# Patient Record
Sex: FEMALE | Race: WHITE | NOT HISPANIC OR LATINO
[De-identification: names, ages, dates, MRNs, and addresses within clinical notes are randomized per-mention and may not be internally consistent; named-entity substitution may affect disease eponyms.]

---

## 2017-01-06 ENCOUNTER — RESULT REVIEW (OUTPATIENT)
Age: 52
End: 2017-01-06

## 2017-01-06 ENCOUNTER — APPOINTMENT (OUTPATIENT)
Dept: INTERNAL MEDICINE | Facility: CLINIC | Age: 52
End: 2017-01-06

## 2017-01-06 VITALS
DIASTOLIC BLOOD PRESSURE: 80 MMHG | HEART RATE: 86 BPM | HEIGHT: 63 IN | WEIGHT: 143 LBS | BODY MASS INDEX: 25.34 KG/M2 | SYSTOLIC BLOOD PRESSURE: 120 MMHG

## 2017-01-18 LAB
T4 FREE SERPL-MCNC: 0.9 NG/DL
TSH SERPL DL<=0.005 MIU/L-ACNC: 1.81 UIU/ML

## 2017-01-24 ENCOUNTER — APPOINTMENT (OUTPATIENT)
Dept: INTERNAL MEDICINE | Facility: CLINIC | Age: 52
End: 2017-01-24

## 2017-02-07 ENCOUNTER — APPOINTMENT (OUTPATIENT)
Dept: NEUROLOGY | Facility: CLINIC | Age: 52
End: 2017-02-07

## 2017-02-07 ENCOUNTER — OUTPATIENT (OUTPATIENT)
Dept: OUTPATIENT SERVICES | Facility: HOSPITAL | Age: 52
LOS: 1 days | Discharge: HOME | End: 2017-02-07

## 2017-02-07 VITALS — SYSTOLIC BLOOD PRESSURE: 103 MMHG | DIASTOLIC BLOOD PRESSURE: 74 MMHG | HEART RATE: 88 BPM

## 2017-02-17 ENCOUNTER — RX RENEWAL (OUTPATIENT)
Age: 52
End: 2017-02-17

## 2017-03-15 ENCOUNTER — APPOINTMENT (OUTPATIENT)
Dept: HEMATOLOGY ONCOLOGY | Facility: CLINIC | Age: 52
End: 2017-03-15

## 2017-03-23 ENCOUNTER — APPOINTMENT (OUTPATIENT)
Dept: HEMATOLOGY ONCOLOGY | Facility: CLINIC | Age: 52
End: 2017-03-23

## 2017-03-24 LAB
BASOPHILS # BLD: 0.01 TH/MM3
BASOPHILS NFR BLD: 0.2 %
EOSINOPHIL # BLD: 0.05 TH/MM3
EOSINOPHIL NFR BLD: 1 %
ERYTHROCYTE [DISTWIDTH] IN BLOOD BY AUTOMATED COUNT: 16 %
FERRITIN SERPL-MCNC: 114 NG/ML
GRANULOCYTES # BLD: 1.63 TH/MM3
GRANULOCYTES NFR BLD: 31.8 %
HCT VFR BLD AUTO: 36.6 %
HGB BLD-MCNC: 12.1 G/DL
IMM GRANULOCYTES # BLD: 0.01 TH/MM3
IMM GRANULOCYTES NFR BLD: 0.2 %
IRON SERPL-MCNC: 119 UG/DL
LYMPHOCYTES # BLD: 2.77 TH/MM3
LYMPHOCYTES NFR BLD: 54.1 %
MCH RBC QN AUTO: 31.2 PG
MCHC RBC AUTO-ENTMCNC: 33.1 G/DL
MCV RBC AUTO: 94.3 FL
MONOCYTES # BLD: 0.65 TH/MM3
MONOCYTES NFR BLD: 12.7 %
PLATELET # BLD: 268 TH/MM3
PMV BLD AUTO: 9.5 FL
RBC # BLD AUTO: 3.88 MIL/MM3
WBC # BLD: 5.12 TH/MM3

## 2017-05-09 ENCOUNTER — APPOINTMENT (OUTPATIENT)
Dept: NEUROLOGY | Facility: CLINIC | Age: 52
End: 2017-05-09

## 2017-05-09 VITALS — SYSTOLIC BLOOD PRESSURE: 112 MMHG | HEART RATE: 88 BPM | DIASTOLIC BLOOD PRESSURE: 75 MMHG

## 2017-05-19 ENCOUNTER — OTHER (OUTPATIENT)
Age: 52
End: 2017-05-19

## 2017-05-19 ENCOUNTER — RX RENEWAL (OUTPATIENT)
Age: 52
End: 2017-05-19

## 2017-06-15 ENCOUNTER — APPOINTMENT (OUTPATIENT)
Dept: OBGYN | Facility: CLINIC | Age: 52
End: 2017-06-15

## 2017-06-15 ENCOUNTER — OUTPATIENT (OUTPATIENT)
Dept: OUTPATIENT SERVICES | Facility: HOSPITAL | Age: 52
LOS: 1 days | Discharge: HOME | End: 2017-06-15

## 2017-06-15 VITALS
WEIGHT: 143 LBS | BODY MASS INDEX: 25.34 KG/M2 | SYSTOLIC BLOOD PRESSURE: 110 MMHG | DIASTOLIC BLOOD PRESSURE: 70 MMHG | HEIGHT: 63 IN

## 2017-06-15 DIAGNOSIS — G40.409 OTHER GENERALIZED EPILEPSY AND EPILEPTIC SYNDROMES, NOT INTRACTABLE, WITHOUT STATUS EPILEPTICUS: ICD-10-CM

## 2017-06-15 DIAGNOSIS — G40.909 EPILEPSY, UNSPECIFIED, NOT INTRACTABLE, WITHOUT STATUS EPILEPTICUS: ICD-10-CM

## 2017-06-23 ENCOUNTER — APPOINTMENT (OUTPATIENT)
Dept: HEMATOLOGY ONCOLOGY | Facility: CLINIC | Age: 52
End: 2017-06-23

## 2017-06-23 ENCOUNTER — OUTPATIENT (OUTPATIENT)
Dept: OUTPATIENT SERVICES | Facility: HOSPITAL | Age: 52
LOS: 1 days | Discharge: HOME | End: 2017-06-23

## 2017-06-23 VITALS
HEART RATE: 89 BPM | WEIGHT: 143 LBS | DIASTOLIC BLOOD PRESSURE: 55 MMHG | TEMPERATURE: 96.9 F | HEIGHT: 63 IN | RESPIRATION RATE: 14 BRPM | BODY MASS INDEX: 25.34 KG/M2 | SYSTOLIC BLOOD PRESSURE: 102 MMHG

## 2017-06-23 DIAGNOSIS — G40.909 EPILEPSY, UNSPECIFIED, NOT INTRACTABLE, WITHOUT STATUS EPILEPTICUS: ICD-10-CM

## 2017-06-23 DIAGNOSIS — N93.9 ABNORMAL UTERINE AND VAGINAL BLEEDING, UNSPECIFIED: ICD-10-CM

## 2017-06-23 DIAGNOSIS — G40.409 OTHER GENERALIZED EPILEPSY AND EPILEPTIC SYNDROMES, NOT INTRACTABLE, WITHOUT STATUS EPILEPTICUS: ICD-10-CM

## 2017-06-28 ENCOUNTER — RESULT REVIEW (OUTPATIENT)
Age: 52
End: 2017-06-28

## 2017-06-28 DIAGNOSIS — D50.9 IRON DEFICIENCY ANEMIA, UNSPECIFIED: ICD-10-CM

## 2017-06-28 LAB
BASOPHILS # BLD: 0.02 TH/MM3
BASOPHILS NFR BLD: 0.3 %
EOSINOPHIL # BLD: 0.05 TH/MM3
EOSINOPHIL NFR BLD: 0.8 %
ERYTHROCYTE [DISTWIDTH] IN BLOOD BY AUTOMATED COUNT: 13.6 %
GRANULOCYTES # BLD: 2 TH/MM3
GRANULOCYTES NFR BLD: 33.8 %
HCT VFR BLD AUTO: 36.3 %
HGB BLD-MCNC: 12.1 G/DL
IMM GRANULOCYTES # BLD: 0.03 TH/MM3
IMM GRANULOCYTES NFR BLD: 0.5 %
LYMPHOCYTES # BLD: 3.03 TH/MM3
LYMPHOCYTES NFR BLD: 51.1 %
MCH RBC QN AUTO: 30.9 PG
MCHC RBC AUTO-ENTMCNC: 33.3 G/DL
MCV RBC AUTO: 92.6 FL
MONOCYTES # BLD: 0.8 TH/MM3
MONOCYTES NFR BLD: 13.5 %
PLATELET # BLD: 203 TH/MM3
PMV BLD AUTO: 10 FL
RBC # BLD AUTO: 3.92 MIL/MM3
WBC # BLD: 5.93 TH/MM3

## 2017-07-12 ENCOUNTER — OUTPATIENT (OUTPATIENT)
Dept: OUTPATIENT SERVICES | Facility: HOSPITAL | Age: 52
LOS: 1 days | Discharge: HOME | End: 2017-07-12

## 2017-07-12 ENCOUNTER — APPOINTMENT (OUTPATIENT)
Dept: INTERNAL MEDICINE | Facility: CLINIC | Age: 52
End: 2017-07-12

## 2017-07-12 VITALS
HEART RATE: 93 BPM | HEIGHT: 63 IN | BODY MASS INDEX: 24.98 KG/M2 | DIASTOLIC BLOOD PRESSURE: 71 MMHG | WEIGHT: 141 LBS | SYSTOLIC BLOOD PRESSURE: 110 MMHG

## 2017-07-12 DIAGNOSIS — G40.409 OTHER GENERALIZED EPILEPSY AND EPILEPTIC SYNDROMES, NOT INTRACTABLE, WITHOUT STATUS EPILEPTICUS: ICD-10-CM

## 2017-07-12 DIAGNOSIS — N93.9 ABNORMAL UTERINE AND VAGINAL BLEEDING, UNSPECIFIED: ICD-10-CM

## 2017-07-12 DIAGNOSIS — G40.909 EPILEPSY, UNSPECIFIED, NOT INTRACTABLE, WITHOUT STATUS EPILEPTICUS: ICD-10-CM

## 2017-07-13 DIAGNOSIS — R56.9 UNSPECIFIED CONVULSIONS: ICD-10-CM

## 2017-07-13 DIAGNOSIS — J45.998 OTHER ASTHMA: ICD-10-CM

## 2017-07-13 DIAGNOSIS — F33.9 MAJOR DEPRESSIVE DISORDER, RECURRENT, UNSPECIFIED: ICD-10-CM

## 2017-07-20 LAB
ESTIMATED AVERGAGE GLUCOSE (NORTH): 120 MG/DL
HBA1C MFR BLD: 5.8 %

## 2017-08-02 ENCOUNTER — OUTPATIENT (OUTPATIENT)
Dept: OUTPATIENT SERVICES | Facility: HOSPITAL | Age: 52
LOS: 1 days | Discharge: HOME | End: 2017-08-02

## 2017-08-02 DIAGNOSIS — N93.9 ABNORMAL UTERINE AND VAGINAL BLEEDING, UNSPECIFIED: ICD-10-CM

## 2017-08-02 DIAGNOSIS — Z12.31 ENCOUNTER FOR SCREENING MAMMOGRAM FOR MALIGNANT NEOPLASM OF BREAST: ICD-10-CM

## 2017-08-02 DIAGNOSIS — G40.909 EPILEPSY, UNSPECIFIED, NOT INTRACTABLE, WITHOUT STATUS EPILEPTICUS: ICD-10-CM

## 2017-08-02 DIAGNOSIS — G40.409 OTHER GENERALIZED EPILEPSY AND EPILEPTIC SYNDROMES, NOT INTRACTABLE, WITHOUT STATUS EPILEPTICUS: ICD-10-CM

## 2017-08-15 ENCOUNTER — RX RENEWAL (OUTPATIENT)
Age: 52
End: 2017-08-15

## 2017-08-15 ENCOUNTER — APPOINTMENT (OUTPATIENT)
Dept: NEUROLOGY | Facility: CLINIC | Age: 52
End: 2017-08-15

## 2017-08-15 ENCOUNTER — OUTPATIENT (OUTPATIENT)
Dept: OUTPATIENT SERVICES | Facility: HOSPITAL | Age: 52
LOS: 1 days | Discharge: HOME | End: 2017-08-15

## 2017-08-15 VITALS
SYSTOLIC BLOOD PRESSURE: 105 MMHG | BODY MASS INDEX: 24.8 KG/M2 | WEIGHT: 140 LBS | HEART RATE: 99 BPM | DIASTOLIC BLOOD PRESSURE: 72 MMHG | HEIGHT: 63 IN

## 2017-08-15 DIAGNOSIS — N93.9 ABNORMAL UTERINE AND VAGINAL BLEEDING, UNSPECIFIED: ICD-10-CM

## 2017-08-15 DIAGNOSIS — G40.409 OTHER GENERALIZED EPILEPSY AND EPILEPTIC SYNDROMES, NOT INTRACTABLE, WITHOUT STATUS EPILEPTICUS: ICD-10-CM

## 2017-08-15 DIAGNOSIS — G40.909 EPILEPSY, UNSPECIFIED, NOT INTRACTABLE, WITHOUT STATUS EPILEPTICUS: ICD-10-CM

## 2017-08-22 ENCOUNTER — INPATIENT (INPATIENT)
Facility: HOSPITAL | Age: 52
LOS: 2 days | Discharge: HOME | End: 2017-08-25
Attending: INTERNAL MEDICINE | Admitting: INTERNAL MEDICINE

## 2017-08-22 DIAGNOSIS — N93.9 ABNORMAL UTERINE AND VAGINAL BLEEDING, UNSPECIFIED: ICD-10-CM

## 2017-08-22 DIAGNOSIS — G40.909 EPILEPSY, UNSPECIFIED, NOT INTRACTABLE, WITHOUT STATUS EPILEPTICUS: ICD-10-CM

## 2017-08-22 DIAGNOSIS — G40.409 OTHER GENERALIZED EPILEPSY AND EPILEPTIC SYNDROMES, NOT INTRACTABLE, WITHOUT STATUS EPILEPTICUS: ICD-10-CM

## 2017-08-28 DIAGNOSIS — G40.919 EPILEPSY, UNSPECIFIED, INTRACTABLE, WITHOUT STATUS EPILEPTICUS: ICD-10-CM

## 2017-08-29 DIAGNOSIS — J44.9 CHRONIC OBSTRUCTIVE PULMONARY DISEASE, UNSPECIFIED: ICD-10-CM

## 2017-08-29 DIAGNOSIS — G40.909 EPILEPSY, UNSPECIFIED, NOT INTRACTABLE, WITHOUT STATUS EPILEPTICUS: ICD-10-CM

## 2017-08-29 DIAGNOSIS — K59.00 CONSTIPATION, UNSPECIFIED: ICD-10-CM

## 2017-08-29 DIAGNOSIS — G40.804 OTHER EPILEPSY, INTRACTABLE, WITHOUT STATUS EPILEPTICUS: ICD-10-CM

## 2017-08-29 DIAGNOSIS — Z86.011 PERSONAL HISTORY OF BENIGN NEOPLASM OF THE BRAIN: ICD-10-CM

## 2017-09-06 ENCOUNTER — OUTPATIENT (OUTPATIENT)
Dept: OUTPATIENT SERVICES | Facility: HOSPITAL | Age: 52
LOS: 1 days | Discharge: HOME | End: 2017-09-06

## 2017-09-06 ENCOUNTER — RESULT REVIEW (OUTPATIENT)
Age: 52
End: 2017-09-06

## 2017-09-06 DIAGNOSIS — R56.9 UNSPECIFIED CONVULSIONS: ICD-10-CM

## 2017-09-06 DIAGNOSIS — N93.9 ABNORMAL UTERINE AND VAGINAL BLEEDING, UNSPECIFIED: ICD-10-CM

## 2017-09-06 DIAGNOSIS — Z00.00 ENCOUNTER FOR GENERAL ADULT MEDICAL EXAMINATION WITHOUT ABNORMAL FINDINGS: ICD-10-CM

## 2017-09-06 DIAGNOSIS — G40.409 OTHER GENERALIZED EPILEPSY AND EPILEPTIC SYNDROMES, NOT INTRACTABLE, WITHOUT STATUS EPILEPTICUS: ICD-10-CM

## 2017-09-06 DIAGNOSIS — G40.909 EPILEPSY, UNSPECIFIED, NOT INTRACTABLE, WITHOUT STATUS EPILEPTICUS: ICD-10-CM

## 2017-09-06 LAB
10OH-CARBAZEPINE SERPL-MCNC: 16.9 MCG/ML
10OH-CARBAZEPINE SERPL-MCNC: 20.2 MCG/ML
LEVETIRACETAM SERPL-MCNC: 55.6 MCG/ML
LEVETIRACETAM SERPL-MCNC: 76.1 MCG/ML

## 2017-09-12 ENCOUNTER — OUTPATIENT (OUTPATIENT)
Dept: OUTPATIENT SERVICES | Facility: HOSPITAL | Age: 52
LOS: 1 days | Discharge: HOME | End: 2017-09-12

## 2017-09-12 ENCOUNTER — APPOINTMENT (OUTPATIENT)
Dept: NEUROLOGY | Facility: CLINIC | Age: 52
End: 2017-09-12

## 2017-09-12 VITALS
DIASTOLIC BLOOD PRESSURE: 69 MMHG | BODY MASS INDEX: 24.8 KG/M2 | SYSTOLIC BLOOD PRESSURE: 110 MMHG | HEART RATE: 95 BPM | WEIGHT: 140 LBS | HEIGHT: 63 IN

## 2017-09-12 DIAGNOSIS — G40.909 EPILEPSY, UNSPECIFIED, NOT INTRACTABLE, WITHOUT STATUS EPILEPTICUS: ICD-10-CM

## 2017-09-12 DIAGNOSIS — N93.9 ABNORMAL UTERINE AND VAGINAL BLEEDING, UNSPECIFIED: ICD-10-CM

## 2017-09-12 DIAGNOSIS — G40.409 OTHER GENERALIZED EPILEPSY AND EPILEPTIC SYNDROMES, NOT INTRACTABLE, WITHOUT STATUS EPILEPTICUS: ICD-10-CM

## 2017-11-09 ENCOUNTER — APPOINTMENT (OUTPATIENT)
Dept: OBGYN | Facility: CLINIC | Age: 52
End: 2017-11-09

## 2017-11-09 ENCOUNTER — OUTPATIENT (OUTPATIENT)
Dept: OUTPATIENT SERVICES | Facility: HOSPITAL | Age: 52
LOS: 1 days | Discharge: HOME | End: 2017-11-09

## 2017-11-09 VITALS — DIASTOLIC BLOOD PRESSURE: 64 MMHG | WEIGHT: 141 LBS | SYSTOLIC BLOOD PRESSURE: 100 MMHG | BODY MASS INDEX: 24.98 KG/M2

## 2017-11-09 DIAGNOSIS — G40.909 EPILEPSY, UNSPECIFIED, NOT INTRACTABLE, WITHOUT STATUS EPILEPTICUS: ICD-10-CM

## 2017-11-09 DIAGNOSIS — G40.409 OTHER GENERALIZED EPILEPSY AND EPILEPTIC SYNDROMES, NOT INTRACTABLE, WITHOUT STATUS EPILEPTICUS: ICD-10-CM

## 2017-11-09 DIAGNOSIS — N93.9 ABNORMAL UTERINE AND VAGINAL BLEEDING, UNSPECIFIED: ICD-10-CM

## 2017-11-14 ENCOUNTER — OUTPATIENT (OUTPATIENT)
Dept: OUTPATIENT SERVICES | Facility: HOSPITAL | Age: 52
LOS: 1 days | Discharge: HOME | End: 2017-11-14

## 2017-11-14 ENCOUNTER — APPOINTMENT (OUTPATIENT)
Dept: NEUROLOGY | Facility: CLINIC | Age: 52
End: 2017-11-14

## 2017-11-14 VITALS
HEIGHT: 63 IN | SYSTOLIC BLOOD PRESSURE: 110 MMHG | WEIGHT: 132 LBS | DIASTOLIC BLOOD PRESSURE: 72 MMHG | HEART RATE: 87 BPM | BODY MASS INDEX: 23.39 KG/M2

## 2017-11-14 DIAGNOSIS — G40.909 EPILEPSY, UNSPECIFIED, NOT INTRACTABLE, WITHOUT STATUS EPILEPTICUS: ICD-10-CM

## 2017-11-14 DIAGNOSIS — N93.9 ABNORMAL UTERINE AND VAGINAL BLEEDING, UNSPECIFIED: ICD-10-CM

## 2017-11-14 DIAGNOSIS — G40.409 OTHER GENERALIZED EPILEPSY AND EPILEPTIC SYNDROMES, NOT INTRACTABLE, WITHOUT STATUS EPILEPTICUS: ICD-10-CM

## 2017-12-15 ENCOUNTER — OUTPATIENT (OUTPATIENT)
Dept: OUTPATIENT SERVICES | Facility: HOSPITAL | Age: 52
LOS: 1 days | Discharge: HOME | End: 2017-12-15

## 2017-12-15 ENCOUNTER — APPOINTMENT (OUTPATIENT)
Dept: HEMATOLOGY ONCOLOGY | Facility: CLINIC | Age: 52
End: 2017-12-15

## 2017-12-15 VITALS
BODY MASS INDEX: 25.69 KG/M2 | SYSTOLIC BLOOD PRESSURE: 117 MMHG | HEIGHT: 63 IN | DIASTOLIC BLOOD PRESSURE: 73 MMHG | TEMPERATURE: 97.7 F | HEART RATE: 80 BPM | WEIGHT: 145 LBS

## 2017-12-31 LAB
BASOPHILS # BLD: 0.01 TH/MM3
BASOPHILS NFR BLD: 0.2 %
EOSINOPHIL # BLD: 0.08 TH/MM3
EOSINOPHIL NFR BLD: 1.4 %
ERYTHROCYTE [DISTWIDTH] IN BLOOD BY AUTOMATED COUNT: 14.4 %
FERRITIN SERPL-MCNC: 102 NG/ML
GRANULOCYTES # BLD: 1.73 TH/MM3
GRANULOCYTES NFR BLD: 31.1 %
HCT VFR BLD AUTO: 35.9 %
HGB BLD-MCNC: 11.6 G/DL
IMM GRANULOCYTES # BLD: 0.03 TH/MM3
IMM GRANULOCYTES NFR BLD: 0.5 %
IRON SERPL-MCNC: 96 UG/DL
LYMPHOCYTES # BLD: 2.83 TH/MM3
LYMPHOCYTES NFR BLD: 50.8 %
MCH RBC QN AUTO: 30.9 PG
MCHC RBC AUTO-ENTMCNC: 32.3 G/DL
MCV RBC AUTO: 95.7 FL
MONOCYTES # BLD: 0.89 TH/MM3
MONOCYTES NFR BLD: 16 %
PLATELET # BLD: 208 TH/MM3
PMV BLD AUTO: 9.9 FL
RBC # BLD AUTO: 3.75 MIL/MM3
TIBC SERPL-MCNC: 349 UG/DL
WBC # BLD: 5.57 TH/MM3

## 2018-01-08 DIAGNOSIS — D50.9 IRON DEFICIENCY ANEMIA, UNSPECIFIED: ICD-10-CM

## 2018-01-10 ENCOUNTER — OUTPATIENT (OUTPATIENT)
Dept: OUTPATIENT SERVICES | Facility: HOSPITAL | Age: 53
LOS: 1 days | Discharge: HOME | End: 2018-01-10

## 2018-01-10 ENCOUNTER — APPOINTMENT (OUTPATIENT)
Dept: INTERNAL MEDICINE | Facility: CLINIC | Age: 53
End: 2018-01-10

## 2018-01-10 VITALS
BODY MASS INDEX: 24.63 KG/M2 | WEIGHT: 139 LBS | HEART RATE: 90 BPM | DIASTOLIC BLOOD PRESSURE: 67 MMHG | HEIGHT: 63 IN | SYSTOLIC BLOOD PRESSURE: 102 MMHG

## 2018-01-10 DIAGNOSIS — D64.9 ANEMIA, UNSPECIFIED: ICD-10-CM

## 2018-01-10 DIAGNOSIS — G40.909 EPILEPSY, UNSPECIFIED, NOT INTRACTABLE, WITHOUT STATUS EPILEPTICUS: ICD-10-CM

## 2018-01-10 DIAGNOSIS — N93.9 ABNORMAL UTERINE AND VAGINAL BLEEDING, UNSPECIFIED: ICD-10-CM

## 2018-01-10 DIAGNOSIS — Z82.49 FAMILY HISTORY OF ISCHEMIC HEART DISEASE AND OTHER DISEASES OF THE CIRCULATORY SYSTEM: ICD-10-CM

## 2018-01-10 DIAGNOSIS — G40.409 OTHER GENERALIZED EPILEPSY AND EPILEPTIC SYNDROMES, NOT INTRACTABLE, WITHOUT STATUS EPILEPTICUS: ICD-10-CM

## 2018-01-10 DIAGNOSIS — R56.9 UNSPECIFIED CONVULSIONS: ICD-10-CM

## 2018-01-10 DIAGNOSIS — J45.998 OTHER ASTHMA: ICD-10-CM

## 2018-01-10 RX ORDER — B-COMPLEX WITH VITAMIN C
500-200 TABLET ORAL
Qty: 60 | Refills: 0 | Status: COMPLETED | COMMUNITY
Start: 2017-07-06

## 2018-02-19 DIAGNOSIS — G40.919 EPILEPSY, UNSPECIFIED, INTRACTABLE, WITHOUT STATUS EPILEPTICUS: ICD-10-CM

## 2018-03-16 ENCOUNTER — OUTPATIENT (OUTPATIENT)
Dept: OUTPATIENT SERVICES | Facility: HOSPITAL | Age: 53
LOS: 1 days | Discharge: HOME | End: 2018-03-16

## 2018-03-16 ENCOUNTER — LABORATORY RESULT (OUTPATIENT)
Age: 53
End: 2018-03-16

## 2018-03-16 ENCOUNTER — APPOINTMENT (OUTPATIENT)
Dept: HEMATOLOGY ONCOLOGY | Facility: CLINIC | Age: 53
End: 2018-03-16

## 2018-03-16 VITALS
HEIGHT: 63 IN | HEART RATE: 85 BPM | SYSTOLIC BLOOD PRESSURE: 102 MMHG | RESPIRATION RATE: 14 BRPM | DIASTOLIC BLOOD PRESSURE: 67 MMHG | TEMPERATURE: 96.9 F | WEIGHT: 139 LBS | BODY MASS INDEX: 24.63 KG/M2

## 2018-03-20 DIAGNOSIS — D50.9 IRON DEFICIENCY ANEMIA, UNSPECIFIED: ICD-10-CM

## 2018-04-17 ENCOUNTER — APPOINTMENT (OUTPATIENT)
Dept: NEUROLOGY | Facility: CLINIC | Age: 53
End: 2018-04-17

## 2018-04-17 ENCOUNTER — OUTPATIENT (OUTPATIENT)
Dept: OUTPATIENT SERVICES | Facility: HOSPITAL | Age: 53
LOS: 1 days | Discharge: HOME | End: 2018-04-17

## 2018-04-17 VITALS
HEIGHT: 63 IN | DIASTOLIC BLOOD PRESSURE: 72 MMHG | HEART RATE: 96 BPM | BODY MASS INDEX: 25.16 KG/M2 | WEIGHT: 142 LBS | SYSTOLIC BLOOD PRESSURE: 107 MMHG

## 2018-05-18 LAB
ALBUMIN SERPL ELPH-MCNC: 4.5 G/DL
ALP BLD-CCNC: 73 U/L
ALT SERPL-CCNC: 26 U/L
ANION GAP SERPL CALC-SCNC: 20 MMOL/L
AST SERPL-CCNC: 16 U/L
BILIRUB SERPL-MCNC: 0.2 MG/DL
BUN SERPL-MCNC: 24 MG/DL
CALCIUM SERPL-MCNC: 10.4 MG/DL
CHLORIDE SERPL-SCNC: 100 MMOL/L
CO2 SERPL-SCNC: 24 MMOL/L
CREAT SERPL-MCNC: 0.6 MG/DL
FERRITIN SERPL-MCNC: 154 NG/ML
GLUCOSE SERPL-MCNC: 147 MG/DL
HCT VFR BLD CALC: 38.3 %
HGB BLD-MCNC: 12.5 G/DL
IRON SATN MFR SERPL: 33 %
IRON SERPL-MCNC: 121 UG/DL
MCHC RBC-ENTMCNC: 31.3 PG
MCHC RBC-ENTMCNC: 32.6 G/DL
MCV RBC AUTO: 95.8 FL
PLATELET # BLD AUTO: 173 K/UL
PMV BLD: 9.9 FL
POTASSIUM SERPL-SCNC: 4.4 MMOL/L
PROT SERPL-MCNC: 7.7 G/DL
RBC # BLD: 4 M/UL
RBC # FLD: 14.8 %
SODIUM SERPL-SCNC: 144 MMOL/L
TIBC SERPL-MCNC: 364 UG/DL
UIBC SERPL-MCNC: 243 UG/DL
WBC # FLD AUTO: 5.52 K/UL

## 2018-06-15 ENCOUNTER — APPOINTMENT (OUTPATIENT)
Dept: HEMATOLOGY ONCOLOGY | Facility: CLINIC | Age: 53
End: 2018-06-15

## 2018-07-03 ENCOUNTER — APPOINTMENT (OUTPATIENT)
Dept: INTERNAL MEDICINE | Facility: CLINIC | Age: 53
End: 2018-07-03

## 2018-07-03 ENCOUNTER — OUTPATIENT (OUTPATIENT)
Dept: OUTPATIENT SERVICES | Facility: HOSPITAL | Age: 53
LOS: 1 days | Discharge: HOME | End: 2018-07-03

## 2018-07-03 VITALS
DIASTOLIC BLOOD PRESSURE: 70 MMHG | BODY MASS INDEX: 25.34 KG/M2 | HEART RATE: 93 BPM | SYSTOLIC BLOOD PRESSURE: 110 MMHG | WEIGHT: 143 LBS | HEIGHT: 63 IN

## 2018-07-03 NOTE — PLAN
[FreeTextEntry1] : 52 F w/ EVERETTE, seizures, asthma, and depression here for f/u and refills\par \par # EVERETTE\par - Refill B6 & Folic acid\par - Has appt with heme/onc this week\par \par # Seizures\par - F/u w/ Neuro\par \par # Asthma\par - c/w Ventolin inhaler as needed\par \par # HCM\par - Obtain blood work\par - Mammogram negative\par - F/u w/ gynecology

## 2018-07-03 NOTE — HISTORY OF PRESENT ILLNESS
[FreeTextEntry1] : 51 yo F w/ iron deficiency anemia, seizures, asthma, and depression presents for f/u and medication refills\par \par  [de-identified] : 53 yo F here for follow up. Pt states that she has been feeling more tired recently, but has been noncompliant with her iron ills. Pt is requesting refills on her medications.\par \par Pt has Iron deficiency anemia, currently followed heme/onc (Dr. Cruz).\par Pt has a hx of seizures, followed by neuro, last seizure was Nov 15th, 2017.\par Pt has asthma, no recent attacks

## 2018-07-03 NOTE — PHYSICAL EXAM

## 2018-07-05 ENCOUNTER — OUTPATIENT (OUTPATIENT)
Dept: OUTPATIENT SERVICES | Facility: HOSPITAL | Age: 53
LOS: 1 days | Discharge: HOME | End: 2018-07-05

## 2018-07-05 ENCOUNTER — APPOINTMENT (OUTPATIENT)
Dept: HEMATOLOGY ONCOLOGY | Facility: CLINIC | Age: 53
End: 2018-07-05

## 2018-07-05 ENCOUNTER — LABORATORY RESULT (OUTPATIENT)
Age: 53
End: 2018-07-05

## 2018-07-05 VITALS
SYSTOLIC BLOOD PRESSURE: 109 MMHG | WEIGHT: 139 LBS | RESPIRATION RATE: 16 BRPM | DIASTOLIC BLOOD PRESSURE: 61 MMHG | HEART RATE: 83 BPM | BODY MASS INDEX: 24.63 KG/M2 | HEIGHT: 63 IN

## 2018-07-05 DIAGNOSIS — D50.9 IRON DEFICIENCY ANEMIA, UNSPECIFIED: ICD-10-CM

## 2018-07-05 DIAGNOSIS — R56.9 UNSPECIFIED CONVULSIONS: ICD-10-CM

## 2018-07-05 DIAGNOSIS — J45.998 OTHER ASTHMA: ICD-10-CM

## 2018-07-06 DIAGNOSIS — D50.9 IRON DEFICIENCY ANEMIA, UNSPECIFIED: ICD-10-CM

## 2018-08-11 LAB
FERRITIN SERPL-MCNC: 169 NG/ML
HCT VFR BLD CALC: 35.9 %
HGB BLD-MCNC: 11.9 G/DL
IRON SATN MFR SERPL: 30 %
IRON SERPL-MCNC: 94 UG/DL
MCHC RBC-ENTMCNC: 31.1 PG
MCHC RBC-ENTMCNC: 33.1 G/DL
MCV RBC AUTO: 93.7 FL
PLATELET # BLD AUTO: 198 K/UL
PMV BLD: 9.8 FL
RBC # BLD: 3.83 M/UL
RBC # FLD: 13.3 %
TIBC SERPL-MCNC: 309 UG/DL
UIBC SERPL-MCNC: 215 UG/DL
WBC # FLD AUTO: 6.11 K/UL

## 2018-09-06 ENCOUNTER — RX RENEWAL (OUTPATIENT)
Age: 53
End: 2018-09-06

## 2018-09-18 LAB — VALPROATE SERPL-MCNC: 103 UG/ML

## 2018-10-16 ENCOUNTER — OUTPATIENT (OUTPATIENT)
Dept: OUTPATIENT SERVICES | Facility: HOSPITAL | Age: 53
LOS: 1 days | Discharge: HOME | End: 2018-10-16

## 2018-10-16 ENCOUNTER — APPOINTMENT (OUTPATIENT)
Dept: NEUROLOGY | Facility: CLINIC | Age: 53
End: 2018-10-16

## 2018-10-16 VITALS — WEIGHT: 140 LBS | HEIGHT: 63 IN | BODY MASS INDEX: 24.8 KG/M2

## 2018-10-16 VITALS — HEART RATE: 96 BPM | DIASTOLIC BLOOD PRESSURE: 84 MMHG | SYSTOLIC BLOOD PRESSURE: 126 MMHG

## 2018-10-24 ENCOUNTER — APPOINTMENT (OUTPATIENT)
Dept: INTERNAL MEDICINE | Facility: CLINIC | Age: 53
End: 2018-10-24

## 2018-10-24 ENCOUNTER — LABORATORY RESULT (OUTPATIENT)
Age: 53
End: 2018-10-24

## 2018-10-24 ENCOUNTER — OUTPATIENT (OUTPATIENT)
Dept: OUTPATIENT SERVICES | Facility: HOSPITAL | Age: 53
LOS: 1 days | Discharge: HOME | End: 2018-10-24

## 2018-10-24 VITALS
TEMPERATURE: 97.1 F | HEIGHT: 63 IN | DIASTOLIC BLOOD PRESSURE: 72 MMHG | BODY MASS INDEX: 25.34 KG/M2 | HEART RATE: 93 BPM | WEIGHT: 143 LBS | SYSTOLIC BLOOD PRESSURE: 105 MMHG

## 2018-10-24 DIAGNOSIS — G40.909 EPILEPSY, UNSPECIFIED, NOT INTRACTABLE, WITHOUT STATUS EPILEPTICUS: ICD-10-CM

## 2018-10-24 NOTE — PLAN
[FreeTextEntry1] : 53 year old F with EVERETTE, seizures, asthma, and depression here for physical therapy forms and a flu shot\par \par # EVERETTE\par - improving, follows with Dr. Cruz\par \par # Seizures\par - Contine Depakote at night, had another episode of Seizure yesterday. Please follow up with Neurologist \par - Repeat Depakote and Oxycarbazapine levels within a week \par \par \par #Loss of Balance\par - Will need physical therapy,  is going to bring forms\par \par # Asthma\par - c/w Ventolin inhaler as needed\par \par # HCM\par - Will repeat blood work before next visit\par - last Mammogram negative, Mammogram to be repeated before next visit\par - F/u with gynecology \par - She is refusing colonoscopy, last colonoscopy 3 years ago as per patient

## 2018-10-24 NOTE — HISTORY OF PRESENT ILLNESS
[FreeTextEntry1] : follow up for physical therapy and flu shot [de-identified] : 53 year old F with EVERETTE, seizures, asthma, and depression here for physical therapy forms and a flu shot. She has been following with Neurology for seizures and loss of balance. She needs physical therapy at home

## 2018-10-25 LAB
ALBUMIN SERPL ELPH-MCNC: 4.4 G/DL
ALP BLD-CCNC: 94 U/L
ALT SERPL-CCNC: 19 U/L
ANION GAP SERPL CALC-SCNC: 14 MMOL/L
AST SERPL-CCNC: 15 U/L
BASOPHILS # BLD AUTO: 0.03 K/UL
BASOPHILS NFR BLD AUTO: 0.4 %
BILIRUB SERPL-MCNC: <0.2 MG/DL
BUN SERPL-MCNC: 17 MG/DL
CALCIUM SERPL-MCNC: 10 MG/DL
CARBAMAZEPINE SERPL-MCNC: <2 UG/ML
CHLORIDE SERPL-SCNC: 100 MMOL/L
CHOLEST SERPL-MCNC: 218 MG/DL
CHOLEST/HDLC SERPL: 2.9 RATIO
CO2 SERPL-SCNC: 27 MMOL/L
CREAT SERPL-MCNC: 0.5 MG/DL
EOSINOPHIL # BLD AUTO: 0.13 K/UL
EOSINOPHIL NFR BLD AUTO: 1.9 %
ESTIMATED AVERAGE GLUCOSE: 123 MG/DL
GLUCOSE SERPL-MCNC: 108 MG/DL
HBA1C MFR BLD HPLC: 5.9 %
HCT VFR BLD CALC: 36.6 %
HDLC SERPL-MCNC: 74 MG/DL
HGB BLD-MCNC: 11.8 G/DL
IMM GRANULOCYTES NFR BLD AUTO: 0.4 %
LDLC SERPL CALC-MCNC: 127 MG/DL
LYMPHOCYTES # BLD AUTO: 3.23 K/UL
LYMPHOCYTES NFR BLD AUTO: 46.9 %
MAGNESIUM SERPL-MCNC: 1.9 MG/DL
MAN DIFF?: NORMAL
MCHC RBC-ENTMCNC: 30.9 PG
MCHC RBC-ENTMCNC: 32.2 G/DL
MCV RBC AUTO: 95.8 FL
MONOCYTES # BLD AUTO: 0.95 K/UL
MONOCYTES NFR BLD AUTO: 13.8 %
NEUTROPHILS # BLD AUTO: 2.51 K/UL
NEUTROPHILS NFR BLD AUTO: 36.6 %
PLATELET # BLD AUTO: 231 K/UL
POTASSIUM SERPL-SCNC: 4.7 MMOL/L
PROT SERPL-MCNC: 7.4 G/DL
RBC # BLD: 3.82 M/UL
RBC # FLD: 14.1 %
SODIUM SERPL-SCNC: 141 MMOL/L
TRIGL SERPL-MCNC: 106 MG/DL
VALPROATE SERPL-MCNC: 80 UG/ML
WBC # FLD AUTO: 6.88 K/UL

## 2018-10-29 DIAGNOSIS — R26.89 OTHER ABNORMALITIES OF GAIT AND MOBILITY: ICD-10-CM

## 2018-10-29 DIAGNOSIS — Q27.30 ARTERIOVENOUS MALFORMATION, SITE UNSPECIFIED: ICD-10-CM

## 2018-10-29 DIAGNOSIS — R56.9 UNSPECIFIED CONVULSIONS: ICD-10-CM

## 2018-11-15 ENCOUNTER — APPOINTMENT (OUTPATIENT)
Dept: OBGYN | Facility: CLINIC | Age: 53
End: 2018-11-15

## 2018-11-15 ENCOUNTER — OUTPATIENT (OUTPATIENT)
Dept: OUTPATIENT SERVICES | Facility: HOSPITAL | Age: 53
LOS: 1 days | Discharge: HOME | End: 2018-11-15

## 2018-11-15 VITALS
HEIGHT: 63 IN | DIASTOLIC BLOOD PRESSURE: 62 MMHG | BODY MASS INDEX: 25.69 KG/M2 | WEIGHT: 145 LBS | SYSTOLIC BLOOD PRESSURE: 108 MMHG

## 2018-11-16 DIAGNOSIS — Z01.419 ENCOUNTER FOR GYNECOLOGICAL EXAMINATION (GENERAL) (ROUTINE) WITHOUT ABNORMAL FINDINGS: ICD-10-CM

## 2018-11-28 ENCOUNTER — OUTPATIENT (OUTPATIENT)
Dept: OUTPATIENT SERVICES | Facility: HOSPITAL | Age: 53
LOS: 1 days | Discharge: HOME | End: 2018-11-28

## 2018-11-28 DIAGNOSIS — Z12.31 ENCOUNTER FOR SCREENING MAMMOGRAM FOR MALIGNANT NEOPLASM OF BREAST: ICD-10-CM

## 2018-12-19 ENCOUNTER — APPOINTMENT (OUTPATIENT)
Dept: INTERNAL MEDICINE | Facility: CLINIC | Age: 53
End: 2018-12-19

## 2019-01-10 ENCOUNTER — APPOINTMENT (OUTPATIENT)
Dept: HEMATOLOGY ONCOLOGY | Facility: CLINIC | Age: 54
End: 2019-01-10

## 2019-01-10 ENCOUNTER — LABORATORY RESULT (OUTPATIENT)
Age: 54
End: 2019-01-10

## 2019-01-10 ENCOUNTER — OUTPATIENT (OUTPATIENT)
Dept: OUTPATIENT SERVICES | Facility: HOSPITAL | Age: 54
LOS: 1 days | Discharge: HOME | End: 2019-01-10

## 2019-01-10 VITALS
DIASTOLIC BLOOD PRESSURE: 70 MMHG | HEART RATE: 80 BPM | SYSTOLIC BLOOD PRESSURE: 102 MMHG | WEIGHT: 146 LBS | HEIGHT: 63 IN | BODY MASS INDEX: 25.87 KG/M2 | TEMPERATURE: 97.5 F | RESPIRATION RATE: 14 BRPM

## 2019-01-11 DIAGNOSIS — D50.9 IRON DEFICIENCY ANEMIA, UNSPECIFIED: ICD-10-CM

## 2019-01-12 LAB
FERRITIN SERPL-MCNC: 119 NG/ML
FOLATE SERPL-MCNC: >20 NG/ML
HCT VFR BLD CALC: 35.8 %
HGB BLD-MCNC: 11.8 G/DL
IRON SATN MFR SERPL: 31 %
IRON SERPL-MCNC: 108 UG/DL
MCHC RBC-ENTMCNC: 31.6 PG
MCHC RBC-ENTMCNC: 33 G/DL
MCV RBC AUTO: 95.7 FL
PLATELET # BLD AUTO: 216 K/UL
PMV BLD: 10 FL
RBC # BLD: 3.74 M/UL
RBC # FLD: 14.1 %
TIBC SERPL-MCNC: 351 UG/DL
UIBC SERPL-MCNC: 243 UG/DL
VIT B12 SERPL-MCNC: 877 PG/ML
WBC # FLD AUTO: 5.33 K/UL

## 2019-01-12 NOTE — HISTORY OF PRESENT ILLNESS
[de-identified] : This is a 53-year-old  female is here today for followup visit with her boyfriend.  The patient has chronic iron-deficiency anemia due to menorrhagia.  She has been given parenteral iron treatments with Venofer intermittently a few years ago.   She had a hysterectomy on 11/8/16 and stopped taking her oral iron since then. She had a colonoscopy in 2015 which was normal as per patient.   Today, she states she had a seizure recently and her medication, Depakote, was increased by her neurologist. [de-identified] : 3/16/18:\par Doing well. Denies any blood in the stool or any other complaints. C/o occasional SOB which she attributes to asthma. She is on PO iron pills and folic acid currently. Seizures controlled, follows up with neuro. Uptodate with mammogram. Last ferritin was 107.\par \par 7/5/18:\par The patient is here for f/u. She feels well. Her anemia has improved since hysterectomy. She has seizure disorder and has seen by neurologist.\par \par 1/10/19:\par The patient is here for followup visit. She feels well. She has a history of iron deficiency anemia due to menorrhagia which has resolved since hysterectomy. She has seizure disorder and has been followed by neurologist.

## 2019-01-12 NOTE — ASSESSMENT
[FreeTextEntry1] : History of  Iron-deficiency anemia due to menorrhagia. S/P hysterectomy.\par \par PLAN:  \par -- We will repeat a CBC and iron studies today. Her Hb is stable. Will f/u repeat iron study results. We will call her with her lab results next week. She will continue to be monitored.\par -- Recommended to take iron pills 3 times a week.\par -- F/u in 6 months. \par -- Continue age-appropriate screening.\par -- Followup with PCP and neurologist.

## 2019-01-22 LAB
LEVETIRACETAM SERPL-MCNC: 23.1 MCG/ML
OXCARBAZEPINE SERPL-MCNC: 18 UG/ML

## 2019-02-05 ENCOUNTER — APPOINTMENT (OUTPATIENT)
Dept: INTERNAL MEDICINE | Facility: CLINIC | Age: 54
End: 2019-02-05

## 2019-02-05 ENCOUNTER — OUTPATIENT (OUTPATIENT)
Dept: OUTPATIENT SERVICES | Facility: HOSPITAL | Age: 54
LOS: 1 days | Discharge: HOME | End: 2019-02-05

## 2019-02-05 VITALS
SYSTOLIC BLOOD PRESSURE: 104 MMHG | BODY MASS INDEX: 25.16 KG/M2 | DIASTOLIC BLOOD PRESSURE: 69 MMHG | WEIGHT: 142 LBS | HEART RATE: 87 BPM | HEIGHT: 63 IN

## 2019-02-05 RX ORDER — FOLIC ACID 1 MG/1
1 TABLET ORAL DAILY
Qty: 30 | Refills: 5 | Status: COMPLETED | COMMUNITY
Start: 2017-01-17 | End: 2019-02-05

## 2019-02-05 NOTE — PLAN
[FreeTextEntry1] : 53 year old F with EVERETTE 2/2 menorrhagia on PO iron , epilepsy , asthma,DLD ,pre diabetes  and depression here for follow up after 10/18 .\par \par # EVERETTE on PO Iron ;\par s/p hysterectomy last yr \par - improving, follows with Dr. Cruz\par -hb ;11.8 , MCV 95.7\par -TIBC 357 ,iron 108 ,ferritin 119\par -cw PO iron and follow up with hematology \par \par # Epilepsy ;\par - cw treatment as per Neurology \par -need to re check drugs levels \par \par \par #Loss of Balance\par - cw PT,feeing better\par \par # Asthma\par - c/w Ventolin inhaler as needed\par \par #pre diabetes;\par last hgA1c ; 5.9\par diet modification \par \par #DLD; \par dietary modification \par \par # HCM\par - last Mammogram negative 11/28/18 ,up to dated\par - no need to get pap smear .s/p hysterectomy last yr \par -  hx of 11/14 colonoscopy ,negative for any pathology \par -got flu shot in 10/18\par -follow up in 6 mnths or PRN.

## 2019-02-05 NOTE — HISTORY OF PRESENT ILLNESS
[FreeTextEntry1] : follow up after 10/18  [de-identified] : 53 year old F with EVERETTE 2/2 menorrhagia on PO iron , epilepsy , asthma,DLD ,pre diabetes ,hx of craniotomy in 2005  and depression here for follow up after 10/18 .\par She is following Dr Cruz for EVERETTE ,has seen Hematology on 1/10/19. her Iron studies were repeated by them,which showed TIBC of 357 ,iron 108 and Hg 11.8.Also she is not taking iron any more.She had her hysterectomy done last yr due to menorrhagia .\par She also following Dr Olmedo for her epilepsy .Her last seizure is 3 week ago .\par Today she has no complaints ,need medications renewal and wants to know about her lab work.\par also going 2 times to get PT  and feeling better .

## 2019-02-05 NOTE — PHYSICAL EXAM
[No Acute Distress] : no acute distress [Normal Sclera/Conjunctiva] : normal sclera/conjunctiva [No JVD] : no jugular venous distention [No Respiratory Distress] : no respiratory distress  [Clear to Auscultation] : lungs were clear to auscultation bilaterally [Normal Rate] : normal rate  [Normal S1, S2] : normal S1 and S2 [No Edema] : there was no peripheral edema [Soft] : abdomen soft [Non Tender] : non-tender [Non-distended] : non-distended [No HSM] : no HSM [Normal Bowel Sounds] : normal bowel sounds [Declined Rectal Exam] : declined rectal exam [No CVA Tenderness] : no CVA  tenderness [No Focal Deficits] : no focal deficits [Alert and Oriented x3] : oriented to person, place, and time

## 2019-02-08 DIAGNOSIS — D50.8 OTHER IRON DEFICIENCY ANEMIAS: ICD-10-CM

## 2019-02-08 DIAGNOSIS — G40.909 EPILEPSY, UNSPECIFIED, NOT INTRACTABLE, WITHOUT STATUS EPILEPTICUS: ICD-10-CM

## 2019-04-01 ENCOUNTER — OUTPATIENT (OUTPATIENT)
Dept: OUTPATIENT SERVICES | Facility: HOSPITAL | Age: 54
LOS: 1 days | End: 2019-04-01
Payer: MEDICAID

## 2019-04-01 PROCEDURE — G9001: CPT

## 2019-04-06 ENCOUNTER — TRANSCRIPTION ENCOUNTER (OUTPATIENT)
Age: 54
End: 2019-04-06

## 2019-05-09 ENCOUNTER — OUTPATIENT (OUTPATIENT)
Dept: OUTPATIENT SERVICES | Facility: HOSPITAL | Age: 54
LOS: 1 days | Discharge: HOME | End: 2019-05-09

## 2019-05-09 ENCOUNTER — APPOINTMENT (OUTPATIENT)
Dept: INTERNAL MEDICINE | Facility: CLINIC | Age: 54
End: 2019-05-09

## 2019-05-09 VITALS
SYSTOLIC BLOOD PRESSURE: 124 MMHG | BODY MASS INDEX: 26.22 KG/M2 | TEMPERATURE: 97.8 F | WEIGHT: 148 LBS | HEIGHT: 63 IN | DIASTOLIC BLOOD PRESSURE: 74 MMHG | HEART RATE: 88 BPM

## 2019-05-09 NOTE — PHYSICAL EXAM
[No Acute Distress] : no acute distress [No Respiratory Distress] : no respiratory distress  [Well Nourished] : well nourished [Clear to Auscultation] : lungs were clear to auscultation bilaterally [No Accessory Muscle Use] : no accessory muscle use [Normal Rate] : normal rate  [Regular Rhythm] : with a regular rhythm [Normal S1, S2] : normal S1 and S2 [Soft] : abdomen soft [Non Tender] : non-tender [Non-distended] : non-distended

## 2019-05-09 NOTE — ASSESSMENT
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 with breakthrough seizures, and depression here for follow up\par \par #Epilepsy 2/2 brain mass/AVM s/p resection craniotomy with breakthrough seizures\par - she is still having breakthrough seizures\par - c/w treatment as per Neurology \par - C/w keppra , valporic acid, and oxcarbazepine  \par \par #Incontinence\par - on exertion \par - likely stress incontinence\par - will refer to urology, check u/a and renal bladder us\par \par #PT \par - patient requests increased PT hours \par - will refer to social work \par \par # EVERETTE \par - s/p hysterectomy last yr \par -TIBC 357 ,iron 108 ,ferritin 119\par - follow up with hematology \par -cw PO iron and follow up with hematology \par \par #Left sided weakness/ loss of Balance\par - cw PT,feeing better and strength is improving \par \par # Asthma\par - c/w Ventolin inhaler as needed\par \par #pre diabetes;\par last hgA1c; 5.9\par diet modification \par \par #DLD\par - \par - dietary modification \par \par # HCM\par - last Mammogram negative 11/28/18 ,up to dated\par - no need to get pap smear.s/p hysterectomy last yr \par - hx of 11/14 colonoscopy ,negative for any pathology \par - Follow up in 6 mnths or PRN. \par - Will check routine labs. cbc, cmp, lipid, a1c\par

## 2019-05-13 DIAGNOSIS — E78.5 HYPERLIPIDEMIA, UNSPECIFIED: ICD-10-CM

## 2019-05-13 DIAGNOSIS — R73.03 PREDIABETES: ICD-10-CM

## 2019-05-13 DIAGNOSIS — R32 UNSPECIFIED URINARY INCONTINENCE: ICD-10-CM

## 2019-05-13 DIAGNOSIS — F32.9 MAJOR DEPRESSIVE DISORDER, SINGLE EPISODE, UNSPECIFIED: ICD-10-CM

## 2019-05-13 DIAGNOSIS — D64.9 ANEMIA, UNSPECIFIED: ICD-10-CM

## 2019-05-14 ENCOUNTER — OUTPATIENT (OUTPATIENT)
Dept: OUTPATIENT SERVICES | Facility: HOSPITAL | Age: 54
LOS: 1 days | Discharge: HOME | End: 2019-05-14
Payer: MEDICAID

## 2019-05-14 ENCOUNTER — APPOINTMENT (OUTPATIENT)
Dept: NEUROLOGY | Facility: CLINIC | Age: 54
End: 2019-05-14

## 2019-05-14 VITALS
WEIGHT: 150 LBS | BODY MASS INDEX: 26.58 KG/M2 | HEART RATE: 98 BPM | SYSTOLIC BLOOD PRESSURE: 104 MMHG | DIASTOLIC BLOOD PRESSURE: 70 MMHG | HEIGHT: 63 IN

## 2019-05-14 PROCEDURE — 99213 OFFICE O/P EST LOW 20 MIN: CPT

## 2019-05-14 NOTE — REVIEW OF SYSTEMS
[As Noted in HPI] : as noted in HPI [Memory Lapses or Loss] : memory loss [Arm Weakness] : arm weakness [Leg Weakness] : leg weakness [Decr. Concentrating Ability] : decreased concentrating ability [Numbness] : numbness [Seizures] : convulsions [Difficulty Walking] : difficulty walking [Anxiety] : anxiety [Negative] : Heme/Lymph [Confused or Disoriented] : no confusion [Difficulty with Language] : no ~M difficulty with language [Facial Weakness] : no facial weakness [Dizziness] : no dizziness [Abnormal Sensation] : no abnormal sensation [Tingling] : no tingling [Fainting] : no fainting [Lightheadedness] : no lightheadedness [Ataxia] : no ataxia [Inability to Walk] : able to walk [Change In Personality] : no personality change [Suicidal] : not suicidal [de-identified] : C/o small mass on midline in lumbar region

## 2019-05-14 NOTE — END OF VISIT
[] : Resident [FreeTextEntry3] : Pt examined.  She walks with limp.  She has hx of AVM resection and has seizure d/o related to this.  Had EMU monitoring a couple of years ago after experiencing 4 seizures in one day  and adjustment of anticonvulsants.  She Reports some worsening of seizures in past month.  Had 4 over the course of a month.  Most are focal, though last one involved bilateral twiching w/o LOC.  Will increase depakote a little and get trough levels.  Option for another EMU monitoring if seizures do not improve.  MRI brain w/ and w/o contrast will be ordered to asses for any radiographic changes to explain seizure increase.  ALso spoke at length re: proper sleep ( states she states up late at night watching TV.  She should return in 1 month.

## 2019-05-14 NOTE — ASSESSMENT
[FreeTextEntry1] : Ms. Segal is a 54 yo F h/o anemia, anxiety, brain mass/AVM resection s/p craniotomy with LRE with breakthrough seizures s/p VEEG presenting for 4 recent episodes of seizure within 1 month. Pt compliant with seizure medications. \par \par # Epilepsy- last episode 2 days ago, recently had 4 episodes in the last month\par -Obtain Depakote level,  trileptal and keppra levels\par -Continue with Depakote 500 BID and will increased extra dose to 250mg qhs\par -Continue trileptal and keppra\par -MRI brain w/w/o contrast\par -BNP to r/o hyponatremia \par -F/u outpatient neuro in 4 weeks\par \par

## 2019-05-14 NOTE — HISTORY OF PRESENT ILLNESS
[FreeTextEntry1] : 52 yo F h/o brain mass/AVM resection craniotomy with LRE with breakthrough seizures s/p VEEG here for follow visit, and anemia who presents for visit as pt has had recent increase in frequency of seizures.  Pt reports that she had four episodes since her last visit in October 2018 where she has twitching and jerking of the left upper and lower extremities, lasting a few seconds, and is not associated with dizziness, LOC, tongue biting. Her last episode, however, which occurred two days ago included both right and left sides which is atypical as her seizures typically only involve the left side. She reports that she is compliant with her seizure medications, and has had no recent medication changes. She states she knows when she is going to have a seizure because she develops palpitations and difficulty breathing prior to seizure onset. Pt also complains of headaches, both temples, not associated with nausea/vomiting. Pt also reports that she has had difficulty with maintaining balance as she has left sided deficits after a stroke she had; pt is not good historian and is unable to provide further details on her stroke. Pt denies fever, chills, lightheadeness, dizziness, N/V/D, SOB, no recent travel or sick contacts.

## 2019-05-14 NOTE — PHYSICAL EXAM
[General Appearance - Alert] : alert [General Appearance - Well Nourished] : well nourished [General Appearance - In No Acute Distress] : in no acute distress [Affect] : the affect was normal [Mood] : the mood was normal [Person] : oriented to person [Place] : oriented to place [I: Normal Smell] : smell intact bilaterally [Cranial Nerves Trigeminal (V)] : facial sensation intact symmetrically [Cranial Nerves Oculomotor (III)] : extraocular motion intact [Cranial Nerves Optic (II)] : visual acuity intact bilaterally,  visual fields full to confrontation, pupils equal round and reactive to light [Cranial Nerves Facial (VII)] : face symmetrical [Cranial Nerves Accessory (XI - Cranial And Spinal)] : head turning and shoulder shrug symmetric [Cranial Nerves Vestibulocochlear (VIII)] : hearing was intact bilaterally [Cranial Nerves Glossopharyngeal (IX)] : tongue and palate midline [Cranial Nerves Hypoglossal (XII)] : there was no tongue deviation with protrusion [3+] : Patella right 3+ [2+] : Ankle jerk right 2+ [PERRL With Normal Accommodation] : pupils were equal in size, round, reactive to light, with normal accommodation [Sclera] : the sclera and conjunctiva were normal [Outer Ear] : the ears and nose were normal in appearance [Hearing Threshold Finger Rub Not Keith] : hearing was normal [Neck Appearance] : the appearance of the neck was normal [Respiration, Rhythm And Depth] : normal respiratory rhythm and effort [Heart Rate And Rhythm] : heart rate was normal and rhythm regular [Heart Sounds] : normal S1 and S2 [No CVA Tenderness] : no ~M costovertebral angle tenderness [Abdomen Tenderness] : non-tender [Abdomen Soft] : soft [Nail Clubbing] : no clubbing  or cyanosis of the fingernails [No Spinal Tenderness] : no spinal tenderness [Motor Tone] : muscle strength and tone were normal [Skin Color & Pigmentation] : normal skin color and pigmentation [] : no rash [FreeTextEntry1] : Pt oriented to person and place, not date [FreeTextEntry8] : Pt has limp appreciated on gait [FreeTextEntry7] : Sensation to light touch and pin prick decreased in left UE [Time] : disoriented to time

## 2019-05-16 ENCOUNTER — LABORATORY RESULT (OUTPATIENT)
Age: 54
End: 2019-05-16

## 2019-05-16 DIAGNOSIS — Z71.89 OTHER SPECIFIED COUNSELING: ICD-10-CM

## 2019-05-20 LAB
25(OH)D3 SERPL-MCNC: 21 NG/ML
ALBUMIN SERPL ELPH-MCNC: 4.5 G/DL
ALP BLD-CCNC: 92 U/L
ALT SERPL-CCNC: 22 U/L
ANION GAP SERPL CALC-SCNC: 14 MMOL/L
APPEARANCE: ABNORMAL
AST SERPL-CCNC: 15 U/L
BASOPHILS # BLD AUTO: 0.02 K/UL
BASOPHILS NFR BLD AUTO: 0.3 %
BILIRUB SERPL-MCNC: <0.2 MG/DL
BILIRUBIN URINE: NEGATIVE
BLOOD URINE: NEGATIVE
BUN SERPL-MCNC: 17 MG/DL
CALCIUM SERPL-MCNC: 9.3 MG/DL
CHLORIDE SERPL-SCNC: 102 MMOL/L
CHOLEST SERPL-MCNC: 211 MG/DL
CHOLEST/HDLC SERPL: 3.1 RATIO
CO2 SERPL-SCNC: 26 MMOL/L
COLOR: YELLOW
CREAT SERPL-MCNC: 0.5 MG/DL
EOSINOPHIL # BLD AUTO: 0.19 K/UL
EOSINOPHIL NFR BLD AUTO: 2.9 %
ESTIMATED AVERAGE GLUCOSE: 123 MG/DL
GLUCOSE QUALITATIVE U: NEGATIVE
GLUCOSE SERPL-MCNC: 102 MG/DL
HBA1C MFR BLD HPLC: 5.9 %
HCT VFR BLD CALC: 36.9 %
HDLC SERPL-MCNC: 67 MG/DL
HGB BLD-MCNC: 12 G/DL
IMM GRANULOCYTES NFR BLD AUTO: 0.3 %
KETONES URINE: ABNORMAL
LDLC SERPL CALC-MCNC: 139 MG/DL
LEUKOCYTE ESTERASE URINE: NEGATIVE
LYMPHOCYTES # BLD AUTO: 3.32 K/UL
LYMPHOCYTES NFR BLD AUTO: 49.9 %
MAN DIFF?: NORMAL
MCHC RBC-ENTMCNC: 31.1 PG
MCHC RBC-ENTMCNC: 32.5 G/DL
MCV RBC AUTO: 95.6 FL
MONOCYTES # BLD AUTO: 0.89 K/UL
MONOCYTES NFR BLD AUTO: 13.4 %
NEUTROPHILS # BLD AUTO: 2.21 K/UL
NEUTROPHILS NFR BLD AUTO: 33.2 %
NITRITE URINE: NEGATIVE
PH URINE: 6
PLATELET # BLD AUTO: 239 K/UL
POTASSIUM SERPL-SCNC: 4.3 MMOL/L
PROT SERPL-MCNC: 7.3 G/DL
PROTEIN URINE: ABNORMAL
RBC # BLD: 3.86 M/UL
RBC # FLD: 14.6 %
SODIUM SERPL-SCNC: 142 MMOL/L
SPECIFIC GRAVITY URINE: >=1.03
TRIGL SERPL-MCNC: 124 MG/DL
TSH SERPL-ACNC: 2.04 UIU/ML
UROBILINOGEN URINE: 0.2 (ref 0.2–?)
WBC # FLD AUTO: 6.65 K/UL

## 2019-05-29 ENCOUNTER — FORM ENCOUNTER (OUTPATIENT)
Age: 54
End: 2019-05-29

## 2019-05-30 ENCOUNTER — OUTPATIENT (OUTPATIENT)
Dept: OUTPATIENT SERVICES | Facility: HOSPITAL | Age: 54
LOS: 1 days | Discharge: HOME | End: 2019-05-30
Payer: MEDICAID

## 2019-05-30 DIAGNOSIS — R51 HEADACHE: ICD-10-CM

## 2019-05-30 DIAGNOSIS — R42 DIZZINESS AND GIDDINESS: ICD-10-CM

## 2019-05-30 PROCEDURE — 70553 MRI BRAIN STEM W/O & W/DYE: CPT | Mod: 26

## 2019-06-12 ENCOUNTER — RX RENEWAL (OUTPATIENT)
Age: 54
End: 2019-06-12

## 2019-06-14 ENCOUNTER — OUTPATIENT (OUTPATIENT)
Dept: OUTPATIENT SERVICES | Facility: HOSPITAL | Age: 54
LOS: 1 days | Discharge: HOME | End: 2019-06-14

## 2019-06-14 DIAGNOSIS — R56.9 UNSPECIFIED CONVULSIONS: ICD-10-CM

## 2019-06-18 ENCOUNTER — APPOINTMENT (OUTPATIENT)
Dept: NEUROLOGY | Facility: CLINIC | Age: 54
End: 2019-06-18
Payer: MEDICAID

## 2019-06-18 ENCOUNTER — OUTPATIENT (OUTPATIENT)
Dept: OUTPATIENT SERVICES | Facility: HOSPITAL | Age: 54
LOS: 1 days | Discharge: HOME | End: 2019-06-18

## 2019-06-18 VITALS — HEART RATE: 88 BPM | SYSTOLIC BLOOD PRESSURE: 123 MMHG | DIASTOLIC BLOOD PRESSURE: 80 MMHG

## 2019-06-18 DIAGNOSIS — R56.9 UNSPECIFIED CONVULSIONS: ICD-10-CM

## 2019-06-18 DIAGNOSIS — R26.89 OTHER ABNORMALITIES OF GAIT AND MOBILITY: ICD-10-CM

## 2019-06-18 LAB
ANION GAP SERPL CALC-SCNC: 13 MMOL/L
BUN SERPL-MCNC: 18 MG/DL
CALCIUM SERPL-MCNC: 9.6 MG/DL
CHLORIDE SERPL-SCNC: 102 MMOL/L
CO2 SERPL-SCNC: 26 MMOL/L
CREAT SERPL-MCNC: 0.5 MG/DL
GLUCOSE SERPL-MCNC: 101 MG/DL
LEVETIRACETAM SERPL-MCNC: 44.4 MCG/ML
POTASSIUM SERPL-SCNC: 4.5 MMOL/L
SODIUM SERPL-SCNC: 141 MMOL/L
VALPROATE SERPL-MCNC: 103 UG/ML

## 2019-06-18 PROCEDURE — ZZZZZ: CPT

## 2019-06-18 RX ORDER — DIVALPROEX SODIUM 125 MG/1
125 TABLET, DELAYED RELEASE ORAL
Qty: 30 | Refills: 5 | Status: DISCONTINUED | COMMUNITY
Start: 2017-11-14 | End: 2019-06-18

## 2019-06-18 NOTE — END OF VISIT
[] : Resident [FreeTextEntry3] : Pt examined.  I spoke with pt and her .  Seizures seem under better control now.  It is not clear what triggered the 4 occuring a few months ago.She has poor sleep hygiene and we discussed improving sleep at length.  She seems to compulsively watch movies staying up late.  I told her and  doctors orders are to go to bed before midnight (usually up till 01:30).  I said her  may have to unplug the TV.  They seemed agreeable to this plan.  Continue current anticonvulsants and return in 3 months.

## 2019-06-18 NOTE — REVIEW OF SYSTEMS
[Arm Weakness] : arm weakness [Hand Weakness] :  hand weakness [Leg Weakness] : leg weakness [Poor Coordination] : poor coordination [Numbness] : numbness [Difficulty Walking] : difficulty walking [Ataxia] : ataxia [Limping] : limping [Negative] : Gastrointestinal [Seizures] : no convulsions [Dizziness] : no dizziness [Lightheadedness] : no lightheadedness [Fainting] : no fainting [Cluster Headache] : no cluster headache [Tension Headache] : no tension-type headache [de-identified] : L arm/leg weakness and spasticity, L leg numbness

## 2019-06-18 NOTE — HISTORY OF PRESENT ILLNESS
[FreeTextEntry1] : 54F with epilepsy secondary brain mass/AVM (1986) s/p resection/craniotomy (2005) with breakthrough seizures, EVERETTE due to menorrhagia s/p hysterectomy, Asthma, DLD, pre diabetes and depression here for follow up after 5/14/19. \par \par At prior visit noted to still be having breakthru seizures despite reported compliance and worsening as pt previously only had seizures that involved L side now involving both sides and LOC. Patient was ordered for BMP, MRI, and depakote/keppra levels. Increase in depakote at that time. \par \par Since then no seizures in last month. Only thing on further history - pt has poor sleep ~4hr/day, but chronic not acute change. Mild headaches resolved with advil. No falls or change in weakness. Complaint that she needs PT and insurance told her she is 'out of sessions' and to call [phone] and fax  [phone]. Advised no driving/tub baths given risk.

## 2019-06-18 NOTE — DISCUSSION/SUMMARY
[FreeTextEntry1] : Ms. Segal is 55yo F hx of seizures due to prior resection of brain mass/AVM with breakthrough, anemia, anxiety/depression. \par \par #Epilepsy

## 2019-06-18 NOTE — ASSESSMENT
[FreeTextEntry1] : 55 yo F PMH of seizures with cranial mass & AVM s/p resection presenting for follow up after breakthrough seizures after having depakote increased. \par \par #Breakthrough seizures on meds with continued left sided deficits \par - high risk of falls\par - c/w current meds with increased depakote 250 (refill sent)\par - depakote/keppra level noted\par - MRI results reviewed\par - No tub baths, no driving advised \par - new PT referral sent \par - f/u in 2-3mo\par \par #Insomnia\par - sleep hygiene advised \par \par #Loss of balance\par - counseled for fall risks\par - patient should continue physical therapy as she is high risk for falls, still actively seizing and having difficulties with leg. Patient's functional status improving with physical therapy

## 2019-06-18 NOTE — PHYSICAL EXAM
[General Appearance - Alert] : alert [General Appearance - In No Acute Distress] : in no acute distress [Oriented To Time, Place, And Person] : oriented to person, place, and time [Impaired Insight] : insight and judgment were intact [Affect] : the affect was normal [Cranial Nerves Optic (II)] : visual acuity intact bilaterally,  visual fields full to confrontation, pupils equal round and reactive to light [Cranial Nerves Oculomotor (III)] : extraocular motion intact [Cranial Nerves Trigeminal (V)] : facial sensation intact symmetrically [Cranial Nerves Facial (VII)] : face symmetrical [Cranial Nerves Vestibulocochlear (VIII)] : hearing was intact bilaterally [Cranial Nerves Glossopharyngeal (IX)] : tongue and palate midline [Cranial Nerves Accessory (XI - Cranial And Spinal)] : head turning and shoulder shrug symmetric [Cranial Nerves Hypoglossal (XII)] : there was no tongue deviation with protrusion [Motor Strength Upper Extremities Left] : there was weakness of the left upper extremity [Sensation Tactile Decrease] : light touch was intact [Motor Strength Lower Extremities Left] : there was weakness of the left lower extremity [Limited Balance] : the patient's balance was impaired [2+] : Ankle jerk right 2+ [3+] : Ankle jerk left 3+ [Sclera] : the sclera and conjunctiva were normal [PERRL With Normal Accommodation] : pupils were equal in size, round, reactive to light, with normal accommodation [Extraocular Movements] : extraocular movements were intact [Outer Ear] : the ears and nose were normal in appearance [Oropharynx] : the oropharynx was normal [Neck Appearance] : the appearance of the neck was normal [Neck Cervical Mass (___cm)] : no neck mass was observed [Jugular Venous Distention Increased] : there was no jugular-venous distention [Thyroid Diffuse Enlargement] : the thyroid was not enlarged [Thyroid Nodule] : there were no palpable thyroid nodules [Auscultation Breath Sounds / Voice Sounds] : lungs were clear to auscultation bilaterally [Heart Rate And Rhythm] : heart rate was normal and rhythm regular [Heart Sounds] : normal S1 and S2 [Heart Sounds Gallop] : no gallops [Murmurs] : no murmurs [Heart Sounds Pericardial Friction Rub] : no pericardial rub [Full Pulse] : the pedal pulses are present [Edema] : there was no peripheral edema [Bowel Sounds] : normal bowel sounds [Abdomen Soft] : soft [Abdomen Tenderness] : non-tender [Abdomen Mass (___ Cm)] : no abdominal mass palpated [] : no rash [FreeTextEntry6] : increased tone of L leg, inc spasticity of L leg [FreeTextEntry8] : ataxic gait, L [place] [FreeTextEntry7] : decreased touch sensation of left leg

## 2019-07-07 ENCOUNTER — RX RENEWAL (OUTPATIENT)
Age: 54
End: 2019-07-07

## 2019-07-10 ENCOUNTER — OUTPATIENT (OUTPATIENT)
Dept: OUTPATIENT SERVICES | Facility: HOSPITAL | Age: 54
LOS: 1 days | Discharge: HOME | End: 2019-07-10

## 2019-07-10 DIAGNOSIS — G40.909 EPILEPSY, UNSPECIFIED, NOT INTRACTABLE, WITHOUT STATUS EPILEPTICUS: ICD-10-CM

## 2019-07-11 ENCOUNTER — LABORATORY RESULT (OUTPATIENT)
Age: 54
End: 2019-07-11

## 2019-07-11 ENCOUNTER — APPOINTMENT (OUTPATIENT)
Dept: HEMATOLOGY ONCOLOGY | Facility: CLINIC | Age: 54
End: 2019-07-11
Payer: MEDICAID

## 2019-07-11 ENCOUNTER — OUTPATIENT (OUTPATIENT)
Dept: OUTPATIENT SERVICES | Facility: HOSPITAL | Age: 54
LOS: 1 days | Discharge: HOME | End: 2019-07-11

## 2019-07-11 VITALS
TEMPERATURE: 98.4 F | HEART RATE: 81 BPM | BODY MASS INDEX: 26.22 KG/M2 | DIASTOLIC BLOOD PRESSURE: 70 MMHG | SYSTOLIC BLOOD PRESSURE: 105 MMHG | WEIGHT: 148 LBS

## 2019-07-11 PROCEDURE — 99213 OFFICE O/P EST LOW 20 MIN: CPT

## 2019-07-21 NOTE — ASSESSMENT
[FreeTextEntry1] : History of  Iron-deficiency anemia due to menorrhagia. S/P hysterectomy.\par \par PLAN:  \par -- CBC reviewed. Her hb remains normal.\par -- Additional Blood work ordered: BMP, Iron studies, Ferritin.\par -- Continue age-appropriate screening.\par -- Followup with PCP and neurologist.\par -- Follow up with Dr. Cruz as needed.\par \par Case was seen and discussed with Dr. Cruz who agreed with the assessment and plan.\par

## 2019-07-21 NOTE — HISTORY OF PRESENT ILLNESS
[de-identified] : This is a 53-year-old  female is here today for followup visit with her boyfriend.  The patient has chronic iron-deficiency anemia due to menorrhagia.  She has been given parenteral iron treatments with Venofer intermittently a few years ago.   She had a hysterectomy on 11/8/16 and stopped taking her oral iron since then. She had a colonoscopy in 2015 which was normal as per patient.   Today, she states she had a seizure recently and her medication, Depakote, was increased by her neurologist. [de-identified] : 3/16/18:\par Doing well. Denies any blood in the stool or any other complaints. C/o occasional SOB which she attributes to asthma. She is on PO iron pills and folic acid currently. Seizures controlled, follows up with neuro. Uptodate with mammogram. Last ferritin was 107.\par \par 7/5/18:\par The patient is here for f/u. She feels well. Her anemia has improved since hysterectomy. She has seizure disorder and has seen by neurologist.\par \par 1/10/19:\par The patient is here for followup visit. She feels well. She has a history of iron deficiency anemia due to menorrhagia which has resolved since hysterectomy. She has seizure disorder and has been followed by neurologist.\par \par 7/11/19\par Patient is here today for follow up visit.  She offers no new complaints. She has a history of iron deficiency anemia due to menorrhagia which has resolved since hysterectomy. She is no longer taking iron pills. Latest CBC on 5/16/19 shows WBC 6.65, Hgb 12.0, Plts 239. Latest Ferritin = 119 on 1/10/19. \par She has seizure disorder and has been followed by neurologist, Dr. Manjinder Kohli.

## 2019-07-29 DIAGNOSIS — D50.9 IRON DEFICIENCY ANEMIA, UNSPECIFIED: ICD-10-CM

## 2019-08-05 ENCOUNTER — APPOINTMENT (OUTPATIENT)
Dept: INTERNAL MEDICINE | Facility: CLINIC | Age: 54
End: 2019-08-05

## 2019-08-05 ENCOUNTER — OUTPATIENT (OUTPATIENT)
Dept: OUTPATIENT SERVICES | Facility: HOSPITAL | Age: 54
LOS: 1 days | Discharge: HOME | End: 2019-08-05

## 2019-08-05 VITALS
HEIGHT: 63 IN | SYSTOLIC BLOOD PRESSURE: 136 MMHG | HEART RATE: 96 BPM | DIASTOLIC BLOOD PRESSURE: 88 MMHG | BODY MASS INDEX: 25.87 KG/M2 | WEIGHT: 146 LBS

## 2019-08-05 NOTE — REVIEW OF SYSTEMS
[Incontinence] : incontinence [Headache] : headache [Negative] : ENT [Fever] : no fever [Chills] : no chills [Night Sweats] : no night sweats [Fatigue] : no fatigue [Hot Flashes] : no hot flashes [Discharge] : no discharge [Pain] : no pain [Redness] : no redness [Dryness] : no dryness  [Vision Problems] : no vision problems [Itching] : no itching [Chest Pain] : no chest pain [Palpitations] : no palpitations [Lower Ext Edema] : no lower extremity edema [Leg Claudication] : no leg claudication [Orthopnea] : no orthopnea [Paroysmal Nocturnal Dyspnea] : no paroysmal nocturnal dyspnea [Shortness Of Breath] : no shortness of breath [Wheezing] : no wheezing [Cough] : no cough [Dyspnea on Exertion] : no dyspnea on exertion [Abdominal Pain] : no abdominal pain [Constipation] : no constipation [Nausea] : no nausea [Diarrhea] : diarrhea [Heartburn] : no heartburn [Vomiting] : no vomiting [Melena] : no melena [Hematuria] : no hematuria [Dysuria] : no dysuria [Joint Pain] : no joint pain [Muscle Pain] : no muscle pain [Skin Rash] : no skin rash [Dizziness] : no dizziness [Fainting] : no fainting [Unsteady Walking] : no ataxia [Memory Loss] : no memory loss [Insomnia] : no insomnia [Suicidal] : not suicidal [Anxiety] : no anxiety [Depression] : no depression [Easy Bleeding] : no easy bleeding [Easy Bruising] : no easy bruising [Swollen Glands] : no swollen glands

## 2019-08-05 NOTE — HISTORY OF PRESENT ILLNESS
[FreeTextEntry1] : 54F complains of urine spilling when she laughs [de-identified] : 54 year old F with EVERETTE 2/2 menorrhagia on PO iron , epilepsy , asthma,DLD ,pre diabetes ,hx of craniotomy in 2005 and depression here for follow up after 5/2019\par She is following Dr Cruz for EVERETTE ,has seen Hematology on 1/10/19. her Iron studies were repeated by them,which showed TIBC of 357 ,iron 108 and Hg 11.8.Also she is not taking iron any more.She had her hysterectomy done last yr due to menorrhagia.\par She also following Dr dangelo for her epilepsy.\par Today she complains about urinary continence in the setting of laughing and sudden movements. she also needs medications renewal and wants to know about her lab work.\par also going 2 times to get PT and feeling better.

## 2019-08-05 NOTE — PHYSICAL EXAM
[No Acute Distress] : no acute distress [Well-Appearing] : well-appearing [Normal Sclera/Conjunctiva] : normal sclera/conjunctiva [EOMI] : extraocular movements intact [Normal Outer Ear/Nose] : the outer ears and nose were normal in appearance [No JVD] : no jugular venous distention [No Accessory Muscle Use] : no accessory muscle use [No Respiratory Distress] : no respiratory distress  [Clear to Auscultation] : lungs were clear to auscultation bilaterally [Normal Rate] : normal rate  [Regular Rhythm] : with a regular rhythm [Normal S1, S2] : normal S1 and S2 [No Murmur] : no murmur heard [No Edema] : there was no peripheral edema [Soft] : abdomen soft [Non Tender] : non-tender [Non-distended] : non-distended [No Masses] : no abdominal mass palpated [Normal Bowel Sounds] : normal bowel sounds [No Focal Deficits] : no focal deficits [Speech Grossly Normal] : speech grossly normal

## 2019-08-05 NOTE — HISTORY OF PRESENT ILLNESS
[FreeTextEntry1] : 54F complains of urine spilling when she laughs [de-identified] : 54 year old F with EVERETTE 2/2 menorrhagia on PO iron , epilepsy , asthma,DLD ,pre diabetes ,hx of craniotomy in 2005 and depression here for follow up after 5/2019\par She is following Dr Cruz for EVERETTE ,has seen Hematology on 1/10/19. her Iron studies were repeated by them,which showed TIBC of 357 ,iron 108 and Hg 11.8.Also she is not taking iron any more.She had her hysterectomy done last yr due to menorrhagia.\par She also following Dr dangelo for her epilepsy.\par Today she complains about urinary continence in the setting of laughing and sudden movements. she also needs medications renewal and wants to know about her lab work.\par also going 2 times to get PT and feeling better.

## 2019-08-05 NOTE — REVIEW OF SYSTEMS
[Incontinence] : incontinence [Headache] : headache [Negative] : ENT [Fever] : no fever [Chills] : no chills [Hot Flashes] : no hot flashes [Night Sweats] : no night sweats [Fatigue] : no fatigue [Discharge] : no discharge [Pain] : no pain [Dryness] : no dryness  [Redness] : no redness [Vision Problems] : no vision problems [Itching] : no itching [Chest Pain] : no chest pain [Palpitations] : no palpitations [Lower Ext Edema] : no lower extremity edema [Leg Claudication] : no leg claudication [Orthopnea] : no orthopnea [Paroysmal Nocturnal Dyspnea] : no paroysmal nocturnal dyspnea [Shortness Of Breath] : no shortness of breath [Wheezing] : no wheezing [Cough] : no cough [Dyspnea on Exertion] : no dyspnea on exertion [Abdominal Pain] : no abdominal pain [Constipation] : no constipation [Nausea] : no nausea [Diarrhea] : diarrhea [Heartburn] : no heartburn [Vomiting] : no vomiting [Melena] : no melena [Dysuria] : no dysuria [Hematuria] : no hematuria [Joint Pain] : no joint pain [Muscle Pain] : no muscle pain [Skin Rash] : no skin rash [Dizziness] : no dizziness [Fainting] : no fainting [Memory Loss] : no memory loss [Unsteady Walking] : no ataxia [Suicidal] : not suicidal [Insomnia] : no insomnia [Anxiety] : no anxiety [Depression] : no depression [Easy Bleeding] : no easy bleeding [Easy Bruising] : no easy bruising [Swollen Glands] : no swollen glands

## 2019-08-05 NOTE — ASSESSMENT
[FreeTextEntry1] : # Iron deficiency anemia on PO Iron ;\par -s/p hysterectomy \par - improving, follows with Dr. Cruz\par -hb ;12.3 , \par -cw PO iron and follow up with hematology \par \par # Epilepsy ;\par - cw treatment as per Neurology \par -need to re check drugs levels \par \par #Loss of Balance\par - cw PT,feeing better\par \par # Asthma\par - c/w Ventolin inhaler as needed\par \par #pre diabetes;\par last hgA1c ; 5.9\par diet modification \par \par #DLD; \par dietary modification \par \par # HCM\par - last Mammogram negative 11/28/18 ,up to dated\par - no need to get pap smear.s/p hysterectomy last yr \par - hx of 11/14 colonoscopy ,negative for any pathology \par -got flu shot in 10/18 f/u next appointment for Flu shot\par -follow up in 3 mnths or PRN.

## 2019-08-09 DIAGNOSIS — R32 UNSPECIFIED URINARY INCONTINENCE: ICD-10-CM

## 2019-08-09 DIAGNOSIS — E78.5 HYPERLIPIDEMIA, UNSPECIFIED: ICD-10-CM

## 2019-08-09 DIAGNOSIS — J45.909 UNSPECIFIED ASTHMA, UNCOMPLICATED: ICD-10-CM

## 2019-08-09 DIAGNOSIS — R56.9 UNSPECIFIED CONVULSIONS: ICD-10-CM

## 2019-08-09 DIAGNOSIS — G40.909 EPILEPSY, UNSPECIFIED, NOT INTRACTABLE, WITHOUT STATUS EPILEPTICUS: ICD-10-CM

## 2019-08-09 DIAGNOSIS — D64.9 ANEMIA, UNSPECIFIED: ICD-10-CM

## 2019-08-09 DIAGNOSIS — F32.9 MAJOR DEPRESSIVE DISORDER, SINGLE EPISODE, UNSPECIFIED: ICD-10-CM

## 2019-08-09 DIAGNOSIS — R73.03 PREDIABETES: ICD-10-CM

## 2019-08-22 LAB
OXCARBAZEPINE SERPL-MCNC: 17 UG/ML
OXCARBAZEPINE SERPL-MCNC: 20 UG/ML

## 2019-09-10 ENCOUNTER — APPOINTMENT (OUTPATIENT)
Dept: NEUROLOGY | Facility: CLINIC | Age: 54
End: 2019-09-10
Payer: MEDICAID

## 2019-09-10 ENCOUNTER — OUTPATIENT (OUTPATIENT)
Dept: OUTPATIENT SERVICES | Facility: HOSPITAL | Age: 54
LOS: 1 days | Discharge: HOME | End: 2019-09-10

## 2019-09-10 PROCEDURE — 99214 OFFICE O/P EST MOD 30 MIN: CPT | Mod: NC

## 2019-09-10 NOTE — HISTORY OF PRESENT ILLNESS
[FreeTextEntry1] : 54F with epilepsy secondary brain mass/AVM (1986) s/p resection/craniotomy (2005) with breakthrough seizures, EVERETTE due to menorrhagia s/p hysterectomy, Asthma, DLD, pre diabetes and depression here for follow up after 6/18/2019.  She has been doing well since then and the only seizures she had were because she missed her seizure medications or took the wrong doses.  She has no PT available for the rest of the year.  No new medical issues\par

## 2019-09-10 NOTE — PHYSICAL EXAM
[FreeTextEntry1] : Alert oriented to person place and date and knows the president\par Slight left facial\par LUE 5-/5 and LLE 4+/5\par increased tone on the left\par DTR 3++ L>R\par FTN NL\par Gait spastic\par

## 2019-09-10 NOTE — DISCUSSION/SUMMARY
[FreeTextEntry1] : Ms. Segal is 53yo F hx of seizures due to prior resection of brain mass/AVM with breakthrough, anemia, anxiety/depression. Currently stable\par 1. Continue current AEDs\par 2. Should do exercise and stretching regularly\par 3. f/u in 6 months

## 2019-09-26 ENCOUNTER — APPOINTMENT (OUTPATIENT)
Dept: UROLOGY | Facility: CLINIC | Age: 54
End: 2019-09-26
Payer: MEDICAID

## 2019-09-26 ENCOUNTER — OUTPATIENT (OUTPATIENT)
Dept: OUTPATIENT SERVICES | Facility: HOSPITAL | Age: 54
LOS: 1 days | Discharge: HOME | End: 2019-09-26

## 2019-09-26 VITALS
HEIGHT: 63 IN | DIASTOLIC BLOOD PRESSURE: 71 MMHG | HEART RATE: 66 BPM | SYSTOLIC BLOOD PRESSURE: 101 MMHG | BODY MASS INDEX: 26.22 KG/M2 | WEIGHT: 148 LBS

## 2019-09-26 PROCEDURE — 99203 OFFICE O/P NEW LOW 30 MIN: CPT

## 2019-09-26 NOTE — ASSESSMENT
[FreeTextEntry1] : 54F with EVERETTE 2/2 menorrhagia s/p hysterectomy, Asthma, DLD, pre diabetes , epilepsy 2/2 brain mass/AVM s/p resection craniotomy with breakthrough seizures, and depression\par \par Today she complains of incontinence in the setting of laughing\par \par May 2019\par HgbA1c: 5.9%\par UA: neg nit and LE, no rbc

## 2019-09-26 NOTE — PHYSICAL EXAM
[General Appearance - Well Nourished] : well nourished [General Appearance - Well Developed] : well developed [Well Groomed] : well groomed [Normal Appearance] : normal appearance [Edema] : no peripheral edema [General Appearance - In No Acute Distress] : no acute distress [Respiration, Rhythm And Depth] : normal respiratory rhythm and effort [Abdomen Soft] : soft [Exaggerated Use Of Accessory Muscles For Inspiration] : no accessory muscle use [Abdomen Tenderness] : non-tender [Costovertebral Angle Tenderness] : no ~M costovertebral angle tenderness [Normal Station and Gait] : the gait and station were normal for the patient's age [Skin Color & Pigmentation] : normal skin color and pigmentation [] : no rash [No Focal Deficits] : no focal deficits [Affect] : the affect was normal [Oriented To Time, Place, And Person] : oriented to person, place, and time [Not Anxious] : not anxious [Mood] : the mood was normal

## 2019-10-01 ENCOUNTER — FORM ENCOUNTER (OUTPATIENT)
Age: 54
End: 2019-10-01

## 2019-10-02 ENCOUNTER — OUTPATIENT (OUTPATIENT)
Dept: OUTPATIENT SERVICES | Facility: HOSPITAL | Age: 54
LOS: 1 days | Discharge: HOME | End: 2019-10-02
Payer: MEDICAID

## 2019-10-02 DIAGNOSIS — R32 UNSPECIFIED URINARY INCONTINENCE: ICD-10-CM

## 2019-10-02 PROCEDURE — 76857 US EXAM PELVIC LIMITED: CPT | Mod: 26

## 2019-10-20 LAB
ANION GAP SERPL CALC-SCNC: 13 MMOL/L
BUN SERPL-MCNC: 25 MG/DL
CALCIUM SERPL-MCNC: 10 MG/DL
CHLORIDE SERPL-SCNC: 100 MMOL/L
CO2 SERPL-SCNC: 27 MMOL/L
CREAT SERPL-MCNC: 0.6 MG/DL
FERRITIN SERPL-MCNC: 154 NG/ML
GLUCOSE SERPL-MCNC: 110 MG/DL
HCT VFR BLD CALC: 37.6 %
HGB BLD-MCNC: 12.3 G/DL
IRON SATN MFR SERPL: 29 %
IRON SERPL-MCNC: 93 UG/DL
MCHC RBC-ENTMCNC: 31.8 PG
MCHC RBC-ENTMCNC: 32.7 G/DL
MCV RBC AUTO: 97.2 FL
PLATELET # BLD AUTO: 210 K/UL
PMV BLD: 9.8 FL
POTASSIUM SERPL-SCNC: 4.5 MMOL/L
RBC # BLD: 3.87 M/UL
RBC # FLD: 13.7 %
SODIUM SERPL-SCNC: 140 MMOL/L
TIBC SERPL-MCNC: 319 UG/DL
UIBC SERPL-MCNC: 226 UG/DL
WBC # FLD AUTO: 7.61 K/UL

## 2019-11-20 ENCOUNTER — OUTPATIENT (OUTPATIENT)
Dept: OUTPATIENT SERVICES | Facility: HOSPITAL | Age: 54
LOS: 1 days | Discharge: HOME | End: 2019-11-20

## 2019-11-20 ENCOUNTER — APPOINTMENT (OUTPATIENT)
Dept: INTERNAL MEDICINE | Facility: CLINIC | Age: 54
End: 2019-11-20
Payer: MEDICAID

## 2019-11-20 ENCOUNTER — LABORATORY RESULT (OUTPATIENT)
Age: 54
End: 2019-11-20

## 2019-11-20 VITALS
WEIGHT: 151 LBS | BODY MASS INDEX: 26.75 KG/M2 | HEART RATE: 88 BPM | RESPIRATION RATE: 16 BRPM | DIASTOLIC BLOOD PRESSURE: 72 MMHG | HEIGHT: 62.99 IN | SYSTOLIC BLOOD PRESSURE: 113 MMHG

## 2019-11-20 PROCEDURE — 99214 OFFICE O/P EST MOD 30 MIN: CPT | Mod: GC

## 2019-11-20 NOTE — ASSESSMENT
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 with breakthrough seizures, and depression here for follow up\par \par #Epilepsy 2/2 brain mass/AVM s/p resection craniotomy with breakthrough seizures (last seizures 3 weeks ago)\par - compliant with her meds\par - c/w treatment as per Neurology \par - C/w keppra , valporic acid, and oxcarbazepine \par - psychiatry referral for bipolar??\par \par # stress urinary incontinence\par - f/u with Dr taylor on dec 6\par - ORDER UA\par - consider urodynamic study\par \par # Iron def. anemia\par - s/p hysterectomy \par - STOP taking iron supplements\par \par #Left sided weakness/ loss of Balance\par - cw PT,feeing better and strength is improving \par \par # Asthma\par - c/w Ventolin inhaler as needed\par - start inhaled steroids + LABA\par \par #pre diabetes;\par last hgA1c; 5.9\par diet modification \par \par #DLD\par - \par - dietary modification .\par \par # HCM\par - last Mammogram negative 11/28/18 ,up to dated\par - no need to get pap smear.s/p hysterectomy last yr \par - hx of 11/14 colonoscopy ,negative for any pathology \par - Follow up in 6 months or PRN. \par

## 2019-11-20 NOTE — HISTORY OF PRESENT ILLNESS
[FreeTextEntry1] : follow up [de-identified] : 54F with EVERETTE 2/2 menorrhagia s/p hysterectomy, Asthma, DLD, pre diabetes , epilepsy 2/2 brain mass/AVM s/p resection craniotomy with breakthrough seizures, and depression here for follow up\par Today she is still complaining of stress urinary incontinence. she denies any dysuria, no frequency, no intermittency\par

## 2019-11-21 ENCOUNTER — APPOINTMENT (OUTPATIENT)
Dept: OBGYN | Facility: CLINIC | Age: 54
End: 2019-11-21
Payer: MEDICAID

## 2019-11-21 ENCOUNTER — OUTPATIENT (OUTPATIENT)
Dept: OUTPATIENT SERVICES | Facility: HOSPITAL | Age: 54
LOS: 1 days | Discharge: HOME | End: 2019-11-21

## 2019-11-21 VITALS
DIASTOLIC BLOOD PRESSURE: 70 MMHG | HEIGHT: 62 IN | BODY MASS INDEX: 27.42 KG/M2 | WEIGHT: 149 LBS | SYSTOLIC BLOOD PRESSURE: 98 MMHG

## 2019-11-21 PROCEDURE — 99396 PREV VISIT EST AGE 40-64: CPT

## 2019-11-21 NOTE — PHYSICAL EXAM
[Awake] : awake [Alert] : alert [Acute Distress] : no acute distress [Mass] : no breast mass [Nipple Discharge] : no nipple discharge [Axillary LAD] : no axillary lymphadenopathy [Soft] : soft [Tender] : non tender [Oriented x3] : oriented to person, place, and time [Normal] : vagina [Labia Majora] : labia major [Labia Minora] : labia minora [No Bleeding] : there was no active vaginal bleeding [Absent] : absent [Adnexa Absent] : absent bilaterally

## 2019-11-21 NOTE — HISTORY OF PRESENT ILLNESS
[Fair] : being in fair health [Last Mammogram ___] : Last Mammogram was [unfilled] [Postmenopausal] : is postmenopausal [de-identified] : up to date [Currently In Menopause] : currently in menopause [Experiencing Menopausal Sxs] : not experiencing menopausal symptoms [Sexually Active] : is sexually active [Monogamous] : is monogamous [Male ___] : [unfilled] male

## 2019-11-22 DIAGNOSIS — Z01.419 ENCOUNTER FOR GYNECOLOGICAL EXAMINATION (GENERAL) (ROUTINE) WITHOUT ABNORMAL FINDINGS: ICD-10-CM

## 2019-11-25 DIAGNOSIS — E78.5 HYPERLIPIDEMIA, UNSPECIFIED: ICD-10-CM

## 2019-11-25 DIAGNOSIS — G40.909 EPILEPSY, UNSPECIFIED, NOT INTRACTABLE, WITHOUT STATUS EPILEPTICUS: ICD-10-CM

## 2019-11-25 DIAGNOSIS — R32 UNSPECIFIED URINARY INCONTINENCE: ICD-10-CM

## 2019-11-25 DIAGNOSIS — J45.909 UNSPECIFIED ASTHMA, UNCOMPLICATED: ICD-10-CM

## 2019-12-06 ENCOUNTER — OUTPATIENT (OUTPATIENT)
Dept: OUTPATIENT SERVICES | Facility: HOSPITAL | Age: 54
LOS: 1 days | Discharge: HOME | End: 2019-12-06

## 2019-12-06 ENCOUNTER — APPOINTMENT (OUTPATIENT)
Dept: UROGYNECOLOGY | Facility: CLINIC | Age: 54
End: 2019-12-06
Payer: MEDICAID

## 2019-12-06 ENCOUNTER — RESULT CHARGE (OUTPATIENT)
Age: 54
End: 2019-12-06

## 2019-12-06 VITALS — BODY MASS INDEX: 27.62 KG/M2 | SYSTOLIC BLOOD PRESSURE: 98 MMHG | DIASTOLIC BLOOD PRESSURE: 60 MMHG | WEIGHT: 151 LBS

## 2019-12-06 DIAGNOSIS — Z12.4 ENCOUNTER FOR SCREENING FOR MALIGNANT NEOPLASM OF CERVIX: ICD-10-CM

## 2019-12-06 DIAGNOSIS — N92.4 EXCESSIVE BLEEDING IN THE PREMENOPAUSAL PERIOD: ICD-10-CM

## 2019-12-06 DIAGNOSIS — Z01.419 ENCOUNTER FOR GYNECOLOGICAL EXAMINATION (GENERAL) (ROUTINE) W/OUT ABNORMAL FINDINGS: ICD-10-CM

## 2019-12-06 DIAGNOSIS — N39.3 STRESS INCONTINENCE (FEMALE) (MALE): ICD-10-CM

## 2019-12-06 DIAGNOSIS — N39.46 MIXED INCONTINENCE: ICD-10-CM

## 2019-12-06 DIAGNOSIS — Z87.19 PERSONAL HISTORY OF OTHER DISEASES OF THE DIGESTIVE SYSTEM: ICD-10-CM

## 2019-12-06 DIAGNOSIS — Z78.9 OTHER SPECIFIED HEALTH STATUS: ICD-10-CM

## 2019-12-06 DIAGNOSIS — Z90.710 ACQUIRED ABSENCE OF BOTH CERVIX AND UTERUS: ICD-10-CM

## 2019-12-06 DIAGNOSIS — Z09 ENCOUNTER FOR FOLLOW-UP EXAMINATION AFTER COMPLETED TREATMENT FOR CONDITIONS OTHER THAN MALIGNANT NEOPLASM: ICD-10-CM

## 2019-12-06 DIAGNOSIS — R56.9 UNSPECIFIED CONVULSIONS: ICD-10-CM

## 2019-12-06 DIAGNOSIS — R32 UNSPECIFIED URINARY INCONTINENCE: ICD-10-CM

## 2019-12-06 DIAGNOSIS — Z86.59 PERSONAL HISTORY OF OTHER MENTAL AND BEHAVIORAL DISORDERS: ICD-10-CM

## 2019-12-06 DIAGNOSIS — Z87.42 PERSONAL HISTORY OF OTHER DISEASES OF THE FEMALE GENITAL TRACT: ICD-10-CM

## 2019-12-06 DIAGNOSIS — Z86.2 PERSONAL HISTORY OF DISEASES OF THE BLOOD AND BLOOD-FORMING ORGANS AND CERTAIN DISORDERS INVOLVING THE IMMUNE MECHANISM: ICD-10-CM

## 2019-12-06 LAB
BILIRUB UR QL STRIP: NORMAL
CLARITY UR: CLEAR
COLLECTION METHOD: NORMAL
GLUCOSE UR-MCNC: NORMAL
HCG UR QL: 0.2 EU/DL
HGB UR QL STRIP.AUTO: NORMAL
KETONES UR-MCNC: NORMAL
LEUKOCYTE ESTERASE UR QL STRIP: NORMAL
NITRITE UR QL STRIP: NORMAL
PH UR STRIP: 6
PROT UR STRIP-MCNC: NORMAL
SP GR UR STRIP: 1.01

## 2019-12-06 PROCEDURE — 51701 INSERT BLADDER CATHETER: CPT

## 2019-12-06 PROCEDURE — 99204 OFFICE O/P NEW MOD 45 MIN: CPT | Mod: 25

## 2019-12-06 NOTE — DISCUSSION/SUMMARY
[FreeTextEntry1] : \par Stress incontinence-\par The pathophysiology of the above condition was discussed with the patient. The management options for stress incontinence were discussed including observation, pessary placement, pelvic floor physical therapy or surgery (retropubic sling). We will follow up on her urine tests. The patient voiced understanding and agrees with pessary management. She will be scheduled for a pessary fitting.\par \par Atrophic vaginitis-\par We reviewed the risks, benefits, alternatives and indications of local estrogen therapy and I gave her a handout that covers this information. She does opt to begin this therapy. I have given her a prescription and specific instructions on how to use the estrogen cream, applied with a finger at a low dose for urogenital atrophy.\par

## 2019-12-06 NOTE — HISTORY OF PRESENT ILLNESS
[FreeTextEntry1] : \par Pt with pelvic floor dysfunction here for urogynecologic evaluation. She describes: \par Referring provider: Dr Cervantes\par PCP: Dr Salazar\par Gyn: Dr TANVI Mckinnon\par \par Chief PFD: stress incontinence\par \par 10/2/19 bladder ultrasound: PVR 0cc, thickened bladder wall\par \par Pelvic organ prolapse: s/p brain surgery for benign mass, s/p LAVH/BS0 (menorrhagia), reports intermittent slight bulge, denies splinting\par Stress urinary incontinence: with mostly laughing\par Overactive bladder syndrome: hx of seizures, voids q2 hours secondary to urgency, no eneuresis, no urge incontinence, for years, no prior treatment, no glaucoma\par Voiding dysfunction: no Incomplete bladder emptying, no hesitancy \par Lower urinary tract/vaginal symptoms: no recurrent UTIs per year, no hematuria, no dysuria, no bladder pain \par Fecal incontinence: denies\par Defecatory dysfunction: sausage\par Sexual dysfunction: not active\par Pelvic pain: denies\par Vaginal dryness : denies\par \par Her pelvic floor symptoms are significantly bothersome and negatively impacting her quality of life. \par \par

## 2019-12-06 NOTE — COUNSELING
[FreeTextEntry1] : \par We will notify you of the urine results if they are abnormal\par \par Apply a pea size amount of the cream to the opening of the vagina every night for two weeks followed by three nights per week\par \par Please call my office if you have any issues with the cost or side effects of the medication.\par \par Schedule pessary fitting with my PAJada

## 2019-12-07 DIAGNOSIS — N39.3 STRESS INCONTINENCE (FEMALE) (MALE): ICD-10-CM

## 2019-12-07 DIAGNOSIS — N95.2 POSTMENOPAUSAL ATROPHIC VAGINITIS: ICD-10-CM

## 2019-12-08 LAB
APPEARANCE: CLEAR
BACTERIA UR CULT: NORMAL
BILIRUBIN URINE: NEGATIVE
BLOOD URINE: NEGATIVE
COLOR: NORMAL
GLUCOSE QUALITATIVE U: NEGATIVE
KETONES URINE: NEGATIVE
LEUKOCYTE ESTERASE URINE: NEGATIVE
NITRITE URINE: NEGATIVE
PH URINE: 6.5
PROTEIN URINE: NEGATIVE
SPECIFIC GRAVITY URINE: 1.01
UROBILINOGEN URINE: NORMAL

## 2020-01-03 ENCOUNTER — OUTPATIENT (OUTPATIENT)
Dept: OUTPATIENT SERVICES | Facility: HOSPITAL | Age: 55
LOS: 1 days | Discharge: HOME | End: 2020-01-03

## 2020-01-03 ENCOUNTER — APPOINTMENT (OUTPATIENT)
Dept: UROGYNECOLOGY | Facility: CLINIC | Age: 55
End: 2020-01-03
Payer: MEDICAID

## 2020-01-03 VITALS
DIASTOLIC BLOOD PRESSURE: 66 MMHG | HEIGHT: 62 IN | SYSTOLIC BLOOD PRESSURE: 98 MMHG | WEIGHT: 151 LBS | BODY MASS INDEX: 27.79 KG/M2

## 2020-01-03 PROCEDURE — ZZZZZ: CPT

## 2020-01-03 NOTE — COUNSELING
[FreeTextEntry1] : \par Please call the office if you have any issues with vaginal pain, vaginal bleeding, difficulty urinating or having a bowel movement or if the pessary falls out so that we can arrange another size pessary.\par \par Please continue to apply a pea size amount of the cream to the opening of the vagina three nights per week. \par \par Please follow up for pessary maintenance in 2-3 weeks with CHIDI Elias

## 2020-01-03 NOTE — DISCUSSION/SUMMARY
[FreeTextEntry1] : \par Stress Incontinence:\par Ring and Support with knob #3 placed without difficulty. Remained in place with Valsalva and coughing. \par The patient tolerated all fittings well.\par Precautions given.\par The patient will follow up in 2 weeks for routine pessary management and self teaching.\par

## 2020-01-03 NOTE — HISTORY OF PRESENT ILLNESS
[FreeTextEntry1] : \par Patient is here for pessary fitting for stress incontinence. \par Last seen 12/6/19 as NP for stress incontinence and atrophic vaginitis\par \par H/o seizures\par \par s/p brain surgery for benign mass\par s/p LAVH/BSO\par \par Estrace cream for atrophy.\par \par No complaints today.

## 2020-01-03 NOTE — PHYSICAL EXAM
[Well developed] : well developed [No Acute Distress] : in no acute distress [Well Nourished] : ~L well nourished

## 2020-01-15 ENCOUNTER — FORM ENCOUNTER (OUTPATIENT)
Age: 55
End: 2020-01-15

## 2020-01-16 ENCOUNTER — OUTPATIENT (OUTPATIENT)
Dept: OUTPATIENT SERVICES | Facility: HOSPITAL | Age: 55
LOS: 1 days | Discharge: HOME | End: 2020-01-16
Payer: MEDICAID

## 2020-01-16 DIAGNOSIS — Z12.31 ENCOUNTER FOR SCREENING MAMMOGRAM FOR MALIGNANT NEOPLASM OF BREAST: ICD-10-CM

## 2020-01-16 PROCEDURE — 77063 BREAST TOMOSYNTHESIS BI: CPT | Mod: 26

## 2020-01-16 PROCEDURE — 77067 SCR MAMMO BI INCL CAD: CPT | Mod: 26

## 2020-01-22 ENCOUNTER — APPOINTMENT (OUTPATIENT)
Dept: UROGYNECOLOGY | Facility: CLINIC | Age: 55
End: 2020-01-22
Payer: MEDICAID

## 2020-01-22 ENCOUNTER — OUTPATIENT (OUTPATIENT)
Dept: OUTPATIENT SERVICES | Facility: HOSPITAL | Age: 55
LOS: 1 days | Discharge: HOME | End: 2020-01-22

## 2020-01-22 VITALS
SYSTOLIC BLOOD PRESSURE: 98 MMHG | BODY MASS INDEX: 27.79 KG/M2 | DIASTOLIC BLOOD PRESSURE: 66 MMHG | WEIGHT: 151 LBS | HEIGHT: 62 IN

## 2020-01-22 PROCEDURE — ZZZZZ: CPT

## 2020-01-22 NOTE — COUNSELING
[FreeTextEntry1] : \par Please call the office if you have any issues with vaginal pain, vaginal bleeding, difficulty urinating or having a bowel movement or if the pessary falls out so that we can arrange another size pessary.\par \par Please continue to apply a pea size amount of the cream to the opening of the vagina three nights per week.\par \par Please follow up for pessary maintenance in 3 months with CHIDI Elias

## 2020-01-22 NOTE — HISTORY OF PRESENT ILLNESS
[FreeTextEntry1] : \par Patient is here for routine pessary cleaning for stress incontinence.\par Last seen 1/3/19 for pessary fitting. Ring and Support with knob #3\par \par H/o seizures\par \par s/p brain surgery for benign mass\par s/p LAVH/BSO\par \par Estrace cream for atrophy.\par \par Today, patient is doing well and has no complaints. Very happy with pessary.

## 2020-01-22 NOTE — DISCUSSION/SUMMARY
[FreeTextEntry1] : \par Stress Incontinence:\par Ring and Support with knob #3 removed, cleaned, inspected and reinserted. The patient tolerated this well. \par No bleeding, lesions, abnormal discharge were noted. \par The patient will follow up in 3 months for routine pessary management.\par \par Atrophic Vaginitis:\par Advised to continue to apply a pea size amount of the cream to the opening of the vagina three nights per week.

## 2020-01-23 DIAGNOSIS — N95.2 POSTMENOPAUSAL ATROPHIC VAGINITIS: ICD-10-CM

## 2020-01-23 DIAGNOSIS — N39.3 STRESS INCONTINENCE (FEMALE) (MALE): ICD-10-CM

## 2020-01-30 ENCOUNTER — APPOINTMENT (OUTPATIENT)
Dept: INTERNAL MEDICINE | Facility: CLINIC | Age: 55
End: 2020-01-30
Payer: MEDICAID

## 2020-01-30 ENCOUNTER — OUTPATIENT (OUTPATIENT)
Dept: OUTPATIENT SERVICES | Facility: HOSPITAL | Age: 55
LOS: 1 days | Discharge: HOME | End: 2020-01-30

## 2020-01-30 VITALS
HEIGHT: 62 IN | HEART RATE: 102 BPM | SYSTOLIC BLOOD PRESSURE: 146 MMHG | WEIGHT: 150 LBS | DIASTOLIC BLOOD PRESSURE: 82 MMHG | BODY MASS INDEX: 27.6 KG/M2

## 2020-01-30 PROCEDURE — 99213 OFFICE O/P EST LOW 20 MIN: CPT | Mod: GC

## 2020-01-30 NOTE — PHYSICAL EXAM
[Well Nourished] : well nourished [Normal Outer Ear/Nose] : the outer ears and nose were normal in appearance [Clear to Auscultation] : lungs were clear to auscultation bilaterally [Normal Rate] : normal rate  [Soft] : abdomen soft [Normal Bowel Sounds] : normal bowel sounds [Normal] : no rash

## 2020-01-30 NOTE — HISTORY OF PRESENT ILLNESS
[FreeTextEntry1] : came for follow up visit [de-identified] : 54F with epilepsy secondary brain mass/AVM (1986) s/p resection/craniotomy (2005) with breakthrough seizures, EVERETTE due to menorrhagia s/p hysterectomy, Asthma, DLD, pre diabetes and depression , urinary incontinence stress s/p pessary came for follow up.\par she denies any complaints, has left sided weakness, has on/off headaches but mild and chronic.\par she denies any chest pain, cough , no GI complaints.\par she underwent pessary , currently denies any urinary complaints.

## 2020-02-05 DIAGNOSIS — J45.909 UNSPECIFIED ASTHMA, UNCOMPLICATED: ICD-10-CM

## 2020-02-05 DIAGNOSIS — E78.5 HYPERLIPIDEMIA, UNSPECIFIED: ICD-10-CM

## 2020-02-05 DIAGNOSIS — N39.3 STRESS INCONTINENCE (FEMALE) (MALE): ICD-10-CM

## 2020-03-04 ENCOUNTER — APPOINTMENT (OUTPATIENT)
Dept: INTERNAL MEDICINE | Facility: CLINIC | Age: 55
End: 2020-03-04

## 2020-03-10 ENCOUNTER — APPOINTMENT (OUTPATIENT)
Dept: NEUROLOGY | Facility: CLINIC | Age: 55
End: 2020-03-10
Payer: MEDICAID

## 2020-03-10 ENCOUNTER — OUTPATIENT (OUTPATIENT)
Dept: OUTPATIENT SERVICES | Facility: HOSPITAL | Age: 55
LOS: 1 days | Discharge: HOME | End: 2020-03-10

## 2020-03-10 VITALS
DIASTOLIC BLOOD PRESSURE: 73 MMHG | BODY MASS INDEX: 27.42 KG/M2 | WEIGHT: 149 LBS | SYSTOLIC BLOOD PRESSURE: 105 MMHG | HEIGHT: 62 IN | HEART RATE: 89 BPM

## 2020-03-10 PROCEDURE — 99213 OFFICE O/P EST LOW 20 MIN: CPT | Mod: GC

## 2020-03-10 NOTE — HISTORY OF PRESENT ILLNESS
[FreeTextEntry1] : 54F with epilepsy secondary brain mass/AVM (1986) s/p resection/craniotomy (2005) with breakthrough seizures, EVERETTE due to menorrhagia s/p hysterectomy, Asthma, DLD, pre diabetes and depression here for follow up after 9/10/2019.  She has been doing well no new issues.  She does PT on tuesday and thursday.\par NO seizures

## 2020-03-10 NOTE — DISCUSSION/SUMMARY
[FreeTextEntry1] : Ms. Segal is 53yo F hx of seizures due to prior resection of brain mass/AVM with breakthrough, anemia, anxiety/depression. Currently stable\par 1. Continue current AEDs\par 2. Should do exercise and stretching regularly\par 3. f/u in 6 months. \par \par

## 2020-03-10 NOTE — PHYSICAL EXAM
[FreeTextEntry1] : Alert oriented to person place and date and knows the president\par Slight left facial\par LUE 5-/5 and LLE 4+/5\par increased tone on the left\par DTR 3++ L>R\par FTN NL\par Gait spastic

## 2020-05-13 ENCOUNTER — OUTPATIENT (OUTPATIENT)
Dept: OUTPATIENT SERVICES | Facility: HOSPITAL | Age: 55
LOS: 1 days | Discharge: HOME | End: 2020-05-13

## 2020-05-13 ENCOUNTER — APPOINTMENT (OUTPATIENT)
Dept: UROGYNECOLOGY | Facility: CLINIC | Age: 55
End: 2020-05-13
Payer: MEDICAID

## 2020-05-13 ENCOUNTER — APPOINTMENT (OUTPATIENT)
Dept: INTERNAL MEDICINE | Facility: CLINIC | Age: 55
End: 2020-05-13
Payer: MEDICAID

## 2020-05-13 VITALS — SYSTOLIC BLOOD PRESSURE: 100 MMHG | DIASTOLIC BLOOD PRESSURE: 60 MMHG | WEIGHT: 146 LBS | BODY MASS INDEX: 26.7 KG/M2

## 2020-05-13 VITALS
HEART RATE: 92 BPM | SYSTOLIC BLOOD PRESSURE: 108 MMHG | TEMPERATURE: 98.9 F | DIASTOLIC BLOOD PRESSURE: 55 MMHG | WEIGHT: 148 LBS | BODY MASS INDEX: 27.23 KG/M2 | HEIGHT: 62 IN

## 2020-05-13 DIAGNOSIS — Z86.79 PERSONAL HISTORY OF OTHER DISEASES OF THE CIRCULATORY SYSTEM: ICD-10-CM

## 2020-05-13 PROCEDURE — ZZZZZ: CPT

## 2020-05-13 PROCEDURE — 99214 OFFICE O/P EST MOD 30 MIN: CPT | Mod: GC

## 2020-05-13 NOTE — ASSESSMENT
[FreeTextEntry1] : 55yr old female with \par 1.seizure stable on current rx keep f/u with neuru\par 2.asthma stable \par 3. dyslipidemia last ldl 139\par 4.urinary incontinence s/p pessary f/u with urogyn\par rto in 6 months and prn\par  referral

## 2020-05-13 NOTE — HISTORY OF PRESENT ILLNESS
[de-identified] : 55yrold female withpmh/oseizure secondary to avm since 1986,asthma,prediabetes EVERETTE off iron,urinary incontinence s/p pessary [FreeTextEntry1] : no new complaints here to have [id number] form completed

## 2020-05-13 NOTE — DISCUSSION/SUMMARY
[FreeTextEntry1] : \par Stress Incontinence:\par Ring and Support with knob #3 removed, cleaned, inspected and reinserted. The patient tolerated this well. \par No bleeding, lesions, abnormal discharge were noted. \par The patient will follow up in 3 months for routine pessary management.\par \par Atrophic Vaginitis:\par Advised to continue to apply a pea size amount of the cream to the opening of the vagina three nights per week. 
For information on Fall & Injury Prevention, visit [email or web address]

## 2020-05-13 NOTE — HISTORY OF PRESENT ILLNESS
[FreeTextEntry1] : \par Patient is here for routine pessary cleaning for incomplete uterovaginal prolapse.\par Last seen 1/22/2020 for pessary cleaning. Ring and Support with knob #3\par \par H/o seizures\par \par s/p brain surgery for benign mass\par s/p LAVH/BSO\par \par Estrace cream for atrophy.\par \par Today, patient is doing well and has no complaints.

## 2020-05-19 LAB
25(OH)D3 SERPL-MCNC: 15 NG/ML
ALBUMIN SERPL ELPH-MCNC: 4.4 G/DL
ALP BLD-CCNC: 88 U/L
ALT SERPL-CCNC: 15 U/L
ANION GAP SERPL CALC-SCNC: 16 MMOL/L
AST SERPL-CCNC: 13 U/L
BASOPHILS # BLD AUTO: 0.03 K/UL
BASOPHILS NFR BLD AUTO: 0.4 %
BILIRUB SERPL-MCNC: <0.2 MG/DL
BUN SERPL-MCNC: 20 MG/DL
CALCIUM SERPL-MCNC: 9.8 MG/DL
CHLORIDE SERPL-SCNC: 101 MMOL/L
CHOLEST SERPL-MCNC: 219 MG/DL
CHOLEST/HDLC SERPL: 4.1 RATIO
CO2 SERPL-SCNC: 24 MMOL/L
CREAT SERPL-MCNC: 0.6 MG/DL
CREAT SPEC-SCNC: 206 MG/DL
EOSINOPHIL # BLD AUTO: 0.09 K/UL
EOSINOPHIL NFR BLD AUTO: 1.3 %
ESTIMATED AVERAGE GLUCOSE: 126 MG/DL
GLUCOSE SERPL-MCNC: 112 MG/DL
HBA1C MFR BLD HPLC: 6 %
HCT VFR BLD CALC: 36.9 %
HDLC SERPL-MCNC: 54 MG/DL
HGB BLD-MCNC: 12.2 G/DL
IMM GRANULOCYTES NFR BLD AUTO: 0.1 %
LDLC SERPL CALC-MCNC: 126 MG/DL
LYMPHOCYTES # BLD AUTO: 4.07 K/UL
LYMPHOCYTES NFR BLD AUTO: 60.7 %
MAN DIFF?: NORMAL
MCHC RBC-ENTMCNC: 32.4 PG
MCHC RBC-ENTMCNC: 33.1 G/DL
MCV RBC AUTO: 97.9 FL
MICROALBUMIN 24H UR DL<=1MG/L-MCNC: 1.3 MG/DL
MICROALBUMIN/CREAT 24H UR-RTO: 6 MG/G
MONOCYTES # BLD AUTO: 0.81 K/UL
MONOCYTES NFR BLD AUTO: 12.1 %
NEUTROPHILS # BLD AUTO: 1.7 K/UL
NEUTROPHILS NFR BLD AUTO: 25.4 %
PLATELET # BLD AUTO: 198 K/UL
POTASSIUM SERPL-SCNC: 4.4 MMOL/L
PROT SERPL-MCNC: 7.1 G/DL
RBC # BLD: 3.77 M/UL
RBC # FLD: 13.7 %
SODIUM SERPL-SCNC: 141 MMOL/L
TRIGL SERPL-MCNC: 238 MG/DL
TSH SERPL-ACNC: 3.67 UIU/ML
WBC # FLD AUTO: 6.71 K/UL

## 2020-05-27 DIAGNOSIS — R73.03 PREDIABETES: ICD-10-CM

## 2020-05-27 DIAGNOSIS — J45.909 UNSPECIFIED ASTHMA, UNCOMPLICATED: ICD-10-CM

## 2020-05-27 DIAGNOSIS — E78.5 HYPERLIPIDEMIA, UNSPECIFIED: ICD-10-CM

## 2020-05-27 DIAGNOSIS — Z86.011 PERSONAL HISTORY OF BENIGN NEOPLASM OF THE BRAIN: ICD-10-CM

## 2020-05-27 DIAGNOSIS — N39.3 STRESS INCONTINENCE (FEMALE) (MALE): ICD-10-CM

## 2020-05-27 DIAGNOSIS — G40.909 EPILEPSY, UNSPECIFIED, NOT INTRACTABLE, WITHOUT STATUS EPILEPTICUS: ICD-10-CM

## 2020-05-27 DIAGNOSIS — Z86.79 PERSONAL HISTORY OF OTHER DISEASES OF THE CIRCULATORY SYSTEM: ICD-10-CM

## 2020-06-11 NOTE — HISTORY OF PRESENT ILLNESS
[de-identified] : 54F with EVERETTE 2/2 menorrhagia s/p hysterectomy, Asthma, DLD, pre diabetes , epilepsy 2/2 brain mass/AVM s/p resection craniotomy with breakthrough seizures, and depression here for follow up after 2/19. Pt states she had 3 seizures this month. Seizures present as weakness on the left side with jerking motion. They last for around 10 minutes then calms down and after this has dizziness for another 4 minutes. Oxcarbazepine 300mg TID, Keppra 1500mg BID, and Depakote 500mg BID and evening 125mg. Patient has appointment to see neurology tomorrow. Today she has no complaints, need medications renewal and wants to know about her lab work. She is going 2 times a week to get PT for weakness on the left side and feeling better. \par  
no

## 2020-07-01 ENCOUNTER — TRANSCRIPTION ENCOUNTER (OUTPATIENT)
Age: 55
End: 2020-07-01

## 2020-08-12 ENCOUNTER — APPOINTMENT (OUTPATIENT)
Dept: UROGYNECOLOGY | Facility: CLINIC | Age: 55
End: 2020-08-12
Payer: MEDICAID

## 2020-08-12 ENCOUNTER — OUTPATIENT (OUTPATIENT)
Dept: OUTPATIENT SERVICES | Facility: HOSPITAL | Age: 55
LOS: 1 days | Discharge: HOME | End: 2020-08-12

## 2020-08-12 VITALS — SYSTOLIC BLOOD PRESSURE: 112 MMHG | DIASTOLIC BLOOD PRESSURE: 78 MMHG | HEIGHT: 62 IN

## 2020-08-12 PROCEDURE — ZZZZZ: CPT

## 2020-08-12 NOTE — COUNSELING
[FreeTextEntry1] : Please call the office if you have any issues with vaginal pain, vaginal bleeding, difficulty urinating or having a bowel movement or if the pessary falls out so that we can arrange another size pessary.\par \par Please continue to apply a pea size amount of the cream to the opening of the vagina three nights per week.\par \par Schedule bladder function testing (UDS without reduction) with CHIDI Art. \par \par Please call the office if you feel like you have an infection because we can not do the bladder function testing in the setting of an infection.\par \par Please come with a full, not painful bladder.\par \par Please follow up for pessary maintenance in 3 months with CHIDI Art\par

## 2020-08-12 NOTE — HISTORY OF PRESENT ILLNESS
[FreeTextEntry1] : Patient is here for routine pessary cleaning for GERALDO.\par Last seen 5/13/2020 for pessary cleaning. ring and support with knob #3\par \par H/o seizures\par \par s/p brain surgery for benign mass\par s/p LAVH/BSO\par \par Estrace cream for atrophy.\par Today, patient is doing well.  Pt states that over the last month she has experienced leaking with laughing and is now interested in proceeding with the surgery. \par

## 2020-08-12 NOTE — DISCUSSION/SUMMARY
[FreeTextEntry1] : Stress urinary incontinence\par Ring and support with knob #4 removed, cleaned, inspected and reinserted. The patient tolerated this well. \par Bleeding noted. Hemostasis obtained with silver nitrate.\par \par Pt will schedule UDS without reduction to prepare for the surgery.\par \par The patient will follow up in 3 months for routine pessary management.\par \par Atrophic Vaginitis:\par Advised to continue to apply a pea size amount of the cream to the opening of the vagina three nights per week.\par

## 2020-08-24 ENCOUNTER — OUTPATIENT (OUTPATIENT)
Dept: OUTPATIENT SERVICES | Facility: HOSPITAL | Age: 55
LOS: 1 days | Discharge: HOME | End: 2020-08-24

## 2020-08-24 ENCOUNTER — APPOINTMENT (OUTPATIENT)
Dept: INTERNAL MEDICINE | Facility: CLINIC | Age: 55
End: 2020-08-24
Payer: MEDICAID

## 2020-08-24 VITALS
SYSTOLIC BLOOD PRESSURE: 102 MMHG | WEIGHT: 148 LBS | HEIGHT: 62 IN | DIASTOLIC BLOOD PRESSURE: 72 MMHG | BODY MASS INDEX: 27.23 KG/M2 | HEART RATE: 96 BPM

## 2020-08-24 PROCEDURE — 99214 OFFICE O/P EST MOD 30 MIN: CPT | Mod: GC

## 2020-08-24 NOTE — ASSESSMENT
[FreeTextEntry1] : 55 y.o lady with pmh with epilepsy secondary to AVM s/p resection, maintained on 3 AED, EVERETTE, stress urinary incontinence, DLD, asthma came in to fill a home care renewal form\par she denies any asthma symptoms, \par she mentions that last seizure episode was 3 days ago. 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) s/p resection/craniotomy (2005) with breakthrough seizures, EVERETTE due to menorrhagia s/p hysterectomy, Asthma, DLD, pre diabetes and depression here for follow up \par \par # asthma\par  well controlled\par  no wheezing\par  c/w same bronchodilators\par \par # prediabetes last hba1c 6\par  on dietary control\par \par # DLD\par  last ldl 126\par  \par  low cardiovascular risk\par  educated on diet control\par \par # brain mass and AVM s/p resection\par  h/o of seizure\par  on anti seizure medication\par  follows with neurologist dr steven montgomery\par \par # h/o of stress incontinence\par  follow with gynecologist\par  s/p pessary\par \par # screening\par  mammogram last 2020 negative\par  pap smear uptodate / f/u with GYN \par  colonoscopy: patient claims that she did a colonoscopy in 2018 and it was normal, but i was unable to find the official report\par \par # vaccination\par  flu shot next visit \par \par # HCM:\par f/u with GI\par follow up in 6 months and PRN.

## 2020-08-24 NOTE — HISTORY OF PRESENT ILLNESS
[FreeTextEntry1] : follow up  [de-identified] : 55yrold female withpmh/oseizure secondary to avm since 1986,asthma,prediabetes EVERETTE off iron,urinary incontinence s/p pessary by Uro gyn last visit came in to fill a request for home care renewal \par she denies any symptoms and she mentions that last seizures episode was 3 days ago\par has an appointment with neurology in 2 weeks\par

## 2020-08-24 NOTE — PHYSICAL EXAM
[Normal] : soft, non-tender, non-distended, no masses palpated, no HSM and normal bowel sounds [Coordination Grossly Intact] : coordination grossly intact [No Focal Deficits] : no focal deficits [Alert and Oriented x3] : oriented to person, place, and time [Speech Grossly Normal] : speech grossly normal

## 2020-08-28 DIAGNOSIS — F32.9 MAJOR DEPRESSIVE DISORDER, SINGLE EPISODE, UNSPECIFIED: ICD-10-CM

## 2020-08-28 DIAGNOSIS — G40.909 EPILEPSY, UNSPECIFIED, NOT INTRACTABLE, WITHOUT STATUS EPILEPTICUS: ICD-10-CM

## 2020-08-28 DIAGNOSIS — R73.03 PREDIABETES: ICD-10-CM

## 2020-08-28 DIAGNOSIS — E78.5 HYPERLIPIDEMIA, UNSPECIFIED: ICD-10-CM

## 2020-09-08 ENCOUNTER — APPOINTMENT (OUTPATIENT)
Dept: NEUROLOGY | Facility: CLINIC | Age: 55
End: 2020-09-08
Payer: MEDICAID

## 2020-09-08 ENCOUNTER — OUTPATIENT (OUTPATIENT)
Dept: OUTPATIENT SERVICES | Facility: HOSPITAL | Age: 55
LOS: 1 days | Discharge: HOME | End: 2020-09-08

## 2020-09-08 VITALS
WEIGHT: 148 LBS | DIASTOLIC BLOOD PRESSURE: 81 MMHG | BODY MASS INDEX: 27.23 KG/M2 | HEIGHT: 62 IN | HEART RATE: 93 BPM | SYSTOLIC BLOOD PRESSURE: 124 MMHG

## 2020-09-08 DIAGNOSIS — G40.909 EPILEPSY, UNSPECIFIED, NOT INTRACTABLE, WITHOUT STATUS EPILEPTICUS: ICD-10-CM

## 2020-09-08 PROCEDURE — 99213 OFFICE O/P EST LOW 20 MIN: CPT | Mod: GC

## 2020-09-08 NOTE — HISTORY OF PRESENT ILLNESS
[FreeTextEntry1] : PATIENT IS A 54 YO FEMALE WHO PRESENTS FOR FOLLOW UP\par Patient with epilepsy 2.2 AVM W/ resection in 2005\par also with hx of dld and asthma\par she has had 2 sz in the last 3 weeks. as per patient and family there was likely missed dosages. both with L sided convulsions.\par she continues to have balance issues due to LLE weakness\par she feels that her short term memory is poor \par she takes vpa 500 - 500 - 250 mg\par keppra 1500 mg q 12 hrs\par trileptal 300 q 8 hrs

## 2020-09-08 NOTE — PHYSICAL EXAM
[FreeTextEntry1] : Neurological Exam: \par Mental status: Awake, alert and oriented x3.  Recent and remote memory intact.  Naming, repetition and comprehension intact.  Attention/concentration intact.  No dysarthria, no aphasia.  Fund of knowledge appropriate.  \par Cranial nerves: Pupils equally round and reactive to light, visual fields full, no nystagmus, extraocular muscles intact, V1 through V3 intact bilaterally and symmetric, face symmetric, hearing intact to finger rub, palate elevation symmetric, tongue was midline.\par Motor:  residual mild LHP (baseline)\par Sensation: Intact to light touch, proprioception, and pinprick. \par Coordination: No dysmetria on finger-to-nose and heel-to-shin.  No dysdiadokinesia.\par Reflexes: 2+ in bilateral UE/LE, downgoing toes bilaterally. (-) Russ.\par Gait: hemiparetic\par \par

## 2020-09-08 NOTE — ASSESSMENT
[FreeTextEntry1] : Ms. Segal is 53yo F hx of seizures due to prior resection of brain mass/AVM with breakthrough, anemia, anxiety/depression. Currently stable\par \par  Continue current AED regimen as per hpi\par check cmp, cbc and all drug levels\par  f/u in 6 months. \par

## 2020-09-22 LAB
ALBUMIN SERPL ELPH-MCNC: 4.4 G/DL
ALP BLD-CCNC: 86 U/L
ALT SERPL-CCNC: 20 U/L
ANION GAP SERPL CALC-SCNC: 11 MMOL/L
AST SERPL-CCNC: 16 U/L
BASOPHILS # BLD AUTO: 0.02 K/UL
BASOPHILS NFR BLD AUTO: 0.3 %
BILIRUB SERPL-MCNC: 0.2 MG/DL
BUN SERPL-MCNC: 20 MG/DL
CALCIUM SERPL-MCNC: 9.8 MG/DL
CHLORIDE SERPL-SCNC: 102 MMOL/L
CO2 SERPL-SCNC: 27 MMOL/L
CREAT SERPL-MCNC: 0.6 MG/DL
EOSINOPHIL # BLD AUTO: 0.04 K/UL
EOSINOPHIL NFR BLD AUTO: 0.7 %
GLUCOSE SERPL-MCNC: 113 MG/DL
HCT VFR BLD CALC: 38.2 %
HGB BLD-MCNC: 12.2 G/DL
IMM GRANULOCYTES NFR BLD AUTO: 0.5 %
LYMPHOCYTES # BLD AUTO: 3.26 K/UL
LYMPHOCYTES NFR BLD AUTO: 53.4 %
MAN DIFF?: NORMAL
MCHC RBC-ENTMCNC: 31 PG
MCHC RBC-ENTMCNC: 31.9 G/DL
MCV RBC AUTO: 97.2 FL
MONOCYTES # BLD AUTO: 0.77 K/UL
MONOCYTES NFR BLD AUTO: 12.6 %
NEUTROPHILS # BLD AUTO: 1.99 K/UL
NEUTROPHILS NFR BLD AUTO: 32.5 %
PLATELET # BLD AUTO: 201 K/UL
POTASSIUM SERPL-SCNC: 4.4 MMOL/L
PROT SERPL-MCNC: 7.4 G/DL
RBC # BLD: 3.93 M/UL
RBC # FLD: 13.7 %
SODIUM SERPL-SCNC: 140 MMOL/L
WBC # FLD AUTO: 6.11 K/UL

## 2020-09-23 LAB — VALPROATE SERPL-MCNC: 88 UG/ML

## 2020-09-24 LAB — LEVETIRACETAM SERPL-MCNC: 66.3 MCG/ML

## 2020-09-25 LAB — OXCARBAZEPINE SERPL-MCNC: 19 UG/ML

## 2020-10-02 ENCOUNTER — OUTPATIENT (OUTPATIENT)
Dept: OUTPATIENT SERVICES | Facility: HOSPITAL | Age: 55
LOS: 1 days | Discharge: HOME | End: 2020-10-02

## 2020-10-02 ENCOUNTER — APPOINTMENT (OUTPATIENT)
Dept: GASTROENTEROLOGY | Facility: CLINIC | Age: 55
End: 2020-10-02
Payer: MEDICAID

## 2020-10-02 VITALS
HEIGHT: 62 IN | HEART RATE: 91 BPM | SYSTOLIC BLOOD PRESSURE: 104 MMHG | DIASTOLIC BLOOD PRESSURE: 71 MMHG | BODY MASS INDEX: 27.23 KG/M2 | TEMPERATURE: 97.2 F | WEIGHT: 148 LBS

## 2020-10-02 PROCEDURE — 99201 OFFICE OUTPATIENT NEW 10 MINUTES: CPT

## 2020-10-02 NOTE — PHYSICAL EXAM
[General Appearance - Alert] : alert [General Appearance - In No Acute Distress] : in no acute distress [Sclera] : the sclera and conjunctiva were normal [Outer Ear] : the ears and nose were normal in appearance [] : no respiratory distress [Heart Sounds] : normal S1 and S2 [Arterial Pulses Carotid] : carotid pulses were normal with no bruits [Bowel Sounds] : normal bowel sounds [Abdomen Soft] : soft [No CVA Tenderness] : no ~M costovertebral angle tenderness [Abnormal Walk] : normal gait

## 2020-10-02 NOTE — HISTORY OF PRESENT ILLNESS
[Heartburn] : denies heartburn [Nausea] : denies nausea [Vomiting] : denies vomiting [Diarrhea] : denies diarrhea [Constipation] : denies constipation [Yellow Skin Or Eyes (Jaundice)] : denies jaundice [Abdominal Pain] : denies abdominal pain [Abdominal Swelling] : denies abdominal swelling [de-identified] : 54 Y/O F with PMH of Asthma and Seizure was sent by the PMD as the reports for the Colonoscopy done in 2014 was not found. According to the patient, Colonoscopy was done in 2014.\par \par Reports were found on [facility] [employer] she had Colonoscopy in 2014, which was good prep and \par it was normal.\par \par The patient denies rectal bleeding, melena, diarrhea, constipation, change in bowel habits, change in stool caliber, weight loss,change in appetite, nausea, vomiting, difficulty swallowing, early satiety, abdominal pain, fever or chills.\par

## 2020-10-02 NOTE — ASSESSMENT
[FreeTextEntry1] : 56 Y/O F with PMH of Asthma and Seizure was sent by the PMD as the reports for the Colonoscopy done in 2014 was not found. According to the patient, Colonoscopy was done in 2014.\par \par Reports were found on [employer] she had Colonoscopy in 2014, which was good prep and \par it was normal.\par \par She does not have abdominal pain, nausea, vomiting, diarrhea or any family history of any cancer.\par \par \par PLAN:\par -Follow up in 2024 for screening Colonoscopy.

## 2020-10-21 ENCOUNTER — RESULT CHARGE (OUTPATIENT)
Age: 55
End: 2020-10-21

## 2020-10-21 ENCOUNTER — OUTPATIENT (OUTPATIENT)
Dept: OUTPATIENT SERVICES | Facility: HOSPITAL | Age: 55
LOS: 1 days | Discharge: HOME | End: 2020-10-21

## 2020-10-21 ENCOUNTER — APPOINTMENT (OUTPATIENT)
Dept: UROGYNECOLOGY | Facility: CLINIC | Age: 55
End: 2020-10-21
Payer: MEDICAID

## 2020-10-21 VITALS — WEIGHT: 148 LBS | BODY MASS INDEX: 27.07 KG/M2 | DIASTOLIC BLOOD PRESSURE: 70 MMHG | SYSTOLIC BLOOD PRESSURE: 120 MMHG

## 2020-10-21 LAB
BILIRUB UR QL STRIP: NORMAL
CLARITY UR: CLEAR
COLLECTION METHOD: NORMAL
GLUCOSE UR-MCNC: NORMAL
HCG UR QL: 0.2 EU/DL
HGB UR QL STRIP.AUTO: NORMAL
KETONES UR-MCNC: NORMAL
LEUKOCYTE ESTERASE UR QL STRIP: NORMAL
NITRITE UR QL STRIP: NORMAL
PH UR STRIP: 7.5
PROT UR STRIP-MCNC: NORMAL
SP GR UR STRIP: 1.01

## 2020-10-21 PROCEDURE — 51797 INTRAABDOMINAL PRESSURE TEST: CPT | Mod: 26

## 2020-10-21 PROCEDURE — 51741 ELECTRO-UROFLOWMETRY FIRST: CPT | Mod: 26

## 2020-10-21 PROCEDURE — 51728 CYSTOMETROGRAM W/VP: CPT | Mod: 26

## 2020-10-21 PROCEDURE — 51784 ANAL/URINARY MUSCLE STUDY: CPT | Mod: 26

## 2020-11-04 ENCOUNTER — APPOINTMENT (OUTPATIENT)
Dept: UROGYNECOLOGY | Facility: CLINIC | Age: 55
End: 2020-11-04

## 2020-11-11 ENCOUNTER — OUTPATIENT (OUTPATIENT)
Dept: OUTPATIENT SERVICES | Facility: HOSPITAL | Age: 55
LOS: 1 days | Discharge: HOME | End: 2020-11-11

## 2020-11-11 ENCOUNTER — APPOINTMENT (OUTPATIENT)
Dept: UROGYNECOLOGY | Facility: CLINIC | Age: 55
End: 2020-11-11
Payer: MEDICAID

## 2020-11-11 VITALS — DIASTOLIC BLOOD PRESSURE: 70 MMHG | SYSTOLIC BLOOD PRESSURE: 118 MMHG

## 2020-11-11 DIAGNOSIS — Z12.11 ENCOUNTER FOR SCREENING FOR MALIGNANT NEOPLASM OF COLON: ICD-10-CM

## 2020-11-11 PROCEDURE — 99215 OFFICE O/P EST HI 40 MIN: CPT

## 2020-11-12 ENCOUNTER — APPOINTMENT (OUTPATIENT)
Dept: INTERNAL MEDICINE | Facility: CLINIC | Age: 55
End: 2020-11-12
Payer: MEDICAID

## 2020-11-12 ENCOUNTER — OUTPATIENT (OUTPATIENT)
Dept: OUTPATIENT SERVICES | Facility: HOSPITAL | Age: 55
LOS: 1 days | Discharge: HOME | End: 2020-11-12

## 2020-11-12 VITALS
DIASTOLIC BLOOD PRESSURE: 80 MMHG | WEIGHT: 150.2 LBS | BODY MASS INDEX: 27.64 KG/M2 | TEMPERATURE: 98.7 F | HEIGHT: 62 IN | SYSTOLIC BLOOD PRESSURE: 120 MMHG | HEART RATE: 94 BPM | OXYGEN SATURATION: 94 %

## 2020-11-12 PROCEDURE — 99213 OFFICE O/P EST LOW 20 MIN: CPT | Mod: GC

## 2020-11-12 NOTE — HISTORY OF PRESENT ILLNESS
[FreeTextEntry1] : follow up  [de-identified] : 55 yrold female withpmh/oseizure secondary to avm since 1986,asthma,prediabetes EVERETTE off iron,urinary incontinence s/p pessary by Uro gyn here for preop papers for uterine sling placement in january with Dr. Pride. She states that she had two seizures around 3 weeks ago, witness by sister, did not go to hospital. Patient states that she got the flu shot about one month ago.\par

## 2020-11-12 NOTE — PHYSICAL EXAM
[Normal] : soft, non-tender, non-distended, no masses palpated, no HSM and normal bowel sounds [Coordination Grossly Intact] : coordination grossly intact [No Focal Deficits] : no focal deficits [Speech Grossly Normal] : speech grossly normal [Alert and Oriented x3] : oriented to person, place, and time [No Acute Distress] : no acute distress [Well Nourished] : well nourished [Well Developed] : well developed [Well-Appearing] : well-appearing [Normal Sclera/Conjunctiva] : normal sclera/conjunctiva [Normal Outer Ear/Nose] : the outer ears and nose were normal in appearance [No Lymphadenopathy] : no lymphadenopathy [No Respiratory Distress] : no respiratory distress  [No Accessory Muscle Use] : no accessory muscle use [Clear to Auscultation] : lungs were clear to auscultation bilaterally [Normal Rate] : normal rate  [Regular Rhythm] : with a regular rhythm [Normal S1, S2] : normal S1 and S2 [Soft] : abdomen soft [Non Tender] : non-tender [Non-distended] : non-distended [No HSM] : no HSM [Normal Bowel Sounds] : normal bowel sounds [No Rash] : no rash

## 2020-11-12 NOTE — ASSESSMENT
[FreeTextEntry1] : 55 yrold female withpmh/oseizure secondary to avm since 1986,asthma,prediabetes EVERETTE off iron,urinary incontinence s/p pessary by Uro gyn here for preop papers for uterine sling placement in january with Dr. Pride.\par \par # asthma\par  well controlled\par  no wheezing\par  c/w same bronchodilators\par \par # prediabetes last hba1c 6\par  on dietary control\par \par # DLD\par  last ldl 126\par  \par  low cardiovascular risk\par  educated on diet control\par \par # brain mass and AVM s/p resection\par  h/o of seizure\par  on anti seizure medication\par  follows with neurologist dr steven montgomery\par \par # h/o of stress incontinence\par  follow with gynecologist\par  s/p pessary\par sling placement planned for jan 2021\par \par # screening\par  mammogram last 2020 negative\par  pap smear uptodate / f/u with GYN \par  colonoscopy: patient claims that she did a colonoscopy in 2018 and it was normal, but i was unable to find the official report\par \par # vaccination\par  flu shot on october 5th 2020\par \par # HCM:\par f/u with GI\par follow up in after dec 15 for clearance or PRN\par rto after past

## 2020-11-13 DIAGNOSIS — N95.2 POSTMENOPAUSAL ATROPHIC VAGINITIS: ICD-10-CM

## 2020-11-13 DIAGNOSIS — G40.909 EPILEPSY, UNSPECIFIED, NOT INTRACTABLE, WITHOUT STATUS EPILEPTICUS: ICD-10-CM

## 2020-11-13 DIAGNOSIS — R73.03 PREDIABETES: ICD-10-CM

## 2020-11-13 DIAGNOSIS — N39.3 STRESS INCONTINENCE (FEMALE) (MALE): ICD-10-CM

## 2020-11-15 NOTE — HISTORY OF PRESENT ILLNESS
[FreeTextEntry1] : \par The patient is here for surgical counseling for stress incontinence\par s/p pessary management-still had stress incontinence\par \par 10/21/20: urodynamics:\par sensitive bladder, no USUI, no obstructive voiding\par Plan: pyridium pad test\par \par Patient brought in pads with orange urine on them and said it was when she was pulling on the dog's leash when she was taking the dog for a walk\par \par H/o seizures\par s/p brain surgery for benign mass\par \par Candidate for a retropubic sling/cystoscopy

## 2020-11-15 NOTE — DISCUSSION/SUMMARY
[FreeTextEntry1] : \par Stress incontinence-\par The surgical procedure of an exam under anesthesia/retropubic midurethral sling/cystoscopy was reviewed. The patient was advised that the surgery does not improve urge incontinence and can worsen those symptoms. The postoperative restrictions were reviewed. All of the patient's questions and concerns were answered. The interpretation of the urodynamics was reviewed. The risks and benefits and alternatives of the above procedures were reviewed and informed consent was signed. The patient will be scheduled for surgery, preop lab testing and preop medical eval.\par \par

## 2020-11-15 NOTE — COUNSELING
[FreeTextEntry1] : \par Please call with any questions or concerns\par \par The hospital will call you to schedule preoperative testing at the hospital\par \par In the mean time, please call your primary doctor and your neurologist and explain you are having surgery on Jan 5th and you need them to fill out paperwork to make sure that you are as healthy as possible. Ask them for a copy of their report so that you can bring it to the preoperative testing at the hospital\par \par Happy Holidays!\par \par See you on Jan 5th!

## 2020-12-03 ENCOUNTER — OUTPATIENT (OUTPATIENT)
Dept: OUTPATIENT SERVICES | Facility: HOSPITAL | Age: 55
LOS: 1 days | Discharge: HOME | End: 2020-12-03

## 2020-12-03 ENCOUNTER — APPOINTMENT (OUTPATIENT)
Dept: OBGYN | Facility: CLINIC | Age: 55
End: 2020-12-03
Payer: MEDICAID

## 2020-12-03 VITALS
HEIGHT: 62 IN | SYSTOLIC BLOOD PRESSURE: 110 MMHG | BODY MASS INDEX: 26.69 KG/M2 | DIASTOLIC BLOOD PRESSURE: 70 MMHG | WEIGHT: 145.06 LBS

## 2020-12-03 PROCEDURE — 99396 PREV VISIT EST AGE 40-64: CPT

## 2020-12-03 NOTE — PLAN
[FreeTextEntry1] : Routine gyn exam. Pessary removed, cleaned and reinserted. Mammo referral given. Primary care with pmd. Pt to rv in 1 year or prn

## 2020-12-03 NOTE — PHYSICAL EXAM
[Appropriately responsive] : appropriately responsive [Soft] : soft [Non-tender] : non-tender [Non-distended] : non-distended [Oriented x3] : oriented x3 [Examination Of The Breasts] : a normal appearance [No Masses] : no breast masses were palpable [Labia Majora] : normal [Labia Minora] : normal [Normal] : normal [Absent] : absent [Uterine Adnexae] : absent [No Tenderness] : no tenderness [Nl Sphincter Tone] : normal sphincter tone

## 2020-12-07 NOTE — ASSESSMENT
12/7/2020    Neri Puri MD    No chief complaint on file.      HPI:    49 y/o male w/ h/o chronic systolic heart failure, HTN, DL, and CKD presenting to clinic for care.     Pt was diagnosed with heart failure ~1.5-2 years ago (~2018/2019).  Was told it was seen on a test/study. Diagnosed ~3 weeks after a severe cold.       Pt thinks he may have had Coronavirus earlier in 2020 and was hospitlzed - this was ~March so no clear testing    No hospitalizations for HF in 2019.      Currently patient states he feels good.  Denies any limitation secondary to SOB/RAMIREZ.  No LE edema.  Appetite intact.  Feels he got a lot better after starting entresto.  Has been on entresto for 1-2 months.     Denies N/V.  Appetite varies.  Seems to have early satiety.  No LE edema.  Usually carries his fluid in his belly.  Has been feeling somewhat bloating.      Weight has been stable.      SHx:  Tob - quit 2017 - 1/2 ppd prior  EtOH - 1-2 beers every other day  Illicits - none     FHx:  Mother - DM, ESRD on HD  Father - bad heart - heart attack in 60s.    No FHX of SCD.      Relevant Past Medical History:  Past Medical History:   Diagnosis Date   • Chronic kidney disease    • Congestive cardiac failure (CMS/HCC)    • Hyperlipoproteinemia    • Hypertension    • Obesity (BMI 30.0-34.9)        Patient Active Problem List   Diagnosis   • Abnormal cardiovascular function study   • Chronic systolic congestive heart failure (CMS/HCC)   • Hypertensive heart disease with chronic systolic congestive heart failure (CMS/HCC)   • Pure hypercholesterolemia   • At risk for sudden cardiac death   • Nonischemic dilated cardiomyopathy (CMS/HCC)   • Chronic combined systolic and diastolic congestive heart failure (CMS/HCC)   • IVCD (intraventricular conduction defect)   • Abdominal bloating   • Flu vaccine need   • Epistaxis   • Chronic rhinitis   • S/P ICD (internal cardiac defibrillator) procedure   • Obesity (BMI 30.0-34.9)   • ICD (implantable  [FreeTextEntry1] : 54F with epilepsy secondary brain mass/AVM (1986) s/p resection/craniotomy (2005) with breakthrough seizures, EVERETTE due to menorrhagia s/p hysterectomy, Asthma, DLD, pre diabetes and depression here for follow up \par \par # asthma\par    well controlled\par    no wheezing\par \par # prediabetes last hba1c 5.9\par    on dietary control\par    will recheck hba1c\par \par # DLD\par    last ldl 139\par    low cardiovascular risk\par    educated on diet control\par \par # brain mass and AVM s/p resection\par    h/o of seizure\par    on anti seizure medication\par    follows with neurologist\par \par # h/o of stress incontinence\par    follow with gynecologist\par    s/p pessary\par \par # screening\par    mammogram last 2020 negative\par    pap smear uptodate\par \par # vaccination\par    flu shot given last visit\par \par # follow up in 6 months cardioverter-defibrillator) in place   • Seborrheic dermatitis   • Central sleep apnea   • Retching   • Screening for prostate cancer       Review of Systems   Constitutional: Negative for activity change, appetite change and unexpected weight change.   HENT: Negative for congestion, rhinorrhea and sore throat.    Eyes: Negative for pain.   Respiratory: Negative for apnea, chest tightness and stridor.    Cardiovascular: Negative.    Gastrointestinal: Negative for blood in stool, constipation, diarrhea, nausea and vomiting.   Endocrine: Negative for cold intolerance and heat intolerance.   Genitourinary: Negative for decreased urine volume and difficulty urinating.   Musculoskeletal: Negative for arthralgias and myalgias.   Skin: Negative for color change and pallor.   Neurological: Negative for tremors, speech difficulty, weakness, numbness and headaches.       ALLERGIES:  No Known Allergies    Past Surgical History:  Past Surgical History:   Procedure Laterality Date   • Icd implant         Current Medications:   Current Outpatient Medications   Medication Sig Dispense Refill   • carvedilol (COREG) 12.5 MG tablet TAKE 1 TABLET BY MOUTH TWICE A DAY WITH MEALS 180 tablet 1   • sacubitril-valsartan (ENTRESTO) 24-26 MG per tablet Take 1 tablet by mouth 2 times daily. 60 tablet 11   • torsemide (DEMADEX) 20 MG tablet TAKE 1 TABLET BY MOUTH TWICE A  tablet 3   • atorvastatin (LIPITOR) 10 MG tablet Take 1 tablet by mouth daily. 90 tablet 3   • aspirin 81 MG EC tablet Take 1 tablet by mouth daily. 90 tablet 3   • Multiple Vitamins-Minerals (MULTIVITAMIN ADULT) Tab Take 1 tablet by mouth daily.     • spironolactone (ALDACTONE) 25 MG tablet Take 0.5 tablets by mouth daily. 45 tablet 2     No current facility-administered medications for this visit.        Social History     Socioeconomic History   • Marital status: /Civil Union     Spouse name: Not on file   • Number of children: Not on file   • Years of  education: Not on file   • Highest education level: Not on file   Occupational History   • Not on file   Social Needs   • Financial resource strain: Not hard at all   • Food insecurity     Worry: Never true     Inability: Never true   • Transportation needs     Medical: No     Non-medical: No   Tobacco Use   • Smoking status: Former Smoker     Packs/day: 0.00     Types: Cigarettes     Quit date: 3/2/2018     Years since quittin.7   • Smokeless tobacco: Never Used   Substance and Sexual Activity   • Alcohol use: Not Currently     Alcohol/week: 1.0 standard drinks     Types: 1 Cans of beer per week     Frequency: 2-3 times a week     Drinks per session: 1 or 2     Comment: social   • Drug use: No   • Sexual activity: Not on file   Lifestyle   • Physical activity     Days per week: 3 days     Minutes per session: 10 min   • Stress: Not at all   Relationships   • Social connections     Talks on phone: Not on file     Gets together: Not on file     Attends Buddhism service: Not on file     Active member of club or organization: Not on file     Attends meetings of clubs or organizations: Not on file     Relationship status: Not on file   • Intimate partner violence     Fear of current or ex partner: Not on file     Emotionally abused: Not on file     Physically abused: Not on file     Forced sexual activity: Not on file   Other Topics Concern   • Not on file   Social History Narrative   • Not on file       Family History:  family history includes Diabetes in his mother; Heart disease in his father and mother; Liver Disease in his father.    Examination:   There were no vitals taken for this visit.  There were no vitals filed for this visit.    Physical Exam   Constitutional: He is oriented to person, place, and time. He appears well-developed and well-nourished.   HENT:   Head: Normocephalic.   Eyes: Pupils are equal, round, and reactive to light. Conjunctivae and EOM are normal.   Neck: Normal range of motion. No  JVD present.   Cardiovascular: Normal rate, regular rhythm, normal heart sounds and intact distal pulses.   Pulmonary/Chest: Effort normal and breath sounds normal.   Abdominal: Soft. Bowel sounds are normal.   Musculoskeletal: Normal range of motion.         General: No edema.   Neurological: He is alert and oriented to person, place, and time.   Skin: Skin is warm and dry.   Psychiatric: He has a normal mood and affect. His behavior is normal.   Nursing note and vitals reviewed.      Diagnostic data:  Sodium (mmol/L)   Date Value   2020 139     Potassium (mmol/L)   Date Value   2020 4.4     Chloride (mmol/L)   Date Value   2020 105     Glucose (mg/dL)   Date Value   2020 103 (H)     CALCIUM (mg/dL)   Date Value   2020 9.3     Carbon Dioxide (mmol/L)   Date Value   2020 27     BUN (mg/dL)   Date Value   2020 20     Creatinine (mg/dL)   Date Value   2020 1.01       No results found for: BNP    Lab Results   Component Value Date    NTPROB 4,084 (H) 2020    NTPROB 6,259 (H) 2020    NTPROB 15,315 (H) 2019    NTPROB 6,373 (H) 2019    NTPROB 3,606 (H) 2018    NTPROB 1,203 (H) 2018       Studies reviewed  LHC: 1/10/2019- @ [facility] - no significant CAD  Echo: 2020 - EF: 10-15%  LVEDd: 9.5cm  mild MR  RV nl size   EC2020 - sinus tach - QRS: 114ms        Assessment/Plan:    1. Chronic systolic congestive heart failure (CMS/HCC)   Severe Chronic Systolic Heart Failure s/p ICD  NYHA Class III, Stage C - euvolemic on exam  Etiology: NICM - viral vs. HTNsive  LHC: 1/10/2019- @ [facility] - no significant CAD  Echo: 2020 - EF: 10-15%  LVEDd: 9.5cm  mild MR  RV nl size   EC2020 - sinus tach - QRS: 114ms    NtPrOBNP: 4084   CPX:  NA  GDMT: Carvedilol 12.5mg BID \ Entresto 24/26mg BID  spironolactone 25mg   Diuretics: torsemide 20mg daily  High Risk features: severe LV dysfunction, severely dilated LV, severely elevated  nt-ProBNP.  12/7/20 - Initiated discussion regarding patients heart failure.  Discussed etiology and severity.  Discussed good and bad prognostic markers as they pertain to the patient.  Discussed plan for ongoing optimization of medical therapy with eventual plan for reassessment with echocardiogram and CPX.  Discussed advanced therapies such as LVAD and transplant.  Patient expressed understanding and agreement with the plan.  Specifically Mr. Bennett has several high risk features including severe LV dysfunction, severely dilated ventricle and elevated nt-probnp. These were discussed with him today.  He seems to have mostly preserved functional class with no recent admission for HF and therefore continued optimization of medical therapies seem warranted with close monitoring for worsening. Will uptitrate his GDMT then plan for an echo and CPX.  Also advised that patient abstain from EtOH entirely.     2. Hypertensive heart disease with chronic systolic congestive heart failure (CMS/HCC)  BP well controlled on present meds    6. Pure hypercholesterolemia  On statin      No follow-ups on file.  Increase entresto to 49/51mg BID  Check CMP/BNP in 2-3 weeks  RTC in 1 month for f/u        Sony Loza MD [facility]  Advanced Heart Failure and Transplant Cardiology  Clinical  - [facility]/Internal Medicine   Director - Cardio-Oncology - [facility]  [facility]

## 2020-12-15 ENCOUNTER — APPOINTMENT (OUTPATIENT)
Dept: NEUROLOGY | Facility: CLINIC | Age: 55
End: 2020-12-15
Payer: MEDICAID

## 2020-12-15 ENCOUNTER — RESULT REVIEW (OUTPATIENT)
Age: 55
End: 2020-12-15

## 2020-12-15 ENCOUNTER — OUTPATIENT (OUTPATIENT)
Dept: OUTPATIENT SERVICES | Facility: HOSPITAL | Age: 55
LOS: 1 days | Discharge: HOME | End: 2020-12-15

## 2020-12-15 ENCOUNTER — OUTPATIENT (OUTPATIENT)
Dept: OUTPATIENT SERVICES | Facility: HOSPITAL | Age: 55
LOS: 1 days | Discharge: HOME | End: 2020-12-15
Payer: MEDICAID

## 2020-12-15 VITALS
TEMPERATURE: 97.1 F | OXYGEN SATURATION: 98 % | DIASTOLIC BLOOD PRESSURE: 77 MMHG | SYSTOLIC BLOOD PRESSURE: 108 MMHG | HEART RATE: 89 BPM

## 2020-12-15 VITALS
HEIGHT: 61 IN | DIASTOLIC BLOOD PRESSURE: 60 MMHG | OXYGEN SATURATION: 98 % | RESPIRATION RATE: 13 BRPM | TEMPERATURE: 97 F | HEART RATE: 75 BPM | WEIGHT: 147.49 LBS | SYSTOLIC BLOOD PRESSURE: 115 MMHG

## 2020-12-15 DIAGNOSIS — Z90.710 ACQUIRED ABSENCE OF BOTH CERVIX AND UTERUS: Chronic | ICD-10-CM

## 2020-12-15 DIAGNOSIS — Z01.818 ENCOUNTER FOR OTHER PREPROCEDURAL EXAMINATION: ICD-10-CM

## 2020-12-15 DIAGNOSIS — N39.3 STRESS INCONTINENCE (FEMALE) (MALE): ICD-10-CM

## 2020-12-15 DIAGNOSIS — Z86.011 PERSONAL HISTORY OF BENIGN NEOPLASM OF THE BRAIN: ICD-10-CM

## 2020-12-15 LAB
ALBUMIN SERPL ELPH-MCNC: 4.5 G/DL — SIGNIFICANT CHANGE UP (ref 3.5–5.2)
ALP SERPL-CCNC: 77 U/L — SIGNIFICANT CHANGE UP (ref 30–115)
ALT FLD-CCNC: 19 U/L — SIGNIFICANT CHANGE UP (ref 0–41)
ANION GAP SERPL CALC-SCNC: 11 MMOL/L — SIGNIFICANT CHANGE UP (ref 7–14)
APPEARANCE UR: ABNORMAL
APTT BLD: 29.3 SEC — SIGNIFICANT CHANGE UP (ref 27–39.2)
AST SERPL-CCNC: 18 U/L — SIGNIFICANT CHANGE UP (ref 0–41)
BASOPHILS # BLD AUTO: 0.02 K/UL — SIGNIFICANT CHANGE UP (ref 0–0.2)
BASOPHILS NFR BLD AUTO: 0.4 % — SIGNIFICANT CHANGE UP (ref 0–1)
BILIRUB SERPL-MCNC: <0.2 MG/DL — SIGNIFICANT CHANGE UP (ref 0.2–1.2)
BILIRUB UR-MCNC: NEGATIVE — SIGNIFICANT CHANGE UP
BUN SERPL-MCNC: 22 MG/DL — HIGH (ref 10–20)
CALCIUM SERPL-MCNC: 9.7 MG/DL — SIGNIFICANT CHANGE UP (ref 8.5–10.1)
CHLORIDE SERPL-SCNC: 101 MMOL/L — SIGNIFICANT CHANGE UP (ref 98–110)
CO2 SERPL-SCNC: 28 MMOL/L — SIGNIFICANT CHANGE UP (ref 17–32)
COLOR SPEC: YELLOW — SIGNIFICANT CHANGE UP
CREAT SERPL-MCNC: 0.7 MG/DL — SIGNIFICANT CHANGE UP (ref 0.7–1.5)
DIFF PNL FLD: NEGATIVE — SIGNIFICANT CHANGE UP
EOSINOPHIL # BLD AUTO: 0.05 K/UL — SIGNIFICANT CHANGE UP (ref 0–0.7)
EOSINOPHIL NFR BLD AUTO: 0.9 % — SIGNIFICANT CHANGE UP (ref 0–8)
GLUCOSE SERPL-MCNC: 81 MG/DL — SIGNIFICANT CHANGE UP (ref 70–99)
GLUCOSE UR QL: NEGATIVE — SIGNIFICANT CHANGE UP
HCT VFR BLD CALC: 39.1 % — SIGNIFICANT CHANGE UP (ref 37–47)
HGB BLD-MCNC: 12.5 G/DL — SIGNIFICANT CHANGE UP (ref 12–16)
IMM GRANULOCYTES NFR BLD AUTO: 0.2 % — SIGNIFICANT CHANGE UP (ref 0.1–0.3)
INR BLD: 0.88 RATIO — SIGNIFICANT CHANGE UP (ref 0.65–1.3)
KETONES UR-MCNC: ABNORMAL
LEUKOCYTE ESTERASE UR-ACNC: ABNORMAL
LYMPHOCYTES # BLD AUTO: 2.58 K/UL — SIGNIFICANT CHANGE UP (ref 1.2–3.4)
LYMPHOCYTES # BLD AUTO: 46.9 % — SIGNIFICANT CHANGE UP (ref 20.5–51.1)
MCHC RBC-ENTMCNC: 31.5 PG — HIGH (ref 27–31)
MCHC RBC-ENTMCNC: 32 G/DL — SIGNIFICANT CHANGE UP (ref 32–37)
MCV RBC AUTO: 98.5 FL — SIGNIFICANT CHANGE UP (ref 81–99)
MONOCYTES # BLD AUTO: 0.67 K/UL — HIGH (ref 0.1–0.6)
MONOCYTES NFR BLD AUTO: 12.2 % — HIGH (ref 1.7–9.3)
NEUTROPHILS # BLD AUTO: 2.17 K/UL — SIGNIFICANT CHANGE UP (ref 1.4–6.5)
NEUTROPHILS NFR BLD AUTO: 39.4 % — LOW (ref 42.2–75.2)
NITRITE UR-MCNC: NEGATIVE — SIGNIFICANT CHANGE UP
NRBC # BLD: 0 /100 WBCS — SIGNIFICANT CHANGE UP (ref 0–0)
PH UR: 6 — SIGNIFICANT CHANGE UP (ref 5–8)
PLATELET # BLD AUTO: 190 K/UL — SIGNIFICANT CHANGE UP (ref 130–400)
POTASSIUM SERPL-MCNC: 4.5 MMOL/L — SIGNIFICANT CHANGE UP (ref 3.5–5)
POTASSIUM SERPL-SCNC: 4.5 MMOL/L — SIGNIFICANT CHANGE UP (ref 3.5–5)
PROT SERPL-MCNC: 7.4 G/DL — SIGNIFICANT CHANGE UP (ref 6–8)
PROT UR-MCNC: ABNORMAL
PROTHROM AB SERPL-ACNC: 10.1 SEC — SIGNIFICANT CHANGE UP (ref 9.95–12.87)
RBC # BLD: 3.97 M/UL — LOW (ref 4.2–5.4)
RBC # FLD: 13.6 % — SIGNIFICANT CHANGE UP (ref 11.5–14.5)
SODIUM SERPL-SCNC: 140 MMOL/L — SIGNIFICANT CHANGE UP (ref 135–146)
SP GR SPEC: 1.04 — HIGH (ref 1.01–1.03)
UROBILINOGEN FLD QL: ABNORMAL
WBC # BLD: 5.5 K/UL — SIGNIFICANT CHANGE UP (ref 4.8–10.8)
WBC # FLD AUTO: 5.5 K/UL — SIGNIFICANT CHANGE UP (ref 4.8–10.8)

## 2020-12-15 PROCEDURE — 93010 ELECTROCARDIOGRAM REPORT: CPT

## 2020-12-15 PROCEDURE — 99214 OFFICE O/P EST MOD 30 MIN: CPT | Mod: GC

## 2020-12-15 PROCEDURE — 71046 X-RAY EXAM CHEST 2 VIEWS: CPT | Mod: 26

## 2020-12-15 RX ORDER — ESTRADIOL 4 UG/1
0 INSERT VAGINAL
Qty: 0 | Refills: 0 | DISCHARGE

## 2020-12-15 NOTE — END OF VISIT
[] : Resident [FreeTextEntry3] : Pt examined.  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  Has seizure disorder intractable.\par longest she can go w/o seizure is a few months.  Last few levels have shown compliance though there was some concern that last seizure may have been triggered by missed dose, patient denies that to me today.  Okay to proceed with for bladder sling.  Make sure no doses are missed.  Keppra and valproic acid can be given IV if needed at similar doses however oxcarbazepine is only available po.

## 2020-12-15 NOTE — H&P PST ADULT - NSICDXFAMILYHX_GEN_ALL_CORE_FT
FAMILY HISTORY:  Family history of COPD (chronic obstructive pulmonary disease)  FH: diabetes mellitus

## 2020-12-15 NOTE — ASSESSMENT
[FreeTextEntry1] : Ms. Segal is 56 y/o F with significant hx of seizures due to prior resection of brain mass/AVM with breakthrough, anemia, anxiety/depression,  currently stable awaiting clearance for urethral sling placement\par \par Recommendations:\par -Continue current AED regimen as per hpi\par -Patient is an intermediate risk patient for a low risk procedure and may proceed for ureteral sling placement on -\par 01/5/2020; patient educated on importance of being compliant with seizure medications as breakthrough seizure episodes is due to missed dosage\par - Ammonia level elevated from s/e of valproate-> started patient on levocarnitine 330 mg twice a day\par - f/u repeat results cmp/cbc/ammonia/keppra/valproate/carbamazepine levels in next visit\par  -f/u in 6 months\par

## 2020-12-15 NOTE — H&P PST ADULT - NSANTHOSAYNRD_GEN_A_CORE
No. CHERY screening performed.  STOP BANG Legend: 0-2 = LOW Risk; 3-4 = INTERMEDIATE Risk; 5-8 = HIGH Risk

## 2020-12-15 NOTE — H&P PST ADULT - NSICDXPASTMEDICALHX_GEN_ALL_CORE_FT
PAST MEDICAL HISTORY:  Anxiety     Arteriovenous malformation (AVM)     Asthma last attack 11/30/2020    History of radiation therapy 1992 - brain, for seizure    Seizures last seizure 3 days ago, x2 episodes

## 2020-12-15 NOTE — PHYSICAL EXAM
[General Appearance - Alert] : alert [General Appearance - In No Acute Distress] : in no acute distress [Person] : oriented to person [Place] : oriented to place [Time] : oriented to time [Remote Intact] : remote memory intact [Fluency] : fluency intact [Comprehension] : comprehension intact [Cranial Nerves Optic (II)] : visual acuity intact bilaterally,  visual fields full to confrontation, pupils equal round and reactive to light [Cranial Nerves Trigeminal (V)] : facial sensation intact symmetrically [Cranial Nerves Facial (VII)] : face symmetrical [Cranial Nerves Vestibulocochlear (VIII)] : hearing was intact bilaterally [Sclera] : the sclera and conjunctiva were normal [Extraocular Movements] : extraocular movements were intact [FreeTextEntry1] : Neurological Exam: \par Mental status: Awake, alert and oriented x3.  Recent and remote memory intact.  Naming, repetition and comprehension intact.  Attention/concentration intact.  No dysarthria, no aphasia.  Fund of knowledge appropriate.  \par Cranial nerves: Pupils equally round and reactive to light, visual fields full, no nystagmus, extraocular muscles intact, V1 through V3 intact bilaterally and symmetric, face symmetric, hearing intact to finger rub, palate elevation symmetric, tongue was midline.\par Motor:  residual mild LHP (baseline)\par Sensation: Intact to light touch, proprioception, and pinprick. \par Coordination: No dysmetria on finger-to-nose and heel-to-shin.  No dysdiadokinesia.\par Reflexes: 2+ in bilateral UE/LE, downgoing toes bilaterally. (-) Russ.\par Gait: hemiparetic\par \par

## 2020-12-16 PROBLEM — Q27.30 ARTERIOVENOUS MALFORMATION, SITE UNSPECIFIED: Chronic | Status: ACTIVE | Noted: 2020-12-15

## 2020-12-16 PROBLEM — F41.9 ANXIETY DISORDER, UNSPECIFIED: Chronic | Status: ACTIVE | Noted: 2020-12-15

## 2020-12-16 LAB
BACTERIA # UR AUTO: ABNORMAL
EPI CELLS # UR: 10 /HPF — HIGH (ref 0–5)
HYALINE CASTS # UR AUTO: 7 /LPF — SIGNIFICANT CHANGE UP (ref 0–7)
RBC CASTS # UR COMP ASSIST: 199 /HPF — HIGH (ref 0–4)
WBC UR QL: 14 /HPF — HIGH (ref 0–5)

## 2020-12-17 LAB
CULTURE RESULTS: SIGNIFICANT CHANGE UP
SPECIMEN SOURCE: SIGNIFICANT CHANGE UP

## 2020-12-20 LAB — VALPROATE SERPL-MCNC: 95 UG/ML

## 2020-12-23 ENCOUNTER — APPOINTMENT (OUTPATIENT)
Dept: INTERNAL MEDICINE | Facility: CLINIC | Age: 55
End: 2020-12-23
Payer: MEDICAID

## 2020-12-23 ENCOUNTER — OUTPATIENT (OUTPATIENT)
Dept: OUTPATIENT SERVICES | Facility: HOSPITAL | Age: 55
LOS: 1 days | Discharge: HOME | End: 2020-12-23

## 2020-12-23 VITALS
HEART RATE: 99 BPM | OXYGEN SATURATION: 94 % | SYSTOLIC BLOOD PRESSURE: 108 MMHG | TEMPERATURE: 96.4 F | BODY MASS INDEX: 27.23 KG/M2 | HEIGHT: 62 IN | DIASTOLIC BLOOD PRESSURE: 70 MMHG | WEIGHT: 148 LBS

## 2020-12-23 DIAGNOSIS — Z90.710 ACQUIRED ABSENCE OF BOTH CERVIX AND UTERUS: Chronic | ICD-10-CM

## 2020-12-23 PROBLEM — J45.909 UNSPECIFIED ASTHMA, UNCOMPLICATED: Chronic | Status: ACTIVE | Noted: 2020-12-15

## 2020-12-23 PROBLEM — Z92.3 PERSONAL HISTORY OF IRRADIATION: Chronic | Status: ACTIVE | Noted: 2020-12-15

## 2020-12-23 PROBLEM — R56.9 UNSPECIFIED CONVULSIONS: Chronic | Status: ACTIVE | Noted: 2020-12-15

## 2020-12-23 PROCEDURE — 99213 OFFICE O/P EST LOW 20 MIN: CPT | Mod: GC

## 2020-12-24 DIAGNOSIS — Z01.818 ENCOUNTER FOR OTHER PREPROCEDURAL EXAMINATION: ICD-10-CM

## 2020-12-31 NOTE — PHYSICAL EXAM
[No Acute Distress] : no acute distress [Well Nourished] : well nourished [Well Developed] : well developed [Normal Sclera/Conjunctiva] : normal sclera/conjunctiva [Normal Outer Ear/Nose] : the outer ears and nose were normal in appearance [Normal Oropharynx] : the oropharynx was normal [No JVD] : no jugular venous distention [No Lymphadenopathy] : no lymphadenopathy [No Respiratory Distress] : no respiratory distress  [No Accessory Muscle Use] : no accessory muscle use [Normal Rate] : normal rate  [Regular Rhythm] : with a regular rhythm [Normal S1, S2] : normal S1 and S2 [No Carotid Bruits] : no carotid bruits [No Edema] : there was no peripheral edema [Soft] : abdomen soft [Non Tender] : non-tender [No HSM] : no HSM [Normal Posterior Cervical Nodes] : no posterior cervical lymphadenopathy [Normal Anterior Cervical Nodes] : no anterior cervical lymphadenopathy [No CVA Tenderness] : no CVA  tenderness [No Spinal Tenderness] : no spinal tenderness [No Joint Swelling] : no joint swelling [Grossly Normal Strength/Tone] : grossly normal strength/tone [No Rash] : no rash [Coordination Grossly Intact] : coordination grossly intact [Normal Gait] : normal gait [Normal Affect] : the affect was normal [Normal Insight/Judgement] : insight and judgment were intact [de-identified] : residual left hemimotor, balance maintainence problem

## 2020-12-31 NOTE — REVIEW OF SYSTEMS
[Vision Problems] : vision problems [Wheezing] : wheezing [Fever] : no fever [Chills] : no chills [Fatigue] : no fatigue [Night Sweats] : no night sweats [Pain] : no pain [Itching] : no itching [Earache] : no earache [Hearing Loss] : no hearing loss [Nasal Discharge] : no nasal discharge [Sore Throat] : no sore throat [Chest Pain] : no chest pain [Palpitations] : no palpitations [Lower Ext Edema] : no lower extremity edema [Orthopnea] : no orthopnea [Shortness Of Breath] : no shortness of breath [Cough] : no cough [Abdominal Pain] : no abdominal pain [Nausea] : no nausea [Constipation] : no constipation [Vomiting] : no vomiting [Heartburn] : no heartburn [Melena] : no melena [Dysuria] : no dysuria [Incontinence] : no incontinence [Hematuria] : no hematuria [Joint Pain] : no joint pain [Joint Stiffness] : no joint stiffness [Muscle Pain] : no muscle pain [Back Pain] : no back pain [Itching] : no itching [Skin Rash] : no skin rash [Headache] : no headache [Dizziness] : no dizziness [Memory Loss] : no memory loss [Suicidal] : not suicidal [Insomnia] : no insomnia [Easy Bleeding] : no easy bleeding [FreeTextEntry3] : wears spectacles

## 2020-12-31 NOTE — ASSESSMENT
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\par  h/o of seizure\par  on anti seizure medication (Keppra, valproic acid and oxcarbazepine)\par compliance reported\par  follows with neurologist dr steven montgomery\par walker prescribed by the Neurologist\par \par # h/o of stress incontinence\par  follow with gynecologist\par  s/p pessary\par sling placement planned for jan 2021\par Preop clearance obtained from Neuro, will order nuclear stress test and COVID-19 PCR as patient complained of some shortness of breath while climbing stairs with chest pain, EKG done on 12/15/2020 showed 1st degree AV block, no ST changes\par \par HCM\par  mammogram last 2020 negative\par  pap smear uptodate / f/u with GYN \par  colonoscopy:normal results in 2018 per the patient\par  flu shot on october 5th 2020\par RTC 6 months or prn\par \par \par \par \par  \par

## 2020-12-31 NOTE — HISTORY OF PRESENT ILLNESS
[FreeTextEntry1] : 54 Yo F here for follow up visit.  [de-identified] : 55 yrold female withpmh/oseizure secondary to avm since 1986,asthma,prediabetes EVERETTE off iron,urinary incontinence s/p pessary by Uro gyn here for

## 2021-01-01 ENCOUNTER — OUTPATIENT (OUTPATIENT)
Dept: OUTPATIENT SERVICES | Facility: HOSPITAL | Age: 56
LOS: 1 days | Discharge: HOME | End: 2021-01-01

## 2021-01-01 ENCOUNTER — LABORATORY RESULT (OUTPATIENT)
Age: 56
End: 2021-01-01

## 2021-01-01 DIAGNOSIS — Z90.710 ACQUIRED ABSENCE OF BOTH CERVIX AND UTERUS: Chronic | ICD-10-CM

## 2021-01-01 DIAGNOSIS — Z11.59 ENCOUNTER FOR SCREENING FOR OTHER VIRAL DISEASES: ICD-10-CM

## 2021-01-02 ENCOUNTER — LABORATORY RESULT (OUTPATIENT)
Age: 56
End: 2021-01-02

## 2021-01-02 ENCOUNTER — OUTPATIENT (OUTPATIENT)
Dept: OUTPATIENT SERVICES | Facility: HOSPITAL | Age: 56
LOS: 1 days | Discharge: HOME | End: 2021-01-02

## 2021-01-02 DIAGNOSIS — Z11.59 ENCOUNTER FOR SCREENING FOR OTHER VIRAL DISEASES: ICD-10-CM

## 2021-01-02 DIAGNOSIS — Z90.710 ACQUIRED ABSENCE OF BOTH CERVIX AND UTERUS: Chronic | ICD-10-CM

## 2021-01-02 LAB
AMMONIA PLAS-MCNC: 60 UMOL/L
LEVETIRACETAM SERPL-MCNC: 70.5 UG/ML
OXCARBAZEPINE SERPL-MCNC: 19 UG/ML

## 2021-01-04 ENCOUNTER — RESULT REVIEW (OUTPATIENT)
Age: 56
End: 2021-01-04

## 2021-01-04 ENCOUNTER — OUTPATIENT (OUTPATIENT)
Dept: OUTPATIENT SERVICES | Facility: HOSPITAL | Age: 56
LOS: 1 days | Discharge: HOME | End: 2021-01-04
Payer: MEDICAID

## 2021-01-04 DIAGNOSIS — Z01.818 ENCOUNTER FOR OTHER PREPROCEDURAL EXAMINATION: ICD-10-CM

## 2021-01-04 DIAGNOSIS — Z90.710 ACQUIRED ABSENCE OF BOTH CERVIX AND UTERUS: Chronic | ICD-10-CM

## 2021-01-04 PROCEDURE — 93018 CV STRESS TEST I&R ONLY: CPT

## 2021-01-04 PROCEDURE — 93016 CV STRESS TEST SUPVJ ONLY: CPT

## 2021-01-04 PROCEDURE — 78452 HT MUSCLE IMAGE SPECT MULT: CPT | Mod: 26

## 2021-01-04 RX ORDER — REGADENOSON 0.08 MG/ML
0.4 INJECTION, SOLUTION INTRAVENOUS ONCE
Refills: 0 | Status: DISCONTINUED | OUTPATIENT
Start: 2021-01-04 | End: 2021-01-18

## 2021-01-05 ENCOUNTER — OUTPATIENT (OUTPATIENT)
Dept: OUTPATIENT SERVICES | Facility: HOSPITAL | Age: 56
LOS: 1 days | Discharge: HOME | End: 2021-01-05
Payer: MEDICAID

## 2021-01-05 ENCOUNTER — NON-APPOINTMENT (OUTPATIENT)
Age: 56
End: 2021-01-05

## 2021-01-05 ENCOUNTER — RESULT REVIEW (OUTPATIENT)
Age: 56
End: 2021-01-05

## 2021-01-05 ENCOUNTER — TRANSCRIPTION ENCOUNTER (OUTPATIENT)
Age: 56
End: 2021-01-05

## 2021-01-05 VITALS
TEMPERATURE: 97 F | OXYGEN SATURATION: 96 % | DIASTOLIC BLOOD PRESSURE: 73 MMHG | WEIGHT: 145.06 LBS | HEART RATE: 93 BPM | RESPIRATION RATE: 17 BRPM | HEIGHT: 61 IN | SYSTOLIC BLOOD PRESSURE: 111 MMHG

## 2021-01-05 VITALS
DIASTOLIC BLOOD PRESSURE: 65 MMHG | SYSTOLIC BLOOD PRESSURE: 118 MMHG | RESPIRATION RATE: 18 BRPM | HEART RATE: 84 BPM | OXYGEN SATURATION: 97 %

## 2021-01-05 DIAGNOSIS — Z90.710 ACQUIRED ABSENCE OF BOTH CERVIX AND UTERUS: Chronic | ICD-10-CM

## 2021-01-05 LAB — BLD GP AB SCN SERPL QL: SIGNIFICANT CHANGE UP

## 2021-01-05 PROCEDURE — 88305 TISSUE EXAM BY PATHOLOGIST: CPT | Mod: 26

## 2021-01-05 PROCEDURE — 52204 CYSTOSCOPY W/BIOPSY(S): CPT | Mod: 59

## 2021-01-05 PROCEDURE — 88312 SPECIAL STAINS GROUP 1: CPT | Mod: 26

## 2021-01-05 PROCEDURE — 57288 REPAIR BLADDER DEFECT: CPT

## 2021-01-05 RX ORDER — PHENAZOPYRIDINE HCL 100 MG
200 TABLET ORAL ONCE
Refills: 0 | Status: COMPLETED | OUTPATIENT
Start: 2021-01-05 | End: 2021-01-05

## 2021-01-05 RX ORDER — HYDROMORPHONE HYDROCHLORIDE 2 MG/ML
1 INJECTION INTRAMUSCULAR; INTRAVENOUS; SUBCUTANEOUS
Refills: 0 | Status: DISCONTINUED | OUTPATIENT
Start: 2021-01-05 | End: 2021-01-05

## 2021-01-05 RX ORDER — ENOXAPARIN SODIUM 100 MG/ML
40 INJECTION SUBCUTANEOUS EVERY 24 HOURS
Refills: 0 | Status: DISCONTINUED | OUTPATIENT
Start: 2021-01-05 | End: 2021-01-05

## 2021-01-05 RX ORDER — IBUPROFEN 200 MG
1 TABLET ORAL
Qty: 60 | Refills: 0
Start: 2021-01-05

## 2021-01-05 RX ORDER — SODIUM CHLORIDE 9 MG/ML
1000 INJECTION, SOLUTION INTRAVENOUS
Refills: 0 | Status: DISCONTINUED | OUTPATIENT
Start: 2021-01-05 | End: 2021-01-05

## 2021-01-05 RX ORDER — HYDROMORPHONE HYDROCHLORIDE 2 MG/ML
0.5 INJECTION INTRAMUSCULAR; INTRAVENOUS; SUBCUTANEOUS
Refills: 0 | Status: DISCONTINUED | OUTPATIENT
Start: 2021-01-05 | End: 2021-01-05

## 2021-01-05 RX ORDER — DOCUSATE SODIUM 100 MG
1 CAPSULE ORAL
Qty: 60 | Refills: 1
Start: 2021-01-05 | End: 2021-03-05

## 2021-01-05 RX ORDER — OXCARBAZEPINE 300 MG/1
300 TABLET, FILM COATED ORAL ONCE
Refills: 0 | Status: COMPLETED | OUTPATIENT
Start: 2021-01-05 | End: 2021-01-05

## 2021-01-05 RX ADMIN — OXCARBAZEPINE 300 MILLIGRAM(S): 300 TABLET, FILM COATED ORAL at 15:37

## 2021-01-05 RX ADMIN — Medication 200 MILLIGRAM(S): at 10:04

## 2021-01-05 RX ADMIN — ENOXAPARIN SODIUM 40 MILLIGRAM(S): 100 INJECTION SUBCUTANEOUS at 10:04

## 2021-01-05 RX ADMIN — SODIUM CHLORIDE 100 MILLILITER(S): 9 INJECTION, SOLUTION INTRAVENOUS at 15:15

## 2021-01-05 NOTE — CHART NOTE - NSCHARTNOTEFT_GEN_A_CORE
PACU ANESTHESIA ADMISSION NOTE      Procedure: Bladder biopsy    Sling operation for stress incontinence      Post op diagnosis:  Lesion of bladder    Stress incontinence        ____  Intubated  TV:______       Rate: ______      FiO2: ______    __x__  Patent Airway    __x__  Full return of protective reflexes    __x__  Full recovery from anesthesia / back to baseline     Vitals:   See Anesthesia record  T- 96.8 P- 67 R-18 B/P- 114/60 SPO2%- 97%    Mental Status:  __x__ Awake   __x___ Alert   _____ Drowsy   _____ Sedated    Nausea/Vomiting:  __x__ NO  ______Yes,   See Post - Op Orders          Pain Scale (0-10):  _0____    Treatment: ____ None    ____ See Post - Op/PCA Orders    Post - Operative Fluids:   ____ Oral   __x__ See Post - Op Orders    Plan: Discharge:   __x__Home       _____Floor     _____Critical Care    _____  Other:_________________    Comments: No anesthesia complications/issues noted. Discharge when criteria met.
PGY 2 Note    Patient seen and evaluated at bedside for trial of void, 250cc of normal saline inserted into bladder. Patient only voided 50cc, will reinsert sarabia catheter and discharge home with catheter.    Dr. Daley aware, will inform Dr. Pride

## 2021-01-05 NOTE — ASU DISCHARGE PLAN (ADULT/PEDIATRIC) - CARE PROVIDER_API CALL
Maylin Pride)  [facility]; Obstetrics and Gynecology  [address] [place] [address]  Phone: [phone]  Fax: [phone]  Follow Up Time:

## 2021-01-05 NOTE — BRIEF OPERATIVE NOTE - NSICDXBRIEFPOSTOP_GEN_ALL_CORE_FT
POST-OP DIAGNOSIS:  Lesion of bladder 05-Jan-2021 14:51:15  Maylin Pride  Stress incontinence 05-Jan-2021 14:51:03  Maylin Pride

## 2021-01-05 NOTE — ASU DISCHARGE PLAN (ADULT/PEDIATRIC) - CALL YOUR DOCTOR IF YOU HAVE ANY OF THE FOLLOWING:
Attended afternoon meet and greet. Updated on evening staffing and unit expectations. Patients engaged in [employer] with nursing students and pop culture trivia before dinner. Patient 1 of 12 in attendance. Bleeding that does not stop/Fever greater than (need to indicate Fahrenheit or Celsius)

## 2021-01-05 NOTE — ASU PATIENT PROFILE, ADULT - PMH
Anxiety    Arteriovenous malformation (AVM)    Asthma  last attack 11/30/2020  History of radiation therapy  1992 - brain, for seizure  Seizures  last seizure 3 days ago, x2 episodes

## 2021-01-05 NOTE — BRIEF OPERATIVE NOTE - NSICDXBRIEFPROCEDURE_GEN_ALL_CORE_FT
PROCEDURES:  Bladder biopsy 05-Jan-2021 14:49:58  Maylin Pride  Sling operation for stress incontinence 05-Jan-2021 14:49:33  Maylin Pride

## 2021-01-07 LAB — SURGICAL PATHOLOGY STUDY: SIGNIFICANT CHANGE UP

## 2021-01-12 ENCOUNTER — OUTPATIENT (OUTPATIENT)
Dept: OUTPATIENT SERVICES | Facility: HOSPITAL | Age: 56
LOS: 1 days | Discharge: HOME | End: 2021-01-12

## 2021-01-12 ENCOUNTER — APPOINTMENT (OUTPATIENT)
Dept: UROGYNECOLOGY | Facility: CLINIC | Age: 56
End: 2021-01-12
Payer: MEDICAID

## 2021-01-12 VITALS
BODY MASS INDEX: 27.23 KG/M2 | SYSTOLIC BLOOD PRESSURE: 100 MMHG | DIASTOLIC BLOOD PRESSURE: 70 MMHG | HEIGHT: 62 IN | WEIGHT: 148 LBS

## 2021-01-12 DIAGNOSIS — G40.909 EPILEPSY, UNSPECIFIED, NOT INTRACTABLE, WITHOUT STATUS EPILEPTICUS: ICD-10-CM

## 2021-01-12 DIAGNOSIS — N39.3 STRESS INCONTINENCE (FEMALE) (MALE): ICD-10-CM

## 2021-01-12 DIAGNOSIS — F41.9 ANXIETY DISORDER, UNSPECIFIED: ICD-10-CM

## 2021-01-12 DIAGNOSIS — Z90.710 ACQUIRED ABSENCE OF BOTH CERVIX AND UTERUS: Chronic | ICD-10-CM

## 2021-01-12 DIAGNOSIS — Z92.3 PERSONAL HISTORY OF IRRADIATION: ICD-10-CM

## 2021-01-12 DIAGNOSIS — N30.00 ACUTE CYSTITIS WITHOUT HEMATURIA: ICD-10-CM

## 2021-01-12 DIAGNOSIS — N32.9 BLADDER DISORDER, UNSPECIFIED: ICD-10-CM

## 2021-01-12 DIAGNOSIS — J45.909 UNSPECIFIED ASTHMA, UNCOMPLICATED: ICD-10-CM

## 2021-01-12 PROCEDURE — 99213 OFFICE O/P EST LOW 20 MIN: CPT | Mod: 25

## 2021-01-12 PROCEDURE — 51700 IRRIGATION OF BLADDER: CPT | Mod: 58

## 2021-01-12 NOTE — COUNSELING
[FreeTextEntry1] : Please continue to follow post op restrictions.\par \par Please call our office if you should experience a fever greater than 100.4, difficulty or inability to void that lasts greater than four to six hours, urinary frequency/urgency, urinary incontinence, or if you have cloudy, foul smelling urine or burning with urination\par \par Please schedule your post op visit on 1/19/2021\par

## 2021-01-12 NOTE — HISTORY OF PRESENT ILLNESS
[FreeTextEntry1] : Pt is here for a post op voiding trial s/p retropubic sling/cysto 1/5/2021.\par \par 10/21/20: urodynamics:\par sensitive bladder, no USUI, no obstructive voiding\par H/o seizures\par s/p brain surgery for benign mass\par \par Today pt states she is doing well, just uncomfortable with the sarabia bag.

## 2021-01-12 NOTE — DISCUSSION/SUMMARY
[FreeTextEntry1] : Stress urinary incontinence-\par Patient passed voiding trial.\par Precautions provided\par Will return for her scheduled 2 week postop visit.\par

## 2021-01-12 NOTE — PHYSICAL EXAM
[Chaperone Present] : A chaperone was present in the examining room during all aspects of the physical examination [No Acute Distress] : in no acute distress [Well developed] : well developed [Well Nourished] : ~L well nourished [FreeTextEntry1] : Patient presents to the office for a voiding trial. Patient identified. Procedure explained to the patient. Patient has bag which is draining 100 cc clear, yellow urine. Patient placed in lithotomy position.  Mcdowell disconnected from bag. Instilled  180cc sterile water into bladder until patient felt a strong urge to void. Using a 10 cc syringe, catheter balloon deflated and Mcdowell catheter removed without difficulty. Assisted patient to the commode where she voided  130cc without difficulty.\par \par Patient is scheduled for her two week postop visit on . Patient instructed to call our office if she should experience a fever greater than 100.4, difficulty or inability to void that lasts greater than four to six hours, urinary frequency/urgency, urinary incontinence, or if she has cloudy, foul smelling urine or dysuria. Patient instructed to call our office with any other questions, concerns or problems. Assisted patient to waiting room.\par

## 2021-01-19 ENCOUNTER — APPOINTMENT (OUTPATIENT)
Dept: NEUROLOGY | Facility: CLINIC | Age: 56
End: 2021-01-19

## 2021-01-20 NOTE — ASU PREOP CHECKLIST - SIDE RAILS UP
P Quality Flow/Interdisciplinary Rounds Progress Note        Quality Flow Rounds held on January 20, 2021    Disciplines Attending:  Bedside Nurse, ,  and [facility] 1[facility]15/2021  1:20 PM    Anticipated Discharge Date:  Expected Discharge Date: 01/21/21    Disposition:    Michael Score:  Michael Scale Score: 20    Readmission Risk              Risk of Unplanned Readmission:        18           Discussed patient goal for the day, patient clinical progression, and barriers to discharge. The following Goal(s) of the Day/Commitment(s) have been identified:  Weaning oxygen, discharge planning.        Yovani Healy  January 20, 2021 done

## 2021-01-21 ENCOUNTER — OUTPATIENT (OUTPATIENT)
Dept: OUTPATIENT SERVICES | Facility: HOSPITAL | Age: 56
LOS: 1 days | Discharge: HOME | End: 2021-01-21
Payer: MEDICAID

## 2021-01-21 ENCOUNTER — RESULT REVIEW (OUTPATIENT)
Age: 56
End: 2021-01-21

## 2021-01-21 DIAGNOSIS — Z90.710 ACQUIRED ABSENCE OF BOTH CERVIX AND UTERUS: Chronic | ICD-10-CM

## 2021-01-21 DIAGNOSIS — Z12.31 ENCOUNTER FOR SCREENING MAMMOGRAM FOR MALIGNANT NEOPLASM OF BREAST: ICD-10-CM

## 2021-01-21 PROCEDURE — 77067 SCR MAMMO BI INCL CAD: CPT | Mod: 26

## 2021-01-21 PROCEDURE — 77063 BREAST TOMOSYNTHESIS BI: CPT | Mod: 26

## 2021-01-27 ENCOUNTER — OUTPATIENT (OUTPATIENT)
Dept: OUTPATIENT SERVICES | Facility: HOSPITAL | Age: 56
LOS: 1 days | Discharge: HOME | End: 2021-01-27

## 2021-01-27 ENCOUNTER — APPOINTMENT (OUTPATIENT)
Dept: UROGYNECOLOGY | Facility: CLINIC | Age: 56
End: 2021-01-27
Payer: MEDICAID

## 2021-01-27 VITALS
DIASTOLIC BLOOD PRESSURE: 82 MMHG | HEIGHT: 62 IN | BODY MASS INDEX: 27.23 KG/M2 | WEIGHT: 148 LBS | SYSTOLIC BLOOD PRESSURE: 112 MMHG

## 2021-01-27 DIAGNOSIS — Z90.710 ACQUIRED ABSENCE OF BOTH CERVIX AND UTERUS: Chronic | ICD-10-CM

## 2021-01-27 PROCEDURE — 99024 POSTOP FOLLOW-UP VISIT: CPT

## 2021-01-27 NOTE — DISCUSSION/SUMMARY
[FreeTextEntry1] : \par Stress Incontinence\par Reviewed postoperative restrictions. All of the patient's questions and concerns were answered. Advised to return for her next scheduled postop visit or earlier if she has any issues. The patient voiced understanding and is happy with the plan.\par

## 2021-01-27 NOTE — ADDENDUM
[FreeTextEntry1] : \par Please continue with all of your postoperative restrictions\par \par Please continue with the stool softeners\par \par Call with any issues\par \par Return for your next scheduled postop visit on 2/10/2021\par

## 2021-02-10 ENCOUNTER — APPOINTMENT (OUTPATIENT)
Dept: UROGYNECOLOGY | Facility: CLINIC | Age: 56
End: 2021-02-10
Payer: MEDICAID

## 2021-02-10 ENCOUNTER — OUTPATIENT (OUTPATIENT)
Dept: OUTPATIENT SERVICES | Facility: HOSPITAL | Age: 56
LOS: 1 days | Discharge: HOME | End: 2021-02-10

## 2021-02-10 VITALS — SYSTOLIC BLOOD PRESSURE: 130 MMHG | DIASTOLIC BLOOD PRESSURE: 70 MMHG | BODY MASS INDEX: 27.07 KG/M2 | WEIGHT: 148 LBS

## 2021-02-10 DIAGNOSIS — Z01.419 ENCOUNTER FOR GYNECOLOGICAL EXAMINATION (GENERAL) (ROUTINE) W/OUT ABNORMAL FINDINGS: ICD-10-CM

## 2021-02-10 DIAGNOSIS — Z90.710 ACQUIRED ABSENCE OF BOTH CERVIX AND UTERUS: Chronic | ICD-10-CM

## 2021-02-10 DIAGNOSIS — N95.2 POSTMENOPAUSAL ATROPHIC VAGINITIS: ICD-10-CM

## 2021-02-10 DIAGNOSIS — N39.3 STRESS INCONTINENCE (FEMALE) (MALE): ICD-10-CM

## 2021-02-10 DIAGNOSIS — Z01.818 ENCOUNTER FOR OTHER PREPROCEDURAL EXAMINATION: ICD-10-CM

## 2021-02-10 PROCEDURE — 99024 POSTOP FOLLOW-UP VISIT: CPT

## 2021-02-10 RX ORDER — ESTRADIOL 0.1 MG/G
0.1 CREAM VAGINAL
Qty: 1 | Refills: 3 | Status: DISCONTINUED | COMMUNITY
Start: 2019-12-06 | End: 2021-02-10

## 2021-02-10 NOTE — ADDENDUM
[FreeTextEntry1] : \par You look great!\par \par All postoperative restrictions are lifted at this time\par \par Call with any issues\par \par Please continue your routine gyn care with your general gynecologist\par \par Follow up as needed

## 2021-02-10 NOTE — OBJECTIVE
[Healing well] : healing well [No Masses or Tenderness] : no masses or tenderness [FreeTextEntry3] : no mesh exposure, sutures dissolved, incision line intact

## 2021-02-10 NOTE — DISCUSSION/SUMMARY
[FreeTextEntry1] : \par Stress incontinence-\par The patient is very happy with the postoperative results. All postoperative restrictions are lifted. Advised to to continue routine gyn care with her primary gyn provider. Patient advised to follow up with us if she has any issues . The patient voiced understanding and agrees with the plan.\par \par

## 2021-02-17 ENCOUNTER — OUTPATIENT (OUTPATIENT)
Dept: OUTPATIENT SERVICES | Facility: HOSPITAL | Age: 56
LOS: 1 days | Discharge: HOME | End: 2021-02-17

## 2021-02-17 ENCOUNTER — APPOINTMENT (OUTPATIENT)
Dept: INTERNAL MEDICINE | Facility: CLINIC | Age: 56
End: 2021-02-17
Payer: MEDICAID

## 2021-02-17 DIAGNOSIS — J45.909 UNSPECIFIED ASTHMA, UNCOMPLICATED: ICD-10-CM

## 2021-02-17 DIAGNOSIS — G40.909 EPILEPSY, UNSPECIFIED, NOT INTRACTABLE, WITHOUT STATUS EPILEPTICUS: ICD-10-CM

## 2021-02-17 DIAGNOSIS — Z90.710 ACQUIRED ABSENCE OF BOTH CERVIX AND UTERUS: Chronic | ICD-10-CM

## 2021-02-17 PROCEDURE — 99214 OFFICE O/P EST MOD 30 MIN: CPT | Mod: 95,GC

## 2021-02-17 NOTE — ASSESSMENT
[FreeTextEntry1] : 55 yrold female with pmh/seizure secondary to avm since 1986,asthma,prediabetes EVERETTE off iron,urinary incontinence s/p pessary by Uro gyn here for Follow up\par \par # asthma\par  -well controlled\par  -c/w same bronchodilators albuterol/ipratropium and symbicort.\par \par #Unsteady gait:\par - Neurologic cause, pt follows with neurology. \par - uses a cane . \par - has a shower chair. \par - Follows up with PT. \par - requires script for bathroom chair assist.. \par \par # prediabetes last hba1c 6 in May 2020\par  - on dietary control\par - repeat HBa1c before next appointment.\par \par # DLD\par - last ldl 126\par - \par -ASCVD 10 year risk 2.1%\par -Advised lifestyle modification and dietary modifications. \par - repeat labs before next appointment. 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 \par

## 2021-02-17 NOTE — HISTORY OF PRESENT ILLNESS
[Home] : at home, [unfilled] , at the time of the visit. [Spouse] : spouse [Verbal consent obtained from patient] : the patient, [unfilled] [Medical Office: ([facility])___] : at the medical office located in  [FreeTextEntry1] : Follow up [de-identified] : 55 yrold female with pmh/seizure secondary to AVM since 1986,asthma,prediabetes EVERETTE off iron,urinary incontinence s/p pessary by Uro gyn here for Follow up. no fever, no chills, no cough, no sputum. asthma is well controlled. she's been feeling  unsteady on her feet, she tripped but hasn't fallen. She can't walk in a straight line. She has a shower chair. She was prescribed a cane. She requires a chair assist for the bathroom chair. She goes back to physical therapy in March.

## 2021-02-17 NOTE — END OF VISIT
[] : Resident [FreeTextEntry3] : I was present with the Resident during the key portions of this encounter and provided supervision via audio/visual technology.  I agree with the findings and plan as documented in the Resident's note, unless noted below.\par

## 2021-02-18 ENCOUNTER — NON-APPOINTMENT (OUTPATIENT)
Age: 56
End: 2021-02-18

## 2021-03-23 ENCOUNTER — NON-APPOINTMENT (OUTPATIENT)
Age: 56
End: 2021-03-23

## 2021-03-24 ENCOUNTER — NON-APPOINTMENT (OUTPATIENT)
Age: 56
End: 2021-03-24

## 2021-03-30 ENCOUNTER — APPOINTMENT (OUTPATIENT)
Dept: NEUROLOGY | Facility: CLINIC | Age: 56
End: 2021-03-30

## 2021-04-05 ENCOUNTER — APPOINTMENT (OUTPATIENT)
Dept: INTERNAL MEDICINE | Facility: CLINIC | Age: 56
End: 2021-04-05
Payer: MEDICAID

## 2021-04-05 ENCOUNTER — OUTPATIENT (OUTPATIENT)
Dept: OUTPATIENT SERVICES | Facility: HOSPITAL | Age: 56
LOS: 1 days | Discharge: HOME | End: 2021-04-05

## 2021-04-05 VITALS
SYSTOLIC BLOOD PRESSURE: 106 MMHG | HEIGHT: 62 IN | BODY MASS INDEX: 28.16 KG/M2 | TEMPERATURE: 96.9 F | WEIGHT: 153 LBS | HEART RATE: 84 BPM | DIASTOLIC BLOOD PRESSURE: 70 MMHG | OXYGEN SATURATION: 97 %

## 2021-04-05 DIAGNOSIS — Z90.710 ACQUIRED ABSENCE OF BOTH CERVIX AND UTERUS: Chronic | ICD-10-CM

## 2021-04-05 PROCEDURE — 99214 OFFICE O/P EST MOD 30 MIN: CPT | Mod: GC

## 2021-04-05 NOTE — REVIEW OF SYSTEMS
[Unsteady Walking] : ataxia [Fever] : no fever [Chills] : no chills [Fatigue] : no fatigue [Night Sweats] : no night sweats [Vision Problems] : no vision problems [Hearing Loss] : no hearing loss [Chest Pain] : no chest pain [Palpitations] : no palpitations [Shortness Of Breath] : no shortness of breath [Wheezing] : no wheezing [Cough] : no cough [Dyspnea on Exertion] : no dyspnea on exertion [Abdominal Pain] : no abdominal pain [Nausea] : no nausea [Constipation] : no constipation [Vomiting] : no vomiting [Heartburn] : no heartburn [Dysuria] : no dysuria [Frequency] : no frequency [Joint Pain] : no joint pain [Muscle Pain] : no muscle pain [Itching] : no itching [Skin Rash] : no skin rash [Headache] : no headache [Dizziness] : no dizziness

## 2021-04-05 NOTE — PHYSICAL EXAM
[No Acute Distress] : no acute distress [Well Nourished] : well nourished [Well Developed] : well developed [Normal Sclera/Conjunctiva] : normal sclera/conjunctiva [PERRL] : pupils equal round and reactive to light [EOMI] : extraocular movements intact [No Lymphadenopathy] : no lymphadenopathy [No Respiratory Distress] : no respiratory distress  [No Accessory Muscle Use] : no accessory muscle use [Clear to Auscultation] : lungs were clear to auscultation bilaterally [Normal Rate] : normal rate  [Regular Rhythm] : with a regular rhythm [Normal S1, S2] : normal S1 and S2 [No Murmur] : no murmur heard [Soft] : abdomen soft [Non Tender] : non-tender [Non-distended] : non-distended [No Masses] : no abdominal mass palpated [No HSM] : no HSM [Normal Bowel Sounds] : normal bowel sounds [Normal Affect] : the affect was normal [Alert and Oriented x3] : oriented to person, place, and time

## 2021-04-05 NOTE — ASSESSMENT
[FreeTextEntry1] : 54yo female with pmhx of seizure secondary to AVM since 1986, asthma, prediabetes EVERETTE off iron, urinary incontinence s/p pessary by Uro gyn here for follow up.\par \par #Unsteady gait\par - Neurologic cause, pt follows with neurology. \par - uses a cane; has a shower chair. \par - Follows up with PT. 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 \par - repeat labs before next appointment. \par \par # h/o of stress incontinence\par - follow with gynecologist\par - s/p pessary\par \par # HCM\par - meds renewed this visit\par - mammogram last 2020 negative\par - pap smear uptodate / f/u with GYN \par  -colonoscopy:normal results in 2018 per the patient\par - flu shot on October 5th 2020\par - patient has appointment in summer and they will get labs in June-July \par  \par

## 2021-04-05 NOTE — HISTORY OF PRESENT ILLNESS
[FreeTextEntry1] : home care form  [de-identified] : 56yo female with pmhx of seizure secondary to AVM since 1986, asthma, prediabetes EVERETTE off iron, urinary incontinence s/p pessary by Uro gyn here for follow up. Patient comes in requesting forms for home care. Patient has no complaints. \par

## 2021-04-06 DIAGNOSIS — J45.909 UNSPECIFIED ASTHMA, UNCOMPLICATED: ICD-10-CM

## 2021-04-06 DIAGNOSIS — Z00.00 ENCOUNTER FOR GENERAL ADULT MEDICAL EXAMINATION WITHOUT ABNORMAL FINDINGS: ICD-10-CM

## 2021-04-06 DIAGNOSIS — F32.9 MAJOR DEPRESSIVE DISORDER, SINGLE EPISODE, UNSPECIFIED: ICD-10-CM

## 2021-04-06 DIAGNOSIS — G40.909 EPILEPSY, UNSPECIFIED, NOT INTRACTABLE, WITHOUT STATUS EPILEPTICUS: ICD-10-CM

## 2021-04-06 DIAGNOSIS — R26.89 OTHER ABNORMALITIES OF GAIT AND MOBILITY: ICD-10-CM

## 2021-06-15 ENCOUNTER — APPOINTMENT (OUTPATIENT)
Dept: NEUROLOGY | Facility: CLINIC | Age: 56
End: 2021-06-15
Payer: MEDICAID

## 2021-06-15 ENCOUNTER — OUTPATIENT (OUTPATIENT)
Dept: OUTPATIENT SERVICES | Facility: HOSPITAL | Age: 56
LOS: 1 days | Discharge: HOME | End: 2021-06-15

## 2021-06-15 ENCOUNTER — NON-APPOINTMENT (OUTPATIENT)
Age: 56
End: 2021-06-15

## 2021-06-15 VITALS — SYSTOLIC BLOOD PRESSURE: 126 MMHG | HEART RATE: 99 BPM | TEMPERATURE: 97 F | DIASTOLIC BLOOD PRESSURE: 83 MMHG

## 2021-06-15 DIAGNOSIS — Z90.710 ACQUIRED ABSENCE OF BOTH CERVIX AND UTERUS: Chronic | ICD-10-CM

## 2021-06-15 PROCEDURE — 99214 OFFICE O/P EST MOD 30 MIN: CPT | Mod: GC

## 2021-06-16 NOTE — END OF VISIT
[] : Resident [FreeTextEntry3] : Patient examined.  Reports several seizures in past few months.  Will check levels.  Continue same doses for now.  Return in 3 months.  Medications recently refilled.

## 2021-06-16 NOTE — HISTORY OF PRESENT ILLNESS
[FreeTextEntry1] : Patient is a 56 year old female with pmhx of seizure secondary to avm s/p resection in 2005 on vpa 500 - 500 - 250 mg, keppra 1500 mg q 12 hrs, trileptal 300 q 8 hrs,asthma,prediabetes EVERETTE off iron,urinary incontinence s/p pessary by Uro gyn in office for follow up. Patient has had 3 seizures in the past few months, one april and two in May. Patient states she has several issues going in on her life that cause her to be sad which may increase her rate of seizures. She requests medication refills. Continues to c/o LUE weakness. \par \par Patient states she's depressed and follows with psychiatry.

## 2021-06-16 NOTE — PHYSICAL EXAM
[Person] : oriented to person [Place] : oriented to place [Time] : oriented to time [Short Term Intact] : short term memory intact [Remote Intact] : remote memory intact [Naming Objects] : no difficulty naming common objects [Fluency] : fluency intact [Comprehension] : comprehension intact [PERRL With Normal Accommodation] : pupils were equal in size, round, reactive to light, with normal accommodation [] : no respiratory distress [Respiration, Rhythm And Depth] : normal respiratory rhythm and effort [Heart Sounds] : normal S1 and S2 [FreeTextEntry1] : Neurological Exam: \par Mental status: Awake, alert and oriented x3. Recent and remote memory intact. Naming, repetition and comprehension intact. Attention/concentration intact. No dysarthria, no aphasia. Fund of knowledge appropriate. \par Cranial nerves: Pupils equally round and reactive to light, visual fields full, no nystagmus, extraocular muscles intact, V1 through V3 intact bilaterally and symmetric, face symmetric, hearing intact to finger rub, palate elevation symmetric, tongue was midline.\par Motor: left side weakness 3/5 UE/LE.\par Sensation: Intact to light touch, proprioception, and pinprick. \par Coordination: No dysmetria on finger-to-nose and heel-to-shin. No dysdiadochokinesia.\par Reflexes: 2+ in bilateral UE/LE, downgoing toes bilaterally. (-) Russ.\par Gait: hemiparetic

## 2021-06-16 NOTE — ASSESSMENT
[FreeTextEntry1] : Ms. Segal is 54 y/o F with significant hx of seizures due to prior resection of brain mass/AVM with breakthrough, anemia, anxiety/depression in office for follow up. Reports still having left side weakness, left hand numbness. last seizure about 1-2 months ago. had 3 seizures so far in 2021.\par \par Recommendations:\par - Continue current AED regimen\par - check keppra and valproic acid trough level in AM\par - use cane\par - fall precautions\par - follow up in 3 months or PRN

## 2021-06-17 DIAGNOSIS — G40.909 EPILEPSY, UNSPECIFIED, NOT INTRACTABLE, WITHOUT STATUS EPILEPTICUS: ICD-10-CM

## 2021-06-17 LAB
25(OH)D3 SERPL-MCNC: 16 NG/ML
ALBUMIN SERPL ELPH-MCNC: 4.2 G/DL
ALP BLD-CCNC: 85 U/L
ALT SERPL-CCNC: 19 U/L
ANION GAP SERPL CALC-SCNC: 15 MMOL/L
AST SERPL-CCNC: 15 U/L
BASOPHILS # BLD AUTO: 0.01 K/UL
BASOPHILS NFR BLD AUTO: 0.2 %
BILIRUB SERPL-MCNC: 0.3 MG/DL
BUN SERPL-MCNC: 20 MG/DL
CALCIUM SERPL-MCNC: 10 MG/DL
CHLORIDE SERPL-SCNC: 101 MMOL/L
CHOLEST SERPL-MCNC: 229 MG/DL
CO2 SERPL-SCNC: 25 MMOL/L
CREAT SERPL-MCNC: 0.6 MG/DL
EOSINOPHIL # BLD AUTO: 0.08 K/UL
EOSINOPHIL NFR BLD AUTO: 1.4 %
ESTIMATED AVERAGE GLUCOSE: 128 MG/DL
GLUCOSE SERPL-MCNC: 108 MG/DL
HBA1C MFR BLD HPLC: 6.1 %
HCT VFR BLD CALC: 37.7 %
HDLC SERPL-MCNC: 63 MG/DL
HGB BLD-MCNC: 12.2 G/DL
IMM GRANULOCYTES NFR BLD AUTO: 0.3 %
LDLC SERPL CALC-MCNC: 129 MG/DL
LYMPHOCYTES # BLD AUTO: 3.22 K/UL
LYMPHOCYTES NFR BLD AUTO: 55.9 %
MAN DIFF?: NORMAL
MCHC RBC-ENTMCNC: 31.6 PG
MCHC RBC-ENTMCNC: 32.4 G/DL
MCV RBC AUTO: 97.7 FL
MONOCYTES # BLD AUTO: 0.75 K/UL
MONOCYTES NFR BLD AUTO: 13 %
NEUTROPHILS # BLD AUTO: 1.68 K/UL
NEUTROPHILS NFR BLD AUTO: 29.2 %
NONHDLC SERPL-MCNC: 166 MG/DL
PLATELET # BLD AUTO: 214 K/UL
POTASSIUM SERPL-SCNC: 4.3 MMOL/L
PROT SERPL-MCNC: 7.1 G/DL
RBC # BLD: 3.86 M/UL
RBC # FLD: 14 %
SODIUM SERPL-SCNC: 141 MMOL/L
TRIGL SERPL-MCNC: 164 MG/DL
TSH SERPL-ACNC: 2.14 UIU/ML
WBC # FLD AUTO: 5.76 K/UL

## 2021-08-02 ENCOUNTER — APPOINTMENT (OUTPATIENT)
Dept: INTERNAL MEDICINE | Facility: CLINIC | Age: 56
End: 2021-08-02
Payer: MEDICAID

## 2021-08-02 ENCOUNTER — OUTPATIENT (OUTPATIENT)
Dept: OUTPATIENT SERVICES | Facility: HOSPITAL | Age: 56
LOS: 1 days | Discharge: HOME | End: 2021-08-02

## 2021-08-02 VITALS
WEIGHT: 154 LBS | TEMPERATURE: 97.9 F | BODY MASS INDEX: 28.34 KG/M2 | HEIGHT: 62 IN | SYSTOLIC BLOOD PRESSURE: 101 MMHG | OXYGEN SATURATION: 98 % | HEART RATE: 83 BPM | DIASTOLIC BLOOD PRESSURE: 57 MMHG

## 2021-08-02 DIAGNOSIS — Z90.710 ACQUIRED ABSENCE OF BOTH CERVIX AND UTERUS: Chronic | ICD-10-CM

## 2021-08-02 PROCEDURE — 99213 OFFICE O/P EST LOW 20 MIN: CPT | Mod: GC

## 2021-08-02 NOTE — PHYSICAL EXAM
[Normal] : no joint swelling and grossly normal strength and tone [de-identified] : left sided weakness Nsaids Counseling: NSAID Counseling: I discussed with the patient that NSAIDs should be taken with food. Prolonged use of NSAIDs can result in the development of stomach ulcers.  Patient advised to stop taking NSAIDs if abdominal pain occurs.  The patient verbalized understanding of the proper use and possible adverse effects of NSAIDs.  All of the patient's questions and concerns were addressed.

## 2021-08-02 NOTE — HISTORY OF PRESENT ILLNESS
[FreeTextEntry1] : fu [de-identified] : 54yo female with pmhx of seizure secondary to AVM since 1986, asthma, prediabetes, EVERETTE off iron, urinary incontinence s/p pessary by Uro gyn here for follow up. Patient reports no complaints. she feels fine. she wants to know the result of her blood work.

## 2021-08-02 NOTE — ASSESSMENT
[FreeTextEntry1] : 54yo female with pmhx of seizure secondary to AVM since 1986, asthma, prediabetes EVERETTE off iron, urinary incontinence s/p pessary by Uro gyn here for follow up.\par \par #Unsteady gait\par - Neurologic cause, pt follows with neurology. \par - uses a cane; has a shower chair. \par - Follows up with PT. 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 \par \par \par # h/o of stress incontinence\par - follow with gynecologist\par - s/p pessary\par \par # HCM\par - mammogram last 2020 negative\par - pap smear uptodate / f/u with GYN \par  -colonoscopy:normal results in 2018 per the patient\par - fu in 6 months and prn

## 2021-08-04 DIAGNOSIS — G40.909 EPILEPSY, UNSPECIFIED, NOT INTRACTABLE, WITHOUT STATUS EPILEPTICUS: ICD-10-CM

## 2021-08-04 DIAGNOSIS — R73.03 PREDIABETES: ICD-10-CM

## 2021-08-04 DIAGNOSIS — E78.5 HYPERLIPIDEMIA, UNSPECIFIED: ICD-10-CM

## 2021-08-04 DIAGNOSIS — J45.909 UNSPECIFIED ASTHMA, UNCOMPLICATED: ICD-10-CM

## 2021-09-28 ENCOUNTER — APPOINTMENT (OUTPATIENT)
Dept: NEUROLOGY | Facility: CLINIC | Age: 56
End: 2021-09-28

## 2021-11-09 LAB
LEVETIRACETAM SERPL-MCNC: 36.5 UG/ML
VALPROATE SERPL-MCNC: 97 UG/ML

## 2021-12-07 ENCOUNTER — TRANSCRIPTION ENCOUNTER (OUTPATIENT)
Age: 56
End: 2021-12-07

## 2021-12-10 ENCOUNTER — NON-APPOINTMENT (OUTPATIENT)
Age: 56
End: 2021-12-10

## 2021-12-17 ENCOUNTER — NON-APPOINTMENT (OUTPATIENT)
Age: 56
End: 2021-12-17

## 2021-12-17 ENCOUNTER — APPOINTMENT (OUTPATIENT)
Dept: INTERNAL MEDICINE | Facility: CLINIC | Age: 56
End: 2021-12-17
Payer: MEDICAID

## 2021-12-17 ENCOUNTER — OUTPATIENT (OUTPATIENT)
Dept: OUTPATIENT SERVICES | Facility: HOSPITAL | Age: 56
LOS: 1 days | Discharge: HOME | End: 2021-12-17

## 2021-12-17 VITALS
HEIGHT: 62 IN | HEART RATE: 85 BPM | OXYGEN SATURATION: 95 % | SYSTOLIC BLOOD PRESSURE: 119 MMHG | WEIGHT: 149 LBS | BODY MASS INDEX: 27.42 KG/M2 | DIASTOLIC BLOOD PRESSURE: 79 MMHG

## 2021-12-17 DIAGNOSIS — H60.90 UNSPECIFIED OTITIS EXTERNA, UNSPECIFIED EAR: ICD-10-CM

## 2021-12-17 DIAGNOSIS — Z90.710 ACQUIRED ABSENCE OF BOTH CERVIX AND UTERUS: Chronic | ICD-10-CM

## 2021-12-17 PROCEDURE — 99213 OFFICE O/P EST LOW 20 MIN: CPT | Mod: GC

## 2021-12-17 NOTE — HISTORY OF PRESENT ILLNESS
[de-identified] : 55 year old female known to have seizure secondary to AVM since 1986, asthma, prediabetes, EVERETTE off iron, urinary incontinence s/p pessary by Uro gyn, presented for follow up.\par Patient has no complaints except for LEft ear pain s/p multiple q-tip insertions.

## 2021-12-17 NOTE — ASSESSMENT
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 \par \par # h/o of stress incontinence\par - follow with gynecologist\par \par #Left ear otitis externa\par - Cortisporin for 10 days\par - Advised against the use of q-tips\par \par # HCM\par - mammogram last 2020 negative\par - pap smear uptodate / f/u with GYN \par  -colonoscopy:normal results in 2018 per the patient\par - Repeat labs\par - Follow up in 6 months and prn\par \par

## 2021-12-17 NOTE — PHYSICAL EXAM
[Normal] : no joint swelling and grossly normal strength and tone [de-identified] : Otitis externa - ear canal redness

## 2021-12-20 ENCOUNTER — NON-APPOINTMENT (OUTPATIENT)
Age: 56
End: 2021-12-20

## 2021-12-20 DIAGNOSIS — H60.90 UNSPECIFIED OTITIS EXTERNA, UNSPECIFIED EAR: ICD-10-CM

## 2021-12-20 DIAGNOSIS — J45.909 UNSPECIFIED ASTHMA, UNCOMPLICATED: ICD-10-CM

## 2021-12-20 DIAGNOSIS — R73.03 PREDIABETES: ICD-10-CM

## 2021-12-20 DIAGNOSIS — E78.5 HYPERLIPIDEMIA, UNSPECIFIED: ICD-10-CM

## 2021-12-20 DIAGNOSIS — D64.9 ANEMIA, UNSPECIFIED: ICD-10-CM

## 2021-12-20 DIAGNOSIS — G40.909 EPILEPSY, UNSPECIFIED, NOT INTRACTABLE, WITHOUT STATUS EPILEPTICUS: ICD-10-CM

## 2022-03-01 ENCOUNTER — APPOINTMENT (OUTPATIENT)
Dept: NEUROLOGY | Facility: CLINIC | Age: 57
End: 2022-03-01
Payer: MEDICAID

## 2022-03-01 ENCOUNTER — NON-APPOINTMENT (OUTPATIENT)
Age: 57
End: 2022-03-01

## 2022-03-01 ENCOUNTER — OUTPATIENT (OUTPATIENT)
Dept: OUTPATIENT SERVICES | Facility: HOSPITAL | Age: 57
LOS: 1 days | Discharge: HOME | End: 2022-03-01

## 2022-03-01 VITALS
HEART RATE: 90 BPM | TEMPERATURE: 97.2 F | OXYGEN SATURATION: 95 % | WEIGHT: 144 LBS | SYSTOLIC BLOOD PRESSURE: 109 MMHG | HEIGHT: 62 IN | BODY MASS INDEX: 26.5 KG/M2 | DIASTOLIC BLOOD PRESSURE: 75 MMHG

## 2022-03-01 DIAGNOSIS — Z90.710 ACQUIRED ABSENCE OF BOTH CERVIX AND UTERUS: Chronic | ICD-10-CM

## 2022-03-01 PROCEDURE — 99214 OFFICE O/P EST MOD 30 MIN: CPT | Mod: GC

## 2022-03-01 NOTE — END OF VISIT
[] : Resident [FreeTextEntry3] : Patient examined, agree with high energy and concern for nohelia.  I emailed PCP with concern, alternative could be quetiapine.\par Seizures overall stable.  Continue triple therapy and check trough levels.  Refills provided.  Patient to return in 6 months.\par Post craniotomy headaches, may use tylenol prn.

## 2022-03-01 NOTE — ASSESSMENT
[FreeTextEntry1] : Ms. Segal is 56 y/o F with significant hx of seizures due to prior resection of brain mass/AVM with breakthrough, anemia, anxiety/depression in office for follow up. Reports still having left side weakness, both UE and LE. \par \par Recommendations:\par #Seizures. currently controlled  \par - Continue current AED regimen - reportedly has 3 months, renewing for 3 additional months. \par - check keppra and valproic acid trough level in AM\par - BMP\par - continue cane\par - fall precautions\par \par #Post-craniotomy head pain. Tenderness to palpation. \par - tylenol PRN \par \par #Bipolar disorder with "activated" presentation while on SSRI\par - Messaged [facility] primary care to consider discontinuation of SSRI in light of bipolar disorder.\par -  Psychiatry referral recommended and offered, but declined.  \par - patient presents with high energy, distractibility, decreased sleep need\par - No SI or HI\par - concern for patient's reported hx bipolar disorder on ssri, not seeing psychiatrist. \par \par #RTC \par - follow up in 6 months or PRN\par

## 2022-03-01 NOTE — HISTORY OF PRESENT ILLNESS
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 Patient report that she hasn't had any seizures since last visit in July 2021. Denies acute side effects from medication. She requests medication refills. Continues to c/o LUE weakness as well as LLE weakness. \par \par Patient states the has a reported history of bipolar disorder and ADHD, and has been taking sertraline 50 mg from primary care in [facility]. Patient states that she did not follow up with psychiatry in the past. Per patient's , patient has been irritable recently. Patient states that she typically sleeps midnight-5:30 am, then is awake during daytime, then sleeps in the afternoon - reportedly has been ongoing for years. \par \par Patient reports that she has been experiencing left leg weakness since 2005. Patient reports a mild headache in the posterior scalp, which is where she indicates she had a resection. Unsteady gait because of left leg - particularly ankle. Patient endorses Left sided weakness UE and LE but longstanding. Describes headache in  occipital region - indicates location of operation since 2005, worsened with light and in mornings. Takes tylenol, which patient reports is helpful. \par

## 2022-03-22 ENCOUNTER — NON-APPOINTMENT (OUTPATIENT)
Age: 57
End: 2022-03-22

## 2022-03-22 LAB
ALBUMIN SERPL ELPH-MCNC: 4.7 G/DL
ALP BLD-CCNC: 99 U/L
ALT SERPL-CCNC: 12 U/L
ANION GAP SERPL CALC-SCNC: 16 MMOL/L
AST SERPL-CCNC: 13 U/L
BILIRUB SERPL-MCNC: <0.2 MG/DL
BUN SERPL-MCNC: 24 MG/DL
CALCIUM SERPL-MCNC: 10.4 MG/DL
CHLORIDE SERPL-SCNC: 102 MMOL/L
CO2 SERPL-SCNC: 24 MMOL/L
CREAT SERPL-MCNC: 0.7 MG/DL
EGFR: 102 ML/MIN/1.73M2
GLUCOSE SERPL-MCNC: 97 MG/DL
LEVETIRACETAM SERPL-MCNC: 36.9 UG/ML
OXCARBAZEPINE SERPL-MCNC: 17 UG/ML
POTASSIUM SERPL-SCNC: 4.4 MMOL/L
PROT SERPL-MCNC: 7.9 G/DL
SODIUM SERPL-SCNC: 142 MMOL/L
VALPROATE SERPL-MCNC: 107 UG/ML

## 2022-05-02 LAB
ALBUMIN SERPL ELPH-MCNC: 4.4 G/DL
ALP BLD-CCNC: 79 U/L
ALT SERPL-CCNC: 14 U/L
ANION GAP SERPL CALC-SCNC: 14 MMOL/L
AST SERPL-CCNC: 13 U/L
BASOPHILS # BLD AUTO: 0.01 K/UL
BASOPHILS NFR BLD AUTO: 0.2 %
BILIRUB SERPL-MCNC: <0.2 MG/DL
BUN SERPL-MCNC: 20 MG/DL
CALCIUM SERPL-MCNC: 9.7 MG/DL
CHLORIDE SERPL-SCNC: 102 MMOL/L
CO2 SERPL-SCNC: 26 MMOL/L
CREAT SERPL-MCNC: 0.6 MG/DL
EGFR: 105 ML/MIN/1.73M2
EOSINOPHIL # BLD AUTO: 0.08 K/UL
EOSINOPHIL NFR BLD AUTO: 1.5 %
ESTIMATED AVERAGE GLUCOSE: 120 MG/DL
GLUCOSE SERPL-MCNC: 107 MG/DL
HBA1C MFR BLD HPLC: 5.8 %
HCT VFR BLD CALC: 38.9 %
HGB BLD-MCNC: 12.3 G/DL
IMM GRANULOCYTES NFR BLD AUTO: 0.2 %
LYMPHOCYTES # BLD AUTO: 3.42 K/UL
LYMPHOCYTES NFR BLD AUTO: 63.3 %
MAN DIFF?: NORMAL
MCHC RBC-ENTMCNC: 31.6 G/DL
MCHC RBC-ENTMCNC: 31.9 PG
MCV RBC AUTO: 101 FL
MONOCYTES # BLD AUTO: 0.63 K/UL
MONOCYTES NFR BLD AUTO: 11.7 %
NEUTROPHILS # BLD AUTO: 1.25 K/UL
NEUTROPHILS NFR BLD AUTO: 23.1 %
PLATELET # BLD AUTO: 220 K/UL
POTASSIUM SERPL-SCNC: 4.5 MMOL/L
PROT SERPL-MCNC: 7.3 G/DL
RBC # BLD: 3.85 M/UL
RBC # FLD: 13.9 %
SODIUM SERPL-SCNC: 142 MMOL/L
WBC # FLD AUTO: 5.4 K/UL

## 2022-06-10 ENCOUNTER — OUTPATIENT (OUTPATIENT)
Dept: OUTPATIENT SERVICES | Facility: HOSPITAL | Age: 57
LOS: 1 days | Discharge: HOME | End: 2022-06-10

## 2022-06-10 ENCOUNTER — APPOINTMENT (OUTPATIENT)
Dept: INTERNAL MEDICINE | Facility: CLINIC | Age: 57
End: 2022-06-10
Payer: MEDICAID

## 2022-06-10 ENCOUNTER — NON-APPOINTMENT (OUTPATIENT)
Age: 57
End: 2022-06-10

## 2022-06-10 VITALS
DIASTOLIC BLOOD PRESSURE: 69 MMHG | WEIGHT: 144 LBS | SYSTOLIC BLOOD PRESSURE: 129 MMHG | TEMPERATURE: 96.5 F | HEART RATE: 65 BPM | HEIGHT: 62 IN | BODY MASS INDEX: 26.5 KG/M2 | OXYGEN SATURATION: 96 %

## 2022-06-10 DIAGNOSIS — Z90.710 ACQUIRED ABSENCE OF BOTH CERVIX AND UTERUS: Chronic | ICD-10-CM

## 2022-06-10 PROCEDURE — 99214 OFFICE O/P EST MOD 30 MIN: CPT | Mod: GC

## 2022-06-10 NOTE — HISTORY OF PRESENT ILLNESS
[FreeTextEntry1] : Follow up [de-identified] : 57 year old female, known to have seizure secondary to AVM since 1986, asthma, prediabetes, EVERETTE off iron, urinary incontinence s/p pessary by Uro gyn, presented for follow up.\par SHe recently saw Dr. Sampson (Neurology) and patient was having a manic episode while on SSRI. \par

## 2022-06-10 NOTE — ASSESSMENT
[FreeTextEntry1] : 57 year old female, known to have seizure secondary to AVM since 1986, asthma, prediabetes, EVERETTE off iron, urinary incontinence s/p pessary by Uro gyn, presented for follow up.\par \par # Brain mass and AVM s/p resection - Mild left hemimotor syndrome\par # Seizure disorder\par - on anti seizure medication (Keppra, valproic acid, depakote, and oxcarbazepine)\par - on levocarnitine due to high ammonia levels likely secondary to side effect of valproate \par - follows with neuro\par \par #Bipolar disorder while on SSRI\par - Psych referral - patient reluctant \par \par # asthma\par  -well controlled\par  -c/w same bronchodilators albuterol/ipratropium and symbicort.\par \par # prediabetes \par  -on dietary control\par \par # DLD\par - Advised lifestyle modification and dietary modifications. \par \par # h/o of stress incontinence\par - follow with gynecologist\par \par \par # HCM\par - mammogram last 2020 negative\par - pap smear uptodate / f/u with GYN \par  -colonoscopy:normal results in 2018 per the patient\par - Repeat labs\par - Follow up in 6 months and prn

## 2022-06-13 DIAGNOSIS — R73.03 PREDIABETES: ICD-10-CM

## 2022-06-13 DIAGNOSIS — F31.10 BIPOLAR DISORDER, CURRENT EPISODE MANIC WITHOUT PSYCHOTIC FEATURES, UNSPECIFIED: ICD-10-CM

## 2022-06-13 DIAGNOSIS — J45.909 UNSPECIFIED ASTHMA, UNCOMPLICATED: ICD-10-CM

## 2022-06-14 ENCOUNTER — NON-APPOINTMENT (OUTPATIENT)
Age: 57
End: 2022-06-14

## 2022-06-14 ENCOUNTER — APPOINTMENT (OUTPATIENT)
Dept: PSYCHIATRY | Facility: CLINIC | Age: 57
End: 2022-06-14
Payer: MEDICAID

## 2022-06-14 ENCOUNTER — OUTPATIENT (OUTPATIENT)
Dept: OUTPATIENT SERVICES | Facility: HOSPITAL | Age: 57
LOS: 1 days | Discharge: HOME | End: 2022-06-14

## 2022-06-14 VITALS — HEIGHT: 61 IN | BODY MASS INDEX: 26.81 KG/M2 | WEIGHT: 142 LBS

## 2022-06-14 DIAGNOSIS — Z90.710 ACQUIRED ABSENCE OF BOTH CERVIX AND UTERUS: Chronic | ICD-10-CM

## 2022-06-14 DIAGNOSIS — F41.9 ANXIETY DISORDER, UNSPECIFIED: ICD-10-CM

## 2022-06-14 PROCEDURE — 90792 PSYCH DIAG EVAL W/MED SRVCS: CPT

## 2022-06-15 ENCOUNTER — APPOINTMENT (OUTPATIENT)
Dept: NUTRITION | Facility: CLINIC | Age: 57
End: 2022-06-15

## 2022-06-15 ENCOUNTER — NON-APPOINTMENT (OUTPATIENT)
Age: 57
End: 2022-06-15

## 2022-06-15 ENCOUNTER — OUTPATIENT (OUTPATIENT)
Dept: OUTPATIENT SERVICES | Facility: HOSPITAL | Age: 57
LOS: 1 days | Discharge: HOME | End: 2022-06-15

## 2022-06-15 DIAGNOSIS — Z90.710 ACQUIRED ABSENCE OF BOTH CERVIX AND UTERUS: Chronic | ICD-10-CM

## 2022-06-21 DIAGNOSIS — E78.5 HYPERLIPIDEMIA, UNSPECIFIED: ICD-10-CM

## 2022-07-01 ENCOUNTER — APPOINTMENT (OUTPATIENT)
Dept: PSYCHIATRY | Facility: CLINIC | Age: 57
End: 2022-07-01

## 2022-07-01 PROCEDURE — 99214 OFFICE O/P EST MOD 30 MIN: CPT | Mod: 95

## 2022-07-07 ENCOUNTER — NON-APPOINTMENT (OUTPATIENT)
Age: 57
End: 2022-07-07

## 2022-08-25 ENCOUNTER — NON-APPOINTMENT (OUTPATIENT)
Age: 57
End: 2022-08-25

## 2022-08-25 ENCOUNTER — OUTPATIENT (OUTPATIENT)
Dept: OUTPATIENT SERVICES | Facility: HOSPITAL | Age: 57
LOS: 1 days | Discharge: HOME | End: 2022-08-25

## 2022-08-25 ENCOUNTER — APPOINTMENT (OUTPATIENT)
Dept: PSYCHIATRY | Facility: CLINIC | Age: 57
End: 2022-08-25

## 2022-08-25 DIAGNOSIS — Z90.710 ACQUIRED ABSENCE OF BOTH CERVIX AND UTERUS: Chronic | ICD-10-CM

## 2022-08-25 DIAGNOSIS — F41.1 GENERALIZED ANXIETY DISORDER: ICD-10-CM

## 2022-08-25 PROCEDURE — 99214 OFFICE O/P EST MOD 30 MIN: CPT | Mod: 95

## 2022-09-06 ENCOUNTER — OUTPATIENT (OUTPATIENT)
Dept: OUTPATIENT SERVICES | Facility: HOSPITAL | Age: 57
LOS: 1 days | Discharge: HOME | End: 2022-09-06

## 2022-09-06 ENCOUNTER — APPOINTMENT (OUTPATIENT)
Dept: NEUROLOGY | Facility: CLINIC | Age: 57
End: 2022-09-06

## 2022-09-06 VITALS
HEIGHT: 61 IN | TEMPERATURE: 98.4 F | BODY MASS INDEX: 27.56 KG/M2 | SYSTOLIC BLOOD PRESSURE: 120 MMHG | OXYGEN SATURATION: 98 % | HEART RATE: 65 BPM | DIASTOLIC BLOOD PRESSURE: 79 MMHG | WEIGHT: 146 LBS

## 2022-09-06 DIAGNOSIS — G40.909 EPILEPSY, UNSPECIFIED, NOT INTRACTABLE, WITHOUT STATUS EPILEPTICUS: ICD-10-CM

## 2022-09-06 DIAGNOSIS — Z86.011 PERSONAL HISTORY OF BENIGN NEOPLASM OF THE BRAIN: ICD-10-CM

## 2022-09-06 DIAGNOSIS — Z90.710 ACQUIRED ABSENCE OF BOTH CERVIX AND UTERUS: Chronic | ICD-10-CM

## 2022-09-06 PROCEDURE — 99214 OFFICE O/P EST MOD 30 MIN: CPT | Mod: GC

## 2022-09-06 RX ORDER — VALPROIC ACID 250 MG/1
250 CAPSULE, LIQUID FILLED ORAL
Qty: 90 | Refills: 3 | Status: COMPLETED | COMMUNITY
Start: 2019-05-14 | End: 2022-09-06

## 2022-09-06 NOTE — HISTORY OF PRESENT ILLNESS
[FreeTextEntry1] :  56 year old female with pmhx of seizure secondary to AVM s/p resection in 2005 on vpa 500 BID, Keppra 1500 mg q 12 hrs, Trileptal 300 q 8 hrs, here in the office for follow up. Patient report that she hasn't had any seizures since the prior two visits. She tries to be compliant with her medication, but has skipped few doses.\par Patient has been off Zoloft since August, and currently on Hydroxyzine that was started by  team. She complains of feeling tired, and of feeling thirsty and feels her mouth is dry.\par \par Patient reports that she has been experiencing left leg weakness since 2005. Unsteady gait because of left leg - particularly ankle. Patient endorses Left sided weakness UE and LE but longstanding. Currently doing PT twice/ week. \par

## 2022-09-06 NOTE — PHYSICAL EXAM
[FreeTextEntry1] : Neurological Exam: \par Mental status: Awake, alert and oriented x3. Recent and remote memory intact. Naming, repetition and comprehension intact. Attention/concentration intact. Speech mildly dysarthric, no aphasia. Fund of knowledge appropriate. \par Cranial nerves: Pupils equally round and reactive to light, visual fields full, no nystagmus, extraocular muscles intact, V1 through V3 intact bilaterally and symmetric, face symmetric, hearing intact to finger rub, palate elevation symmetric, tongue was midline.\par Motor: left side weakness 3+/5 UE/LE. Postural tremors\par Coordination: No dysmetria on finger-to-nose and heel-to-shin. No dysdiadochokinesia.\par Reflexes: 2+ in bilateral UE/LE, downgoing toes bilaterally. (-) Russ.\par Gait: hemiparetic. \par \par Orientation: oriented to person, oriented to place and oriented to time. \par Memory: short term memory intact and remote memory intact. \par Language: no difficulty naming common objects, fluency intact and comprehension intact.  \par Eyes: pupils were equal in size, round, reactive to light, with normal accommodation. \par Pulmonary: no respiratory distress \par \par

## 2022-09-06 NOTE — ASSESSMENT
[FreeTextEntry1] : 58 y/o F with significant hx of seizures due to prior resection of brain mass/AVM, anemia, MELITA, here for follow up. Reports still having left side weakness, both UE and LE. No breakthrough seizures\par \par Plan:\par #R Parietal AVM s/p Craniotomy\par #Seizures. currently controlled \par - Continue current AED regimen -Keppra, Depakote, and oxcarbazepine\par - check AED trough level\par - BMP, CMP, Ammonia level\par - MRI 2019: stable post op finding and R cystic lesion that is stable\par - C/w PT\par - Follow up in 4-5 months\par \par \par \par \par . \par

## 2022-09-13 ENCOUNTER — NON-APPOINTMENT (OUTPATIENT)
Age: 57
End: 2022-09-13

## 2022-09-14 ENCOUNTER — APPOINTMENT (OUTPATIENT)
Dept: NUTRITION | Facility: CLINIC | Age: 57
End: 2022-09-14

## 2022-09-14 ENCOUNTER — NON-APPOINTMENT (OUTPATIENT)
Age: 57
End: 2022-09-14

## 2022-09-14 ENCOUNTER — OUTPATIENT (OUTPATIENT)
Dept: OUTPATIENT SERVICES | Facility: HOSPITAL | Age: 57
LOS: 1 days | Discharge: HOME | End: 2022-09-14

## 2022-09-14 ENCOUNTER — EMERGENCY (EMERGENCY)
Facility: HOSPITAL | Age: 57
LOS: 0 days | Discharge: HOME | End: 2022-09-14
Attending: EMERGENCY MEDICINE | Admitting: EMERGENCY MEDICINE

## 2022-09-14 ENCOUNTER — APPOINTMENT (OUTPATIENT)
Dept: INTERNAL MEDICINE | Facility: CLINIC | Age: 57
End: 2022-09-14

## 2022-09-14 VITALS
WEIGHT: 147 LBS | HEART RATE: 86 BPM | HEIGHT: 61 IN | OXYGEN SATURATION: 98 % | SYSTOLIC BLOOD PRESSURE: 120 MMHG | TEMPERATURE: 96 F | DIASTOLIC BLOOD PRESSURE: 67 MMHG | BODY MASS INDEX: 27.75 KG/M2

## 2022-09-14 VITALS
SYSTOLIC BLOOD PRESSURE: 176 MMHG | TEMPERATURE: 97 F | HEART RATE: 85 BPM | HEIGHT: 61 IN | DIASTOLIC BLOOD PRESSURE: 78 MMHG | RESPIRATION RATE: 16 BRPM | OXYGEN SATURATION: 99 %

## 2022-09-14 DIAGNOSIS — E66.3 OVERWEIGHT: ICD-10-CM

## 2022-09-14 DIAGNOSIS — Z90.710 ACQUIRED ABSENCE OF BOTH CERVIX AND UTERUS: Chronic | ICD-10-CM
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
## 2022-09-14 DIAGNOSIS — S09.90XA UNSPECIFIED INJURY OF HEAD, INITIAL ENCOUNTER: ICD-10-CM
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
## 2022-09-14 DIAGNOSIS — F41.9 ANXIETY DISORDER, UNSPECIFIED: ICD-10-CM

## 2022-09-14 DIAGNOSIS — Z23 ENCOUNTER FOR IMMUNIZATION: ICD-10-CM

## 2022-09-14 DIAGNOSIS — J45.909 UNSPECIFIED ASTHMA, UNCOMPLICATED: ICD-10-CM

## 2022-09-14 DIAGNOSIS — R51.9 HEADACHE, UNSPECIFIED: ICD-10-CM

## 2022-09-14 DIAGNOSIS — Z90.710 ACQUIRED ABSENCE OF BOTH CERVIX AND UTERUS: ICD-10-CM

## 2022-09-14 PROCEDURE — 99213 OFFICE O/P EST LOW 20 MIN: CPT | Mod: GC

## 2022-09-14 PROCEDURE — 70450 CT HEAD/BRAIN W/O DYE: CPT | Mod: 26,MA

## 2022-09-14 PROCEDURE — 99284 EMERGENCY DEPT VISIT MOD MDM: CPT

## 2022-09-14 RX ORDER — ACETAMINOPHEN 500 MG
975 TABLET ORAL ONCE
Refills: 0 | Status: COMPLETED | OUTPATIENT
Start: 2022-09-14 | End: 2022-09-14

## 2022-09-14 RX ORDER — TETANUS TOXOID, REDUCED DIPHTHERIA TOXOID AND ACELLULAR PERTUSSIS VACCINE, ADSORBED 5; 2.5; 8; 8; 2.5 [IU]/.5ML; [IU]/.5ML; UG/.5ML; UG/.5ML; UG/.5ML
0.5 SUSPENSION INTRAMUSCULAR ONCE
Refills: 0 | Status: COMPLETED | OUTPATIENT
Start: 2022-09-14 | End: 2022-09-14

## 2022-09-14 RX ORDER — OXCARBAZEPINE 300 MG/1
300 TABLET, FILM COATED ORAL ONCE
Refills: 0 | Status: COMPLETED | OUTPATIENT
Start: 2022-09-14 | End: 2022-09-14

## 2022-09-14 RX ADMIN — Medication 975 MILLIGRAM(S): at 17:59

## 2022-09-14 RX ADMIN — TETANUS TOXOID, REDUCED DIPHTHERIA TOXOID AND ACELLULAR PERTUSSIS VACCINE, ADSORBED 0.5 MILLILITER(S): 5; 2.5; 8; 8; 2.5 SUSPENSION INTRAMUSCULAR at 19:30

## 2022-09-14 RX ADMIN — Medication 975 MILLIGRAM(S): at 18:30

## 2022-09-14 RX ADMIN — OXCARBAZEPINE 300 MILLIGRAM(S): 300 TABLET, FILM COATED ORAL at 17:59

## 2022-09-14 NOTE — ED PROVIDER NOTE - BIRTH SEX
Subjective:      Patient ID: Donna Ruth is a 12 y.o. male. HPI Informant: Mom- Lillian    Concerns:  Finished PT and OT in Feb or Mar.     Recently saw neurology with concern for staring spells followed by migraines. Started on trileptal. Dose adjusting for the next several weeks. Normal EEG. Urology follow up this summer. 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 Getting allergy shots once a week (2 shots). Interval history: no significant illnesses, emergency department visits, surgeries, or changes to family history. Diet History:  Milk? no   Amount of milk? NA ounces per day  Juice? yes   Amount of juice? 8  ounces per day  Intolerances? no  Appetite? excellent   Meats? many   Fruits? many   Vegetables? many    Sleep History:  Sleeps in:  Own bed? yes    With parents/siblings? no    All night? yes    Problems? yes, mom is concerned with the migraines and belly pain    Developmental Screening:    Dresses self? Yes   Draws a person? No   Counts fingers? unknown   Balances foot-4 sec? unknown   All speech understandable? No,he's in speech therapy. Turns pages 1 at a time; retells familiar story?unknown   Exercise/extracurricular activities: soccer and tball    Behavioral Assessment:   Does patient attend , kindergarden or ? Where? yes, [employer] and  [employer]   Does patient get along with friends well? yes   Does patient listen to the teacher and follow instructions? yes   Does patient seem restless or impulsive? no   Does patient have outburst and lose temper? yes   Have you been concerned about your child's behavior? no      Medications: All medications have been reviewed. Currently is  taking over-the-counter medication(s). Medication(s) currently being used have been reviewed and added to the medication list.    Review of Systems   All other systems reviewed and are negative.       Objective:   Physical Exam
Female

## 2022-09-14 NOTE — ED ADULT NURSE NOTE - NS_ED_NURSE_TEACHING_TOPIC_ED_A_ED
DATE OF SERVICE:  6/13/2019     CHIEF COMPLAINT: Follow up and possible continuation of treatment with Nivolumab for renal cell carcinoma    PRIMARY CARE PHYSICIAN: Dr. Carlo Jackson DO     DIAGNOSIS:  1. Renal cell carcinoma of right kidney, Stage IV (T3c, N0, M1).    ONCOLOGY GENOMIC PROFILE:  1. Broad Molecular Profiling -- Not Performed    ORAL CHEMOTHERAPY: Adherence and toxicity reviewed    HISTORY OF PRESENT ILLNESS:   1. Diagnosed 8/3/2013 after presenting to ED with pedal edema, abdominal discomfort and mild distention along with some nausea for the last 2-3 weeks.  2. CT abdomen / pelvis revealed large malignant right renal mass with extension along the renal vein into IVC, and extension cephalad to just short of the right atrium, and caudally within the infrarenal IVC, to the iliac and femoral vessels.  There was a small pleural-based density with some pleural fluid at the right lung base, and apparent multiple small liver cysts.  3. CT chest revealed tumor thrombus in RML/RLL, a 2cm density  in the right lower lobe, pulmonary infarction versus metastasis, extensive tumor thrombus in IVC and right renal mass lesion highly suspicious for renal cell carcinoma.   4. Bone scan without skeletal metastases.  5. Brain MRI negative for intracranial metastases  6.  CT guided renal biopsy 8/6/2013:  Pathology preliminary report Taya grade 2 clear cell renal cell carcinoma.  7. Clinical stage IV, [id number]  [facility]. [facility] and underwent exploratory laparotomy, right adrenalectomy and right nephrectomy, tumor thrombectomy including a cardiopulmonary bypass 8/30/13   9. Pathology revealed: Liver, biopsy: Subcapsular cyst, benign; Cyst, ovary, excision: Serous cystadenoma; Kidney, right, nephrectomy: Renal cell carcinoma, clear cell type without sarcomatoid features, 6.8 x 6.0 x 5.4 cm, unifocal, Taya nuclear grade 2 (out of 4), with extensive necrosis; Lymphovascular invasion is present ; Tumor  thrombus present at vein margin; Ureteral margin, negative for tumor; Uninvolved renal parenchyma without pathologic abnormality; Pathologic stage (AJCC, 2010): Thrombus, inferior vena cava, removal: Renal cell carcinoma, clear cell type, Taya nuclear grade 2 (out of 4); Thrombus, distal inferior vena cava wall, removal: Thrombus with organization, no tumor seen; Thrombus, inferior vena cava wall, removal: Thrombus with organization, no tumor seen; Thrombus, inferior vena cava wall, removal; Thrombus with organization, no tumor seen; Gonadal vein ostea, biopsy: Normal vein; Lymph nodes, \"interaortal caval\", excision: Five benign lymph nodes, negative for tumor (0/5);  Adrenal gland, right, adrenalectomy: Benign adrenal gland, negative for tumor; Perihilar tissue, right, biopsy: Three lymph nodes, negative for tumor (0/3); Two ganglia; Adrenal cortical rest; Lung, right middle lobe, wedge resection: Lung parenchyma with infarct (2 cm), Negative for malignancy.   10. Pathologic staging:  [id number], stage III  11. Repeat CT scan with evidence of stable thrombus in pulmonary lobar artery, consistent with probable tumor thrombus  12. Started on Afinitor 11/2013  13. 1/30/14 Imaging study for response assessment indicates progressive metastatic disease in lungs bilaterally  14. Afinitor discontinued; Votrient initiated 2/7/2014  15. Votrient held due to diarrhea  16. Votrient resumed 3/18/14 at 25% dose reduction.  17.  Repeat imaging 4/2014 with some reduction in metastatic burden  18. Votrient discontinued due to toxicity (GI); Axitinib started ~4/28/2014  19. Repeat imaging study 7/2014 stable overall.  20. Repeat CT 10/2014 with evidence of progressive pulmonary metastases; Axitinib discontinued  21. Referred to Dr. Orr; high dose IL-2 therapy x 2 without response  22. Nivolumab started 1/2015. Tolerating well.  23. CT CAP on 11/7/2018 revealed no evidence of recurrence  24. CT CAP on 3/4/2019 showed no  evidence of recurrence    SUBJECTIVE: Patient presents to the clinic for follow up and possible continuation of treatment. She has been feeling \"perfect\". Patient has no new specific symptoms to report. She notes no diarrhea, rash, change in breathing, or joint pain. Patient has been trying to stay active although wish she can do more.     Patient recently returned from her trip from [place] and went bike riding in [place]. Review of systems as noted below.    REVIEW OF SYSTEMS:  A comprehensive nursing assessment addressing the side-effects of chemotherapy was performed and the patient reports the following:    ECO - Fully active, able to carry on all predisease activities without restrictions.    Nausea: NO  Vomiting: NO  Fever: NO  Chills: NO  Other signs of infection: NO  Bleeding: NO  Mucositis: NO  Diarrhea: NO  Constipation: NO  Anorexia: NO  Dysuria: NO  Urinary Bleeding: NO  Cough: NO  Shortness of Breath: NO  Fatigue/Weakness: NO  Numbness/Tingling: NO  Other Neuropathies: NO  Edema: NO  Rash: NO  Hand/Foot Syndrome: NO  Anxiety/Depression/Insomnia: NO  Pain: NO    PAST MEDICAL HISTORY    Depression                                                    Seasonal allergies                                            IVC thrombosis (CMS/HCC)                        2013      Renal cell carcinoma (CMS/HCC)                  2013      Glaucoma (increased eye pressure)                             Hyperlipidemia                                                Hypothyroidism                                                GLADIS (obstructive sleep apnea)                   2017     Concussion                                                 SOCIAL HISTORY    Tobacco Use: Quit          Packs/Day: .5    Years: 14         Quit date: 1990    Alcohol Use: Yes           2.4 - 3 oz/week       Comment: 3 beers / wine a week     ALLERGIES  ALLERGIES: no known allergies.    MEDICATIONS  Current Outpatient  Medications   Medication Sig Dispense Refill   • fluticasone (FLONASE) 50 MCG/ACT nasal spray SHAKE LIQUID AND USE 2 SPRAYS IN EACH NOSTRIL DAILY 16 mL 5   • atorvastatin (LIPITOR) 10 MG tablet TAKE 1 TABLET BY MOUTH EVERY NIGHT 90 tablet 2   • levothyroxine (SYNTHROID, LEVOTHROID) 25 MCG tablet TAKE 1 TABLET BY MOUTH DAILY 90 tablet 3   • sertraline (ZOLOFT) 100 MG tablet Take 1 tablet by mouth daily. 90 tablet 2   • albuterol (PROVENTIL HFA) 108 (90 Base) MCG/ACT inhaler Inhale 2 puffs into the lungs every 4 hours as needed for Shortness of Breath or Wheezing.     • Omega 3 1000 MG capsule Take 2,000 mg by mouth daily.     • VITAMIN D, CHOLECALCIFEROL, PO Take 1 tablet by mouth daily.     • B Complex Vitamins (VITAMIN B COMPLEX PO) Take 1 tablet by mouth daily.     • aspirin 81 MG chewable tablet Chew 81 mg by mouth daily.     • azithromycin (ZITHROMAX) 250 MG tablet 2 tabs po 1 hour before procedure 2 tablet 0   • sertraline (ZOLOFT) 100 MG tablet TAKE 1 TABLET BY MOUTH DAILY 90 tablet 3   • amoxicillin (AMOXIL) 500 MG capsule Take 4 tablets 1 hour prior to dental procedures 16 capsule 0     No current facility-administered medications for this visit.      PHYSICAL EXAMINATION  Vitals:    06/13/19 1208   BP: 125/59   Pulse: (!) 47   Resp: 18   Temp: 97.7 °F (36.5 °C)   TempSrc: Oral   SpO2: 97%   Weight: 87.5 kg   Height: 5' 4\" (1.626 m)   PainSc:  0   ECOG PFS of 1  Patient is alert and oriented to time place and person, in no acute distress.  Cranium: Normocephalic, atraumatic. No conjunctival pallor or icterus. CAROL ANN,   Oral Cavity: Lips, tongue, oral mucosa display no lesions or ulcers  Lymph nodes: No cervical, supraclavicular or axillary lymphadenopathy.  Chest: Clear to auscultation, no dullness to percussion.  Heart: Regular rate and rhythm, no murmurs, apical impulse intact.  Abdomen: Soft, nontender, nondistended, no hepatosplenomegaly. Bowel sounds normoactive.  Extremities: No clubbing cyanosis or  edema.  Motor strength of 5 out of 5 in all extremities. Sensory exam intact to light touch. DTRs 2+  Psychiatric: Normal mood and affect. There is good understanding of the conversation and asks relevant questions.    LABORATORY DATA  WBC (K/mcL)   Date Value   06/13/2019 3.5 (L)     RBC (mil/mcL)   Date Value   06/13/2019 4.42     HCT (%)   Date Value   06/13/2019 42.3     HGB (g/dL)   Date Value   06/13/2019 13.6     PLT (K/mcL)   Date Value   06/13/2019 227   01/30/2014 196       Sodium (mmol/L)   Date Value   06/13/2019 143     Potassium (mmol/L)   Date Value   06/13/2019 4.0     Chloride (mmol/L)   Date Value   06/13/2019 107     Glucose (mg/dL)   Date Value   06/13/2019 91     CALCIUM (mg/dL)   Date Value   06/13/2019 9.3     Carbon Dioxide (mmol/L)   Date Value   06/13/2019 28     BUN (mg/dL)   Date Value   06/13/2019 13     Creatinine (mg/dL)   Date Value   06/13/2019 0.87     ALK PHOSPHATASE (Units/L)   Date Value   06/13/2019 139 (H)     AST/SGOT (Units/L)   Date Value   06/13/2019 31     ALT/SGPT (Units/L)   Date Value   06/13/2019 42       IMAGING  No results found.    ASSESSMENT   1. Renal cell carcinoma of right kidney. Currently on Nivolumab since January 2015 after progressing previously on Afinitor, Pazopanib and Axitinib. Last CT CAP on 3/4/2019 showed no evidence of recurrence. Continue current treatment course.  2. IVC Thrombosis. Resolved. Currently off of Xarelto.    PLAN  1. Continue Nivolumab 480mg q 8 weeks; Cycle 30, Day 1 today  2. Supportive care    I reviewed lab results with patient. Patient continues to tolerate treatment well, hence, will continue current treatment course. Labs adequate for treatment today. Rationale for proposed plan of care discussed. Patient stated understanding and agreement. Questions were answered. Follow-up was arranged. Immunotherapy orders performed and doses reviewed with treatment nurse and pharmacist.     FOLLOW UP  1. Nivolumab today  2. MD, labs (cbc,  cmp, ldh, mag, TSH), Nivolumab in 8 weeks    This chart was documented by Tommy Nevarez, acting as a scribe for Trevon Islas MD. 6/13/2019, 12:24 PM.    The documentation recorded by the scribe accurately and completely reflects the service(s) I personally performed and the decisions made by me.                        ff up w/ pcp

## 2022-09-14 NOTE — ED PROVIDER NOTE - PATIENT PORTAL LINK FT
You can access the FollowMyHealth Patient Portal offered by [facility] by registering at the following website: [email or web address]. By joining [employer]’s FollowMyHealth portal, you will also be able to view your health information using other applications (apps) compatible with our system.

## 2022-09-14 NOTE — ED PROVIDER NOTE - OBJECTIVE STATEMENT
57-year-old female with has medical history of anxiety AV malformation asthma seizure presenting for evaluation of head injury.  Patient was at her dietitian's office and had a mechanical fall with positive head injury.  No LOC.  No blood thinners.  Reports a mild headache. No cp, sob, fever, chills, abdominal pain, nausea, vomiting, diarrhea, back pain, urinary symptoms, dizziness, paresthesias, or weakness.

## 2022-09-14 NOTE — ED PROVIDER NOTE - CARE PLAN
Principal Discharge DX:	Closed head injury   1 Principal Discharge DX:	Closed head injury  Secondary Diagnosis:	Accident due to mechanical fall without injury

## 2022-09-14 NOTE — ED PROVIDER NOTE - NS ED ATTENDING STATEMENT MOD
This was a shared visit with the [facility]. I reviewed and verified the documentation and independently performed the documented:

## 2022-09-14 NOTE — ED PROVIDER NOTE - CLINICAL SUMMARY MEDICAL DECISION MAKING FREE TEXT BOX
57yF p/w mechanical fall and head trauma.  Pt well appearing, apparently at her baseline, w/ unsteady gait but no focal/new neuro deficits.  CTH w/o acute pathology.  Pt feeling comfortable w/ her gait despite unsteadiness to return home and  at bedside agrees and helps her stay safe at home.  Recommend supportive care, o/p PCP f/u, return precautions.

## 2022-09-15 DIAGNOSIS — R26.89 OTHER ABNORMALITIES OF GAIT AND MOBILITY: ICD-10-CM

## 2022-10-28 NOTE — ASSESSMENT
[FreeTextEntry1] : 57 year old female, known to have seizure secondary to AVM since 1986, asthma, prediabetes, EVERETTE off iron, urinary incontinence s/p pessary by Uro gyn, presented for follow up.\par \par \par \par On her way to this clinic patient had a fall witnessed by the . She did not lose consciousness however she did fall straight on her forehead and wasn't able to break her fall. She remembers feeling weakness in her right lower extremity and not being able to extend arm to break her fall>> Will refer to ED for trauma/TIA workup; \par Attempted to call ED for signout with no success; \par \par \par \par # Brain mass and AVM s/p resection - Mild left hemimotor syndrome\par # Seizure disorder\par - on anti seizure medication (Keppra, valproic acid, depakote, and oxcarbazepine)\par - on levocarnitine due to high ammonia levels likely secondary to side effect of valproate \par - follows with neuro\par \par # Bipolar disorder while on SSRI - Psych referral - patient reluctant \par \par # Asthma - Well controlled; c/w Albuterol/Ipratropium and Symbicort.\par \par # Prediabetes - Counseled on exercise & diet; \par \par # DLD - Advised lifestyle modification and dietary modifications. \par \par # h/o of stress incontinence - Follows w/ GYN\par \par \par # HCM\par - Mammogram last 2020 negative\par - Pap smear up to date / fu with GYN \par - Colonoscopy: Normal results in 2018 per the patient\par - Inhalers refills sent to pharmacy; \par \par Follow up after discharge from hospital;

## 2022-10-28 NOTE — HISTORY OF PRESENT ILLNESS
[de-identified] : 56 y/o F w/ pmhx known to have Seizure 2/2 AVM since 1986, asthma, prediabetes, EVERETTE off iron, Urinary incontinence s/p pessary by Uro gyn.\par Follows Neurology (Dr. Sampson; Last seen on 9/6): c/w current AED regimen -Keppra, Depakote, and oxcarbazepine; No breakthrough seizures.\par Follows Behavioral (last seen on 8/25): Patient has Anxiety/Depression; She is low risk, no attempts, adherent with medication and linked to treatment. Increase Vistaril to 25mg am + 50mg po bedtime.\par \par On her way to this clinic patient had a fall witnessed by the . She did not lose consciousness however she did fall straight on her forehead and wasn't able to break her fall. She remembers feeling weakness in her right lower extremity and not being able to extend arm to break her fall>> Will refer to ED for trauma/TIA workup; \par Attempted to call ED for signout with no success;

## 2022-10-31 LAB
ALBUMIN SERPL ELPH-MCNC: 4.4 G/DL
ALP BLD-CCNC: 81 U/L
ALT SERPL-CCNC: 16 U/L
ANION GAP SERPL CALC-SCNC: 11 MMOL/L
AST SERPL-CCNC: 17 U/L
BASOPHILS # BLD AUTO: 0.01 K/UL
BASOPHILS NFR BLD AUTO: 0.2 %
BILIRUB SERPL-MCNC: 0.2 MG/DL
BUN SERPL-MCNC: 23 MG/DL
CALCIUM SERPL-MCNC: 9.7 MG/DL
CHLORIDE SERPL-SCNC: 102 MMOL/L
CHOLEST SERPL-MCNC: 247 MG/DL
CO2 SERPL-SCNC: 28 MMOL/L
CREAT SERPL-MCNC: 0.7 MG/DL
EGFR: 101 ML/MIN/1.73M2
EOSINOPHIL # BLD AUTO: 0.07 K/UL
EOSINOPHIL NFR BLD AUTO: 1.1 %
ESTIMATED AVERAGE GLUCOSE: 128 MG/DL
GLUCOSE SERPL-MCNC: 100 MG/DL
HBA1C MFR BLD HPLC: 6.1 %
HCT VFR BLD CALC: 40.3 %
HDLC SERPL-MCNC: 61 MG/DL
HGB BLD-MCNC: 13.2 G/DL
IMM GRANULOCYTES NFR BLD AUTO: 0.3 %
LDLC SERPL CALC-MCNC: 149 MG/DL
LYMPHOCYTES # BLD AUTO: 3.91 K/UL
LYMPHOCYTES NFR BLD AUTO: 61.4 %
MAN DIFF?: NORMAL
MCHC RBC-ENTMCNC: 31 PG
MCHC RBC-ENTMCNC: 32.8 G/DL
MCV RBC AUTO: 94.6 FL
MONOCYTES # BLD AUTO: 0.64 K/UL
MONOCYTES NFR BLD AUTO: 10 %
NEUTROPHILS # BLD AUTO: 1.72 K/UL
NEUTROPHILS NFR BLD AUTO: 27 %
NONHDLC SERPL-MCNC: 186 MG/DL
PLATELET # BLD AUTO: 214 K/UL
POTASSIUM SERPL-SCNC: 4.1 MMOL/L
PROT SERPL-MCNC: 7.3 G/DL
RBC # BLD: 4.26 M/UL
RBC # FLD: 13.2 %
SODIUM SERPL-SCNC: 141 MMOL/L
TRIGL SERPL-MCNC: 184 MG/DL
WBC # FLD AUTO: 6.37 K/UL

## 2022-11-01 ENCOUNTER — TRANSCRIPTION ENCOUNTER (OUTPATIENT)
Age: 57
End: 2022-11-01

## 2022-11-01 ENCOUNTER — APPOINTMENT (OUTPATIENT)
Dept: PSYCHIATRY | Facility: CLINIC | Age: 57
End: 2022-11-01

## 2022-11-01 ENCOUNTER — NON-APPOINTMENT (OUTPATIENT)
Age: 57
End: 2022-11-01

## 2022-11-01 ENCOUNTER — OUTPATIENT (OUTPATIENT)
Dept: OUTPATIENT SERVICES | Facility: HOSPITAL | Age: 57
LOS: 1 days | Discharge: HOME | End: 2022-11-01

## 2022-11-01 DIAGNOSIS — F41.1 GENERALIZED ANXIETY DISORDER: ICD-10-CM

## 2022-11-01 DIAGNOSIS — Z90.710 ACQUIRED ABSENCE OF BOTH CERVIX AND UTERUS: Chronic | ICD-10-CM

## 2022-11-01 DIAGNOSIS — G47.00 INSOMNIA, UNSPECIFIED: ICD-10-CM

## 2022-11-01 PROCEDURE — 99214 OFFICE O/P EST MOD 30 MIN: CPT | Mod: 95

## 2022-11-01 NOTE — SOCIAL HISTORY
[With Significant Other] : lives with significant other [Unemployed] : unemployed [] :  [Yes] : yes [No Known Use] : no known use [FreeTextEntry4] : 10th grade [FreeTextEntry1] : Patient was born, raised in [place]. She was employed as a  for thirty yrs. She has 21yo daughter, 35yo son.  She was  for seven years. Her parents reside in [place], two sisters in [place] and one sister on .  [TextBox_7] : Rarely

## 2022-11-01 NOTE — HISTORY OF PRESENT ILLNESS
[de-identified] : She tolerated increase in Hydroxyzine with improved anxiety.  She sleeps well,  mood is better, denies episodes of tearfulness and good appetite. She tends to experience racing thoughts due to her daughter, denies panic symptoms.  She is able to self soothe. Episodes of anger can initiate seizures. She attends weekly PT due to left sided weakness.   She follows up appointment with neurologist three months. Presently, she denies thoughts of harm to self or others, provides her family as protective factors, safety plan is discussed, she would call 911. \par  [FreeTextEntry1] : She tolerated increase in Hydroxyzine with improved anxiety.  She sleeps well,  mood is better, denies episodes of tearfulness and good appetite. It is difficult for her to accept medical diagnosis of epilepsy.  She tends to experience racing thoughts due to issues her daughter deals with, denies panic symptoms.  She is able to self soothe and educated regarding meditation. She reports episodes of anger can initiate seizures. She attends weekly PT due to left sided weakness.   She follows up with neurologist every three months. Presently, she denies thoughts of harm to self or others, provides her family as protective factors, safety plan is discussed, she would call 911. \par

## 2022-11-01 NOTE — PHYSICAL EXAM
[Talkative] : talkative [Normal] : normal [Fair] : fair [FreeTextEntry9] : Full [Average] : average [Cooperative] : cooperative [Euthymic] : euthymic [Full] : full [Clear] : clear [Linear/Goal Directed] : linear/goal directed [WNL] : within normal limits [FreeTextEntry1] : Casually dressed

## 2022-11-01 NOTE — REASON FOR VISIT
[Home] : at home, [unfilled] , at the time of the visit. [Medical Office: ([facility])___] : at the medical office located in  [Patient] : the patient

## 2022-11-01 NOTE — DISCUSSION/SUMMARY
[FreeTextEntry1] : Yecenia is a 58yo  female, history of anxiety,  PMHx of seizure dx secondary to AVM resection 2005.  She tolerated increase in Hydroxyzine with improved anxiety.  She sleeps well,  mood is better, denies episodes of tearfulness. She is able to self soothe and educated regarding meditation. She is low risk, no attempts, adherent with medication and linked to treatment.\par \par Hydroxyzine 25mg am + 50mg po bedtime \par \par

## 2022-11-03 ENCOUNTER — TRANSCRIPTION ENCOUNTER (OUTPATIENT)
Age: 57
End: 2022-11-03

## 2022-12-12 ENCOUNTER — APPOINTMENT (OUTPATIENT)
Dept: INTERNAL MEDICINE | Facility: CLINIC | Age: 57
End: 2022-12-12

## 2022-12-12 ENCOUNTER — OUTPATIENT (OUTPATIENT)
Dept: OUTPATIENT SERVICES | Facility: HOSPITAL | Age: 57
LOS: 1 days | Discharge: HOME | End: 2022-12-12

## 2022-12-12 ENCOUNTER — APPOINTMENT (OUTPATIENT)
Dept: NUTRITION | Facility: CLINIC | Age: 57
End: 2022-12-12

## 2022-12-12 VITALS
HEART RATE: 61 BPM | DIASTOLIC BLOOD PRESSURE: 52 MMHG | OXYGEN SATURATION: 98 % | TEMPERATURE: 97.3 F | SYSTOLIC BLOOD PRESSURE: 95 MMHG | HEIGHT: 61 IN | BODY MASS INDEX: 28.32 KG/M2 | WEIGHT: 150 LBS

## 2022-12-12 DIAGNOSIS — Z90.710 ACQUIRED ABSENCE OF BOTH CERVIX AND UTERUS: Chronic | ICD-10-CM

## 2022-12-12 DIAGNOSIS — Z00.00 ENCOUNTER FOR GENERAL ADULT MEDICAL EXAMINATION W/OUT ABNORMAL FINDINGS: ICD-10-CM

## 2022-12-12 PROCEDURE — 99214 OFFICE O/P EST MOD 30 MIN: CPT | Mod: GC

## 2022-12-12 NOTE — PHYSICAL EXAM
[Normal] : soft, non-tender, non-distended, no masses palpated, no HSM and normal bowel sounds [No Joint Swelling] : no joint swelling

## 2022-12-12 NOTE — HISTORY OF PRESENT ILLNESS
[de-identified] : 58 y/o F w/ pmhx known to have Seizure 2/2 AVM since 1986, asthma, prediabetes, EVERETTE off iron, Urinary incontinence s/p pessary by Uro gyn. Here for routine follow up. \par Follows Neurology (Dr. Olmedo; Last seen on 9/6): c/w current AED regimen -Keppra, Depakote, and oxcarbazepine; No breakthrough seizures.\par Follows Behavioral (last seen on 11/01): Patient has Anxiety/Depression; She is low risk, no attempts, adherent with medication and linked to treatment.

## 2022-12-12 NOTE — ASSESSMENT
[FreeTextEntry1] : 57 year old female, known to have seizure secondary to AVM since 1986, asthma, prediabetes, EVERETTE off iron, urinary incontinence s/p pessary by Uro gyn, presented for follow up.\par \par # Brain mass and AVM s/p resection - Mild left hemimotor syndrome\par # Seizure disorder\par - on anti seizure medication (Keppra, valproic acid, depakote, and oxcarbazepine)\par - on levocarnitine due to high ammonia levels likely secondary to side effect of valproate \par - follows with neuro\par \par # Bipolar disorder while on SSRI - follows with Psych \par \par # Asthma \par - uses rescue inhaler 3+ times/week\par - educated on proper inhaler use, was using symbicort 1x/dsy instead of BID\par - reassess during next visit \par - c/w Albuterol and Symbicort. 160/4.5\par \par # Prediabetes - Counseled on exercise & diet; \par \par # DLD - Advised lifestyle modification and dietary modifications. \par - LDL >130, unable to lower cholesterol on diet alone\par - start on atorvastatin 10mg \par - ASCVD 10 year risk 2%, lifetime 39%\par \par # h/o of stress incontinence - Follows w/ GYN\par \par \par # HCM\par - Mammogram last 2020 negative\par - Pap smear up to date / fu with GYN \par - Colonoscopy: Normal results in 2018 per the patient\par - follow up in 3 months with repeat lab work

## 2023-01-04 DIAGNOSIS — F31.10 BIPOLAR DISORDER, CURRENT EPISODE MANIC WITHOUT PSYCHOTIC FEATURES, UNSPECIFIED: ICD-10-CM

## 2023-01-04 DIAGNOSIS — Z00.00 ENCOUNTER FOR GENERAL ADULT MEDICAL EXAMINATION WITHOUT ABNORMAL FINDINGS: ICD-10-CM

## 2023-01-04 DIAGNOSIS — E78.5 HYPERLIPIDEMIA, UNSPECIFIED: ICD-10-CM

## 2023-01-04 DIAGNOSIS — D64.9 ANEMIA, UNSPECIFIED: ICD-10-CM

## 2023-01-31 ENCOUNTER — APPOINTMENT (OUTPATIENT)
Dept: PSYCHIATRY | Facility: CLINIC | Age: 58
End: 2023-01-31
Payer: MEDICAID

## 2023-01-31 ENCOUNTER — OUTPATIENT (OUTPATIENT)
Dept: OUTPATIENT SERVICES | Facility: HOSPITAL | Age: 58
LOS: 1 days | Discharge: HOME | End: 2023-01-31

## 2023-01-31 DIAGNOSIS — F41.9 ANXIETY DISORDER, UNSPECIFIED: ICD-10-CM

## 2023-01-31 DIAGNOSIS — Z90.710 ACQUIRED ABSENCE OF BOTH CERVIX AND UTERUS: Chronic | ICD-10-CM

## 2023-01-31 PROCEDURE — 99213 OFFICE O/P EST LOW 20 MIN: CPT | Mod: 95

## 2023-01-31 NOTE — DISCUSSION/SUMMARY
[FreeTextEntry1] : Yecenia is a 58yo  female, history of anxiety,  PMHx of seizure dx secondary to AVM resection 2005.  Her mood and anxiety symptoms have improved with Hydroxyzine. She is low risk, no acute thoughts of harm to self, adherent with medication and linked to treatment.\par \par Hydroxyzine 25mg am + 50mg po bedtime \par \par

## 2023-01-31 NOTE — PHYSICAL EXAM
[Average] : average [Cooperative] : cooperative [Euthymic] : euthymic [Full] : full [Clear] : clear [Linear/Goal Directed] : linear/goal directed [WNL] : within normal limits [FreeTextEntry1] : Casually dressed

## 2023-01-31 NOTE — REASON FOR VISIT
[Home] : at home, [unfilled] , at the time of the visit. [Medical Office: ([facility])___] : at the medical office located in  [Patient] : the patient

## 2023-01-31 NOTE — SOCIAL HISTORY
[With Significant Other] : lives with significant other [Unemployed] : unemployed [] :  [Yes] : yes [No Known Use] : no known use [Committed Relationship] : in a committed relationship [FreeTextEntry4] : 10th grade [TextBox_7] : Rarely [FreeTextEntry1] : Patient was born, raised in [place]. She was employed as a  for thirty yrs. She has 19yo daughter, 35yo son.  She was  for seven years. Her parents reside in [place], two sisters in [place] and one sister on .

## 2023-01-31 NOTE — HISTORY OF PRESENT ILLNESS
[FreeTextEntry1] : She sleeps well, mood is better, she denies episodes of tearfulness. She has good appetite.  She experiences racing thoughts regarding her daughter, denies panic symptoms.  She is able to self soothe. She denies recent episodes of seizures. She attends weekly PT due to left sided weakness. She is adherent with Hydroxyzine, denies side effects. Presently, she denies thoughts of harm to self.\par

## 2023-02-01 ENCOUNTER — NON-APPOINTMENT (OUTPATIENT)
Age: 58
End: 2023-02-01

## 2023-02-01 ENCOUNTER — EMERGENCY (EMERGENCY)
Facility: HOSPITAL | Age: 58
LOS: 0 days | Discharge: HOME | End: 2023-02-02
Attending: EMERGENCY MEDICINE | Admitting: EMERGENCY MEDICINE
Payer: MEDICAID

## 2023-02-01 VITALS
DIASTOLIC BLOOD PRESSURE: 67 MMHG | RESPIRATION RATE: 18 BRPM | WEIGHT: 147.93 LBS | OXYGEN SATURATION: 97 % | SYSTOLIC BLOOD PRESSURE: 154 MMHG | HEIGHT: 61 IN | TEMPERATURE: 99 F | HEART RATE: 115 BPM

## 2023-02-01 DIAGNOSIS — F41.9 ANXIETY DISORDER, UNSPECIFIED: ICD-10-CM

## 2023-02-01 DIAGNOSIS — R79.89 OTHER SPECIFIED ABNORMAL FINDINGS OF BLOOD CHEMISTRY: ICD-10-CM

## 2023-02-01 DIAGNOSIS — Z86.39 PERSONAL HISTORY OF OTHER ENDOCRINE, NUTRITIONAL AND METABOLIC DISEASE: ICD-10-CM

## 2023-02-01 DIAGNOSIS — Z90.710 ACQUIRED ABSENCE OF BOTH CERVIX AND UTERUS: ICD-10-CM

## 2023-02-01 DIAGNOSIS — Z86.2 PERSONAL HISTORY OF DISEASES OF THE BLOOD AND BLOOD-FORMING ORGANS AND CERTAIN DISORDERS INVOLVING THE IMMUNE MECHANISM: ICD-10-CM

## 2023-02-01 DIAGNOSIS — G40.909 EPILEPSY, UNSPECIFIED, NOT INTRACTABLE, WITHOUT STATUS EPILEPTICUS: ICD-10-CM

## 2023-02-01 DIAGNOSIS — Z76.89 PERSONS ENCOUNTERING HEALTH SERVICES IN OTHER SPECIFIED CIRCUMSTANCES: ICD-10-CM

## 2023-02-01 DIAGNOSIS — Z90.710 ACQUIRED ABSENCE OF BOTH CERVIX AND UTERUS: Chronic | ICD-10-CM

## 2023-02-01 DIAGNOSIS — J45.909 UNSPECIFIED ASTHMA, UNCOMPLICATED: ICD-10-CM

## 2023-02-01 PROCEDURE — 99285 EMERGENCY DEPT VISIT HI MDM: CPT

## 2023-02-01 NOTE — ED ADULT TRIAGE NOTE - CHIEF COMPLAINT QUOTE
I went to do blood work today and the doctor told me to come in - patient   Patient has a seizure history, Valproic levels elevated - 126. Patient denies symptoms

## 2023-02-01 NOTE — ED PROVIDER NOTE - CONDITION AT DISCHARGE:
Scheduled procedure with Patient at      Telephone Information:   Mobile [phone]      Scheduled Via: Case Request Order for [facility]   If non-ambulatory location, select reason: Not applicable  Did patient request a specific facility other than MD's preferred facility? No  Procedure date: 06/19/2018   Procedure time: 830AM ; Did patient request this specific time? No  Rep Contacted?: n/a  Notified patient about receiving an animated Gisel program? Yes    The following have been confirmed:  Insurance name confirmed as [employer], will be the same at time of procedure?: Yes Ins Accepted at Facility? Yes  Latex Allergy No  Diabetic No  Sleep Apnea No  Diuretic/Water pill No  Defibrillator/Pacemaker No  MRSA hx No  Blood thinners: Coumadin (Warfarin) or Plavix No      Aspirin Yes      Phentermine (diet pill) No    Pre-Op testing required No, Patient informed Yes  Prep required? YES; Briefly reviewed? Yes; Prep cost range discussed? Yes  If procedure is scheduled 7 days or less, patient was told to  prep letter?: n/a       Satisfactory

## 2023-02-01 NOTE — ED PROVIDER NOTE - CLINICAL SUMMARY MEDICAL DECISION MAKING FREE TEXT BOX
Patient presented for evaluation of elevated blood acid levels.  Neurology consulted.  No acute intervention however they recommended repeat labs in 2 weeks.  Patient will be discharged to follow-up with her neurologist Dr. Olmedo.

## 2023-02-01 NOTE — ED PROVIDER NOTE - PROGRESS NOTE DETAILS
Spoke with neurology, no need for admission.  Advised that patient needs to have the valproic acid levels checked right before taking her daily dose.  Will advise patient to follow-up with PMD and her neurologist.

## 2023-02-01 NOTE — ED PROVIDER NOTE - OBJECTIVE STATEMENT
Patient is a 57-year-old woman with a history of anxiety, asthma, AV malformation, seizures on valproic acid, presenting for abnormal valproic acid levels.  Patient was called by PMD to go to the ED for evaluation of valproic acid level of 126 today.  Patient is fully alert and awake, oriented x3, ambulating steadily, with normal vital signs.  She denies any symptoms.  She and the son at bedside are not sure what was requested by PMD to be done in the emergency department.  Last seizure sometime within the past 12 months.

## 2023-02-01 NOTE — ED PROVIDER NOTE - ATTENDING CONTRIBUTION TO CARE
I personally evaluated the patient. I reviewed the Resident’s or Physician Assistant’s note (as assigned above), and agree with the findings and plan except as documented in my note.    57-year-old female with past medical history seizure disorder on valproic acid was referred to the ED due to abnormal lab levels.  Patient had routine lab work including valproic acid level was done today and the level came back elevated at 126.  Patient denies any symptoms.  Stated that the  who spoke with her told her to come immediately to the ED for evaluation.  VSS, non toxic appearing, NAD, Head NCAT, MMM, neck supple, normal ROM, normal s1s2, lungs ctab, abd s/nt/nd, no guarding or rebound, extremities wnl, AAO x 3, GCS 15, neuro grossly normal. No acute skin lesions. Plan is neurology consult in this well currently.

## 2023-02-01 NOTE — ED PROVIDER NOTE - NSFOLLOWUPINSTRUCTIONS_ED_ALL_ED_FT
Please follow-up with your PRIMARY CARE PHYSICIAN and NEUROLOGIST.     Please have your valproic acid levels checked right before taking your daily dose.    ---------------------------------------------------------------------------------------------------    SEEK IMMEDIATE MEDICAL CARE IF YOU HAVE ANY OF THE FOLLOWING SYMPTOMS: seizure lasting over 5 minutes, not waking up or persistent altered mental status after the seizure, or more frequent or worsening seizures, fever, vomiting, stiff neck, loss of vision, problems with speech, muscle weakness, loss of balance, trouble walking, passing out, or confusion.

## 2023-02-01 NOTE — ED PROVIDER NOTE - PHYSICAL EXAMINATION
_  CONSTITUTIONAL: NAD  SKIN: Warm, dry  HEAD: NCAT  EYES: Clear conjunctiva   ENT: MMM  NECK: Supple  CARD: RRR, S1, S2; no M/R/G  RESP: Speaking in full sentences; CTAB  ABD: S/NT, no R/G  EXT: Pulses palpable distally  NEURO: No tremors, rigidity, hyperreflexia  PSYCH: Cooperative, appropriate

## 2023-02-02 VITALS
DIASTOLIC BLOOD PRESSURE: 64 MMHG | OXYGEN SATURATION: 96 % | TEMPERATURE: 98 F | RESPIRATION RATE: 19 BRPM | SYSTOLIC BLOOD PRESSURE: 115 MMHG | HEART RATE: 96 BPM

## 2023-02-02 LAB
ALBUMIN SERPL ELPH-MCNC: 4.2 G/DL — SIGNIFICANT CHANGE UP (ref 3.5–5.2)
ALP SERPL-CCNC: 79 U/L — SIGNIFICANT CHANGE UP (ref 30–115)
ALT FLD-CCNC: 11 U/L — SIGNIFICANT CHANGE UP (ref 0–41)
ANION GAP SERPL CALC-SCNC: 10 MMOL/L — SIGNIFICANT CHANGE UP (ref 7–14)
AST SERPL-CCNC: 16 U/L — SIGNIFICANT CHANGE UP (ref 0–41)
BASOPHILS # BLD AUTO: 0.02 K/UL — SIGNIFICANT CHANGE UP (ref 0–0.2)
BASOPHILS NFR BLD AUTO: 0.3 % — SIGNIFICANT CHANGE UP (ref 0–1)
BILIRUB DIRECT SERPL-MCNC: <0.2 MG/DL — SIGNIFICANT CHANGE UP (ref 0–0.3)
BILIRUB INDIRECT FLD-MCNC: SIGNIFICANT CHANGE UP MG/DL (ref 0.2–1.2)
BILIRUB SERPL-MCNC: <0.2 MG/DL — SIGNIFICANT CHANGE UP (ref 0.2–1.2)
BUN SERPL-MCNC: 21 MG/DL — HIGH (ref 10–20)
CALCIUM SERPL-MCNC: 10.2 MG/DL — SIGNIFICANT CHANGE UP (ref 8.4–10.5)
CHLORIDE SERPL-SCNC: 104 MMOL/L — SIGNIFICANT CHANGE UP (ref 98–110)
CO2 SERPL-SCNC: 27 MMOL/L — SIGNIFICANT CHANGE UP (ref 17–32)
CREAT SERPL-MCNC: 0.5 MG/DL — LOW (ref 0.7–1.5)
EGFR: 110 ML/MIN/1.73M2 — SIGNIFICANT CHANGE UP
EOSINOPHIL # BLD AUTO: 0.05 K/UL — SIGNIFICANT CHANGE UP (ref 0–0.7)
EOSINOPHIL NFR BLD AUTO: 0.8 % — SIGNIFICANT CHANGE UP (ref 0–8)
GLUCOSE SERPL-MCNC: 117 MG/DL — HIGH (ref 70–99)
HCT VFR BLD CALC: 34.7 % — LOW (ref 37–47)
HGB BLD-MCNC: 11.8 G/DL — LOW (ref 12–16)
IMM GRANULOCYTES NFR BLD AUTO: 0.2 % — SIGNIFICANT CHANGE UP (ref 0.1–0.3)
LYMPHOCYTES # BLD AUTO: 3.15 K/UL — SIGNIFICANT CHANGE UP (ref 1.2–3.4)
LYMPHOCYTES # BLD AUTO: 51.2 % — HIGH (ref 20.5–51.1)
MCHC RBC-ENTMCNC: 32.1 PG — HIGH (ref 27–31)
MCHC RBC-ENTMCNC: 34 G/DL — SIGNIFICANT CHANGE UP (ref 32–37)
MCV RBC AUTO: 94.3 FL — SIGNIFICANT CHANGE UP (ref 81–99)
MONOCYTES # BLD AUTO: 0.85 K/UL — HIGH (ref 0.1–0.6)
MONOCYTES NFR BLD AUTO: 13.8 % — HIGH (ref 1.7–9.3)
NEUTROPHILS # BLD AUTO: 2.07 K/UL — SIGNIFICANT CHANGE UP (ref 1.4–6.5)
NEUTROPHILS NFR BLD AUTO: 33.7 % — LOW (ref 42.2–75.2)
NRBC # BLD: 0 /100 WBCS — SIGNIFICANT CHANGE UP (ref 0–0)
PLATELET # BLD AUTO: 154 K/UL — SIGNIFICANT CHANGE UP (ref 130–400)
POTASSIUM SERPL-MCNC: 5.2 MMOL/L — HIGH (ref 3.5–5)
POTASSIUM SERPL-SCNC: 5.2 MMOL/L — HIGH (ref 3.5–5)
PROT SERPL-MCNC: 6.9 G/DL — SIGNIFICANT CHANGE UP (ref 6–8)
RBC # BLD: 3.68 M/UL — LOW (ref 4.2–5.4)
RBC # FLD: 14.1 % — SIGNIFICANT CHANGE UP (ref 11.5–14.5)
SODIUM SERPL-SCNC: 141 MMOL/L — SIGNIFICANT CHANGE UP (ref 135–146)
WBC # BLD: 6.15 K/UL — SIGNIFICANT CHANGE UP (ref 4.8–10.8)
WBC # FLD AUTO: 6.15 K/UL — SIGNIFICANT CHANGE UP (ref 4.8–10.8)

## 2023-02-02 NOTE — CONSULT NOTE ADULT - ASSESSMENT
This is a 57 year old female, known to have seizure secondary to AVM since 1986, asthma, prediabetes, EVERETTE off iron, urinary incontinence s/p pessary by Uro gyn, presented for elevated VPA level 126. Patient is compliant with Divalproex sodium 500 mg BID and is currently asymptomatic, denies any breakthrough seizures. Neurology was consulted for elevated VPA level as was instructed by internal medicine PCP to come to ED.     Plan:  - No need for further inpatient work up  - Likely was not a trough level of VPA  - Can repeat outpatient VPA trough, f/u       This is a 57 year old female, known to have seizure secondary to AVM since 1986, asthma, prediabetes, EVERETTE off iron, urinary incontinence s/p pessary by Uro gyn, presented for elevated VPA level 126. Patient is compliant with Divalproex sodium 500 mg BID and is currently asymptomatic, denies any breakthrough seizures. Neurology was consulted for elevated VPA level as was instructed by internal medicine PCP to come to ED.     Plan:  - No need for further inpatient work up  - Likely was not a trough level of VPA  - Can repeat outpatient VPA trough, f/u  - Continue current AEDs

## 2023-02-02 NOTE — CONSULT NOTE ADULT - SUBJECTIVE AND OBJECTIVE BOX
Neurology Consult Note    HPI:  This is a 58 yo F w/ PMHx of seizures 2/2 AVM since 1986, asthma, prediabetes, EVERETTE off iron, urinary incontinence s/p pessary by Uro gyn. Patient was seen for routine follow up outpatient in December 2022 and had labs done outpatient. She was sent in by her Internist because her VPA level was elevated 126, patient is on Divaproex sodium 500 mg BID and compliant with medications. She follows with Neurology (Dr. Olmedo; Last seen on 9/6) and current AED regimen includes Keppra, Depakote, and oxcarbazepine. Denies any breakthrough seizures. She also follows with Behavior specialist (last seen on 11/01) for anxiety/depression.      PAST MEDICAL & SURGICAL HISTORY:  Arteriovenous malformation (AVM)  Seizures  last seizure 3 days ago, x2 episodes  Asthma  last attack 11/30/2020  History of radiation therapy  1992 - brain, for seizure  Anxiety  H/O: hysterectomy  for heavy menses      Vital Signs Last 24 Hrs  T(C): 37.1 (01 Feb 2023 20:27), Max: 37.1 (01 Feb 2023 20:27)  T(F): 98.7 (01 Feb 2023 20:27), Max: 98.7 (01 Feb 2023 20:27)  HR: 115 (01 Feb 2023 20:27) (115 - 115)  BP: 154/67 (01 Feb 2023 20:27) (154/67 - 154/67)  RR: 18 (01 Feb 2023 20:27) (18 - 18)  SpO2: 97% (01 Feb 2023 20:27) (97% - 97%)    Parameters below as of 01 Feb 2023 20:27  Patient On (Oxygen Delivery Method): room air      Neurological Exam:   Mental status: Awake, alert and oriented x3. No dysarthria, no aphasia. Follows commands.  Cranial nerves: Pupils equally round and reactive to light, visual fields full, no nystagmus, extraocular muscles intact, V1 through V3 intact bilaterally and symmetric, face symmetric, hearing intact to finger rub, palate elevation symmetric, tongue was midline.  Motor: MRC grading 5/5 B/L UE/LE.  strength 5/5. Normal tone and bulk. No abnormal movements.    Sensation: Intact to light touch, proprioception, and pinprick.   Coordination: No dysmetria on finger-to-nose testing.  Reflexes: 2+ in bilateral UE/LE, downgoing toes bilaterally.  Gait: Narrow and steady. No ataxia. Romberg negative.

## 2023-02-02 NOTE — ED ADULT NURSE NOTE - OBJECTIVE STATEMENT
I went to do blood work today and her pmd called her to come for follow- up blood work because her valproic acid level was high. Pt is on antiseizure medications. Denies any seizure activities and pains.

## 2023-02-05 LAB — VALPROATE FREE SERPL-MCNC: 21.9 MG/L — HIGH (ref 4.8–17.3)

## 2023-02-20 NOTE — BEGINNING OF VISIT
[Patient] : patient Keystone Flap Text: The defect edges were debeveled with a #15 scalpel blade.  Given the location of the defect, shape of the defect a keystone flap was deemed most appropriate.  Using a sterile surgical marker, an appropriate keystone flap was drawn incorporating the defect, outlining the appropriate donor tissue and placing the expected incisions within the relaxed skin tension lines where possible. The area thus outlined was incised deep to adipose tissue with a #15 scalpel blade.  The skin margins were undermined to an appropriate distance in all directions around the primary defect and laterally outward around the flap utilizing iris scissors.

## 2023-02-28 ENCOUNTER — OUTPATIENT (OUTPATIENT)
Dept: OUTPATIENT SERVICES | Facility: HOSPITAL | Age: 58
LOS: 1 days | End: 2023-02-28
Payer: MEDICAID

## 2023-02-28 ENCOUNTER — APPOINTMENT (OUTPATIENT)
Dept: NEUROLOGY | Facility: CLINIC | Age: 58
End: 2023-02-28
Payer: MEDICAID

## 2023-02-28 ENCOUNTER — APPOINTMENT (OUTPATIENT)
Dept: NEUROLOGY | Facility: CLINIC | Age: 58
End: 2023-02-28

## 2023-02-28 VITALS
OXYGEN SATURATION: 98 % | SYSTOLIC BLOOD PRESSURE: 142 MMHG | HEART RATE: 96 BPM | HEIGHT: 61 IN | TEMPERATURE: 98 F | BODY MASS INDEX: 28.22 KG/M2 | WEIGHT: 149.5 LBS | DIASTOLIC BLOOD PRESSURE: 83 MMHG

## 2023-02-28 DIAGNOSIS — Z00.00 ENCOUNTER FOR GENERAL ADULT MEDICAL EXAMINATION WITHOUT ABNORMAL FINDINGS: ICD-10-CM

## 2023-02-28 DIAGNOSIS — Z90.710 ACQUIRED ABSENCE OF BOTH CERVIX AND UTERUS: Chronic | ICD-10-CM

## 2023-02-28 PROCEDURE — 99214 OFFICE O/P EST MOD 30 MIN: CPT

## 2023-02-28 NOTE — ASSESSMENT
[FreeTextEntry1] : 57 yowoman  pmh seizures 2/2 AVM resection 2005, anemia, MELITA here for follow up. Reports having 2 breakthrough seizures since previous office visit with residual L sided UE/LE weakness. In the last visit her levels were checked and were elevated. She reported that she first takes her morning dose and then goes to lab. In addition she takes /500, Divalproex 500 mg BID and 250 valproic acid in the morning. Regardless given the two episodes of breakthrough seizure, recommended to increase Trileptal dose to 600 mg BID.  We asked her to take Depakote 500 mg BID and not to take the morning dose of valproic acid. \par \par Plan:\par #R parietal AVM s/p craniotomy 2005 \par #L sided focal breakthrough seizure\par - Patient had elevated keppra, depakote, ammonia levels 2/1/23\par - educated about medication compliance and about medication timing before checking levels\par - c/w keppra 750mg qD BID, Depakote 500mg BID, levocarnitine 330mg BID\par - increased oxcarbazepine 600mg BID\par - recheck AED levels, ammonia, bmp, hfts, through level before the morning dose. \par - f/u in clininc in 2 months

## 2023-02-28 NOTE — END OF VISIT
[] : Resident [FreeTextEntry3] : I visited the patient with resident. Agree with history, physical exam and plan as above.\par Two breakthrough seizure since last visit. Labs in the last visit showed  ammonia: 82. VPA: 126 and Keppra level: 78. Oxcarb: 18 but likely pick level. Recommended to recheck through level. Stop morning  mg and continue Depakote 500 mg BID, Keppra 750 mg BID and increase Oxcarb to 600 mg BID

## 2023-02-28 NOTE — PHYSICAL EXAM
[General Appearance - Alert] : alert [General Appearance - In No Acute Distress] : in no acute distress [General Appearance - Well Nourished] : well nourished [General Appearance - Well Developed] : well developed [General Appearance - Well-Appearing] : healthy appearing [Oriented To Time, Place, And Person] : oriented to person, place, and time [Affect] : the affect was normal [Mood] : the mood was normal [Person] : oriented to person [Place] : oriented to place [Time] : oriented to time [Cranial Nerves Optic (II)] : visual acuity intact bilaterally,  visual fields full to confrontation, pupils equal round and reactive to light [Cranial Nerves Oculomotor (III)] : extraocular motion intact [Cranial Nerves Trigeminal (V)] : facial sensation intact symmetrically [Cranial Nerves Facial (VII)] : face symmetrical [Cranial Nerves Vestibulocochlear (VIII)] : hearing was intact bilaterally [Cranial Nerves Glossopharyngeal (IX)] : tongue and palate midline [Cranial Nerves Accessory (XI - Cranial And Spinal)] : head turning and shoulder shrug symmetric [Cranial Nerves Hypoglossal (XII)] : there was no tongue deviation with protrusion [Motor Strength] : muscle strength was normal in all four extremities [Paresis Pronator Drift Left-Sided] : a left-sided pronator drift was present [Motor Strength Upper Extremities Left] : there was weakness of the left upper extremity [Motor Strength Lower Extremities Left] : there was weakness of the left lower extremity [Sensation Tactile Decrease] : light touch was intact [Coordination - Dysmetria Impaired Finger-to-Nose Left Only] : present on the left side [Sclera] : the sclera and conjunctiva were normal [PERRL With Normal Accommodation] : pupils were equal in size, round, reactive to light, with normal accommodation [Extraocular Movements] : extraocular movements were intact [Paresis Pronator Drift Right-Sided] : no pronator drift on the right [Motor Strength Upper Extremities Right] : strength was normal in the right upper extremity [Motor Strength Lower Extremities Right] : strength was normal in the right lower extremity [Coordination - Dysmetria Impaired Finger-to-Nose Right Only] : not present on the ride side [FreeTextEntry8] : Hemiparetic

## 2023-02-28 NOTE — REVIEW OF SYSTEMS
[Arm Weakness] : arm weakness [Leg Weakness] : leg weakness [Seizures] : convulsions [Difficulty Walking] : difficulty walking [Negative] : Integumentary [Confused or Disoriented] : no confusion [Memory Lapses or Loss] : no memory loss [Decr. Concentrating Ability] : no decrease in concentrating ability [Difficulty with Language] : no ~M difficulty with language [Changed Thought Patterns] : no change in thought patterns [Repeating Questions] : no repeated questioning about recent events [Facial Weakness] : no facial weakness [Hand Weakness] : no hand weakness [Poor Coordination] : good coordination [Difficulty Writing] : no difficulty writing [Difficulties in Speech] : no speech difficulties [Numbness] : no numbness [Tingling] : no tingling [Abnormal Sensation] : no abnormal sensation [Hypersensitivity] : no hypersensitivity [Dizziness] : no dizziness [Fainting] : no fainting [Lightheadedness] : no lightheadedness [Vertigo] : no vertigo [Cluster Headache] : no cluster headache [Migraine Headache] : no migraine headache [Tension Headache] : no tension-type headache [Inability to Walk] : able to walk [Ataxia] : no ataxia [Frequent Falls] : not falling [Limping] : not limping

## 2023-02-28 NOTE — HISTORY OF PRESENT ILLNESS
[FreeTextEntry1] : 57y F pmh seizure 2/2 AVm s/p resection in 2005 on vpa, keppra, trileptal in the the office for follow up. Patient reports that since last visit in the clinic she reported having 2 seizure (since spet 6th 2022). She reports having her last seizure 5 days ago, pt describes she was in the shower when she started having rhymic movements of the left upper and lower extremity and also repetitive head movements. She tries to be compliant with her medication but says she thinks she misses doses from time to time as she often finds pills scattered around her house. \par \par Patient recently had elevated ammonia, valproic acid and levetiracetam on February 1st, was asked to come to the ED to repeat labs. Repeat levels in the ED were again elevated. LFTs wnl. \par Upon medication review, patient was taking her medications as prescribed but was taking her morning doses before get her levels drawn. Patient was educated about importance of having her levels drawn before taking her morning medication. Also she takes Divalproex 500 mg BID and takes valproic acid 250 mg in the morning.  \par \par Patient continues to endorses chronic L UE and LE weakness since her AVM resection 2005, continues to have PT  2x/week. Patient continues to take hydroxyzine as per  reports improved symptoms since switching from Zoloft in August 2022.

## 2023-03-01 DIAGNOSIS — G40.909 EPILEPSY, UNSPECIFIED, NOT INTRACTABLE, WITHOUT STATUS EPILEPTICUS: ICD-10-CM

## 2023-03-09 LAB
CHOLEST SERPL-MCNC: 190 MG/DL
HDLC SERPL-MCNC: 70 MG/DL
LDLC SERPL CALC-MCNC: 92 MG/DL
NONHDLC SERPL-MCNC: 120 MG/DL
TRIGL SERPL-MCNC: 139 MG/DL

## 2023-03-10 LAB
ALBUMIN SERPL ELPH-MCNC: 4.3 G/DL
ALP BLD-CCNC: 90 U/L
ALT SERPL-CCNC: 14 U/L
AMMONIA PLAS-MCNC: 73 UMOL/L
ANION GAP SERPL CALC-SCNC: 13 MMOL/L
AST SERPL-CCNC: 12 U/L
BILIRUB DIRECT SERPL-MCNC: <0.2 MG/DL
BILIRUB INDIRECT SERPL-MCNC: NORMAL MG/DL
BILIRUB SERPL-MCNC: <0.2 MG/DL
BUN SERPL-MCNC: 21 MG/DL
CALCIUM SERPL-MCNC: 9.7 MG/DL
CHLORIDE SERPL-SCNC: 104 MMOL/L
CO2 SERPL-SCNC: 24 MMOL/L
CREAT SERPL-MCNC: 0.7 MG/DL
EGFR: 101 ML/MIN/1.73M2
GLUCOSE SERPL-MCNC: 104 MG/DL
POTASSIUM SERPL-SCNC: 4.1 MMOL/L
PROT SERPL-MCNC: 7.2 G/DL
SODIUM SERPL-SCNC: 141 MMOL/L
VALPROATE SERPL-MCNC: 91 UG/ML

## 2023-03-13 ENCOUNTER — APPOINTMENT (OUTPATIENT)
Dept: INTERNAL MEDICINE | Facility: CLINIC | Age: 58
End: 2023-03-13

## 2023-03-13 ENCOUNTER — APPOINTMENT (OUTPATIENT)
Dept: INTERNAL MEDICINE | Facility: CLINIC | Age: 58
End: 2023-03-13
Payer: MEDICAID

## 2023-03-13 ENCOUNTER — OUTPATIENT (OUTPATIENT)
Dept: OUTPATIENT SERVICES | Facility: HOSPITAL | Age: 58
LOS: 1 days | End: 2023-03-13
Payer: MEDICAID

## 2023-03-13 ENCOUNTER — NON-APPOINTMENT (OUTPATIENT)
Age: 58
End: 2023-03-13

## 2023-03-13 VITALS
DIASTOLIC BLOOD PRESSURE: 97 MMHG | SYSTOLIC BLOOD PRESSURE: 135 MMHG | WEIGHT: 149 LBS | HEIGHT: 61 IN | TEMPERATURE: 98.7 F | BODY MASS INDEX: 28.13 KG/M2 | OXYGEN SATURATION: 96 % | HEART RATE: 101 BPM

## 2023-03-13 DIAGNOSIS — Z90.710 ACQUIRED ABSENCE OF BOTH CERVIX AND UTERUS: Chronic | ICD-10-CM

## 2023-03-13 DIAGNOSIS — Z00.00 ENCOUNTER FOR GENERAL ADULT MEDICAL EXAMINATION WITHOUT ABNORMAL FINDINGS: ICD-10-CM

## 2023-03-13 PROCEDURE — 99214 OFFICE O/P EST MOD 30 MIN: CPT

## 2023-03-13 PROCEDURE — 99214 OFFICE O/P EST MOD 30 MIN: CPT | Mod: GC

## 2023-03-13 NOTE — HISTORY OF PRESENT ILLNESS
[Friend] : friend [FreeTextEntry1] : follow up [de-identified] : 58 y/o F w/ pmhx known to have Seizure 2/2 AVM since 1986, asthma, prediabetes, EVERETTE off iron, Urinary incontinence s/p pessary by Uro gyn. Here for routine follow up.  No complaints during this visit.\par Follows Neurology (Last seen on 2/28/23): c/w current AED regimen -Keppra, Depakote, and oxcarbazepine; No breakthrough seizures.\par To note the patient oxcarbazepine dose was increased to 600mg bid however she was feeling dizzy and lethargic. Her partner decided to decrease the dose to 300mg tid (which was her prio dose). \par Follows Behavioral (last seen on 11/01): Patient has Anxiety/Depression; She is low risk, no attempts, adherent with medication and linked to treatment.

## 2023-03-13 NOTE — ASSESSMENT
[FreeTextEntry1] : 57 year old female, known to have seizure secondary to AVM since 1986, asthma, prediabetes, EVERETTE off iron, urinary incontinence s/p pessary by Uro gyn, presented for follow up.\par \par # Brain mass and AVM s/p resection - Mild left hemimotor syndrome\par # Seizure disorder\par - on anti seizure medication (Keppra, depakote, and oxcarbazepine)\par - Valproate stopped by neurlogy due to elevated ammonia levels\par - on levocarnitine due to high ammonia levels likely secondary to side effect of valproate \par - follows with neuro\par - patient and partner were advised not to change AED without consulting the neurologist first\par \par # Bipolar disorder while on SSRI \par - follows with Psych \par \par # Asthma \par - uses rescue inhaler 3+ times/week\par - educated on proper inhaler use, was using symbicort 1x/dsy instead of BID\par - reassess during next visit \par - c/w Albuterol and Symbicort. 160/4.5\par \par # Prediabetes - Counseled on exercise & diet; \par \par # DLD - Advised lifestyle modification and dietary modifications. \par - LDL 92 on this visit\par - c/w  atorvastatin 10mg \par \par # h/o of stress incontinence - Follows w/ GYN\par \par \par # HCM\par - Mammogram last 2020 negative, need repeat\par - Pap smear up to date / fu with GYN \par - Colonoscopy: Normal results in 2018 per the patient\par - follow up in 6 months with repeat lab work and prn

## 2023-03-16 DIAGNOSIS — R73.03 PREDIABETES: ICD-10-CM

## 2023-03-16 DIAGNOSIS — D64.9 ANEMIA, UNSPECIFIED: ICD-10-CM

## 2023-03-16 DIAGNOSIS — G40.909 EPILEPSY, UNSPECIFIED, NOT INTRACTABLE, WITHOUT STATUS EPILEPTICUS: ICD-10-CM

## 2023-03-16 DIAGNOSIS — E78.5 HYPERLIPIDEMIA, UNSPECIFIED: ICD-10-CM

## 2023-03-16 LAB
LEVETIRACETAM SERPL-MCNC: 25.7 UG/ML
OXCARBAZEPINE SERPL-MCNC: 24 UG/ML

## 2023-03-21 ENCOUNTER — APPOINTMENT (OUTPATIENT)
Dept: NEUROLOGY | Facility: CLINIC | Age: 58
End: 2023-03-21
Payer: MEDICAID

## 2023-03-21 ENCOUNTER — OUTPATIENT (OUTPATIENT)
Dept: OUTPATIENT SERVICES | Facility: HOSPITAL | Age: 58
LOS: 1 days | End: 2023-03-21
Payer: MEDICAID

## 2023-03-21 VITALS
SYSTOLIC BLOOD PRESSURE: 117 MMHG | WEIGHT: 147.9 LBS | TEMPERATURE: 98.4 F | HEART RATE: 101 BPM | BODY MASS INDEX: 27.93 KG/M2 | HEIGHT: 61 IN | OXYGEN SATURATION: 98 % | DIASTOLIC BLOOD PRESSURE: 87 MMHG

## 2023-03-21 DIAGNOSIS — Z00.00 ENCOUNTER FOR GENERAL ADULT MEDICAL EXAMINATION WITHOUT ABNORMAL FINDINGS: ICD-10-CM

## 2023-03-21 DIAGNOSIS — Z90.710 ACQUIRED ABSENCE OF BOTH CERVIX AND UTERUS: Chronic | ICD-10-CM

## 2023-03-21 DIAGNOSIS — G40.909 EPILEPSY, UNSPECIFIED, NOT INTRACTABLE, WITHOUT STATUS EPILEPTICUS: ICD-10-CM

## 2023-03-21 PROCEDURE — 99213 OFFICE O/P EST LOW 20 MIN: CPT

## 2023-03-21 NOTE — HISTORY OF PRESENT ILLNESS
[FreeTextEntry1] : 56 yo woman pmh seizures 2/2 AVM resection 2005, anemia, MELITA, bipolar disorder presents for follow up. Last seen in clinic Feb 28 2023 for breakthrough seizures due to missed doses also found to have elevated AED  (keppra, depakote @126) and ammonia levels however labs were drawn after morning doses was taken. Given breakthrough, oxcarbazepine was increased to 600mg BID with continuation of keppra 750mg BID, depakote 500mg BID, Levocarnitine 330mg BID. \par \par In end of feb it was recommended to incr oxcarb to 60mg BID but felt vertigo and slurred speech so went back to 300mg TID. Partner now ensures she takes all her the pills and with food. Last seizure was 3 weeks prior to last appointment. \par Labs drawn March 9: Ammonia 73 (82 Feb 1), BUN 21 (22-24). VPA 91 (126), Trileptal 24, Keppra 25.7 (78.1) \par \par Feels well this appointment. Denied worsening of her weakness, vertigo.

## 2023-03-21 NOTE — ASSESSMENT
[FreeTextEntry1] : 57F seizures 2/2 AVM resection 2005, bipolar disorder, anemia, MELITA,  presents for follow up after AED adjustment for breakthrough seizures which are likely due to medication nonadherence or missing pills she threw to her mouth. She did not tolerate increase in trileptal and resumed prior dose. Now seizure-free for over 1 month attributed to boyfriend's ensuring she is taking her pills at scheduled times and actually ingesting them.\par \par #Seizure disorder 2/2 R parietal AVM s/p craniotomy 2005 \par - AED levels wnl March 9\par - Encouraged to continue compliance\par - Continue keppra 1500mg BID, depakote 500 mg BID, levocarnitine 330mg BID\par - Continue Trileptal 300mg TID\par - RTC in 6 months\par

## 2023-03-21 NOTE — PHYSICAL EXAM
[FreeTextEntry1] : GEN: NAD. AAOx3\par MS: Language intact. Answering questions appropriately. \par CN: CNII-XII intact. PERRL. 1 beat nystagmus on lateral gaze. Visual field exam limited by concentration\par MOTOR: 4/5 LUE 4/5 LLE both with spasticity. Inward turn of LLE. 5/5 RUE RLE. \par REFL: 3+ Biceps 3+ patellar with some inconsistency\par SENS: 85% to LT of left face LUE LLE compared to right side\par COORD: Shaking of head. Tremor with movement of head, UE LE. \par GAIT: Spastic gait of LLE\par

## 2023-03-21 NOTE — END OF VISIT
[FreeTextEntry3] : Patient with history of seizures and likely latest breakthrough seizure from missing doses of her AEDs\par High levels likely form taking meds prior to lab draw\par \par Plan as above [Time Spent: ___ minutes] : I have spent [unfilled] minutes of time on the encounter.

## 2023-03-22 NOTE — DISCUSSION/SUMMARY
[Plan Review] : Plan Review [Able to manage surrounding demands and opportunities] : able to manage surrounding demands and opportunities [Adherent to treatment recommendations] : adherent to treatment recommendations [Articulate] : articulate [Attempting to realize their potential] : Attempting to realize their potential [Awareness of substance use issues] : awareness of substance use issues [Cognitively intact] : cognitively intact [Good impulse control] : good impulse control [Insightful] : insightful [Motivated to participate in treatment] : motivated to participate in treatment [Health literacy] : health literacy [Motivated to maintain or improve physical health] : motivated to maintain or improve physical health [In good health] : in good health [Part of a supportive family] : part of a supportive family [Housing stability] : housing stability [English fluency] : English fluency [Connected to healthcare] : connected to healthcare [Access to safe outdoor spaces] : access to safe outdoor spaces [Social supports] : social supports [Mental Health] : Mental Health [Physical Health] : Physical Health [Other: ___] : [unfilled] [Improvement in symptoms as evidenced by:] : Improvement in symptoms as evidenced by: [Yes] : Yes [Psychiatric Provider/Prescriber] : Psychiatric Provider/Prescriber [FreeTextEntry1] : Anxiety / Insomnia [de-identified] : She endorses improved sleep which has improved her mood symptoms. [de-identified] : She follow up with neurologist and continues to take antiseizure medication. [de-identified] : Remission of anxiety symptoms and insomnia.

## 2023-03-22 NOTE — DISCUSSION/SUMMARY
[Initial Plan] : Initial Plan [Able to manage surrounding demands and opportunities] : able to manage surrounding demands and opportunities [Adherent to treatment recommendations] : adherent to treatment recommendations [Articulate] : articulate [Attempting to realize their potential] : Attempting to realize their potential [Awareness of substance use issues] : awareness of substance use issues [Cognitively intact] : cognitively intact [Good impulse control] : good impulse control [Insightful] : insightful [Motivated to participate in treatment] : motivated to participate in treatment [Health literacy] : health literacy [Motivated to maintain or improve physical health] : motivated to maintain or improve physical health [In good health] : in good health [Part of a supportive family] : part of a supportive family [Housing stability] : housing stability [English fluency] : English fluency [Connected to healthcare] : connected to healthcare [Access to safe outdoor spaces] : access to safe outdoor spaces [Social supports] : social supports [FreeTextEntry2] : 11/2022 [Mental Health] : Mental Health [Physical Health] : Physical Health [every ___ months] : every [unfilled] months [Improvement in symptoms as evidenced by:] : Improvement in symptoms as evidenced by: [Yes] : Yes [Psychiatric Provider/Prescriber] : Psychiatric Provider/Prescriber [FreeTextEntry1] : Anxiety / Insomnia [de-identified] : Hydroxyzine will be titrated up to address insomnia. [de-identified] : She reports two recent seizures, she continues to follow up with neurologist. [de-identified] : Remission of anxiety symptoms

## 2023-04-12 ENCOUNTER — RESULT REVIEW (OUTPATIENT)
Age: 58
End: 2023-04-12

## 2023-04-12 ENCOUNTER — OUTPATIENT (OUTPATIENT)
Dept: OUTPATIENT SERVICES | Facility: HOSPITAL | Age: 58
LOS: 1 days | End: 2023-04-12
Payer: MEDICAID

## 2023-04-12 DIAGNOSIS — Z12.31 ENCOUNTER FOR SCREENING MAMMOGRAM FOR MALIGNANT NEOPLASM OF BREAST: ICD-10-CM

## 2023-04-12 DIAGNOSIS — Z90.710 ACQUIRED ABSENCE OF BOTH CERVIX AND UTERUS: Chronic | ICD-10-CM

## 2023-04-12 PROCEDURE — 77063 BREAST TOMOSYNTHESIS BI: CPT | Mod: 26

## 2023-04-12 PROCEDURE — 77067 SCR MAMMO BI INCL CAD: CPT

## 2023-04-12 PROCEDURE — 77063 BREAST TOMOSYNTHESIS BI: CPT

## 2023-04-12 PROCEDURE — 77067 SCR MAMMO BI INCL CAD: CPT | Mod: 26

## 2023-04-13 DIAGNOSIS — Z12.31 ENCOUNTER FOR SCREENING MAMMOGRAM FOR MALIGNANT NEOPLASM OF BREAST: ICD-10-CM

## 2023-04-21 ENCOUNTER — APPOINTMENT (OUTPATIENT)
Dept: PSYCHIATRY | Facility: CLINIC | Age: 58
End: 2023-04-21

## 2023-05-01 ENCOUNTER — OUTPATIENT (OUTPATIENT)
Dept: OUTPATIENT SERVICES | Facility: HOSPITAL | Age: 58
LOS: 1 days | End: 2023-05-01
Payer: MEDICAID

## 2023-05-01 ENCOUNTER — NON-APPOINTMENT (OUTPATIENT)
Age: 58
End: 2023-05-01

## 2023-05-01 ENCOUNTER — APPOINTMENT (OUTPATIENT)
Dept: INTERNAL MEDICINE | Facility: CLINIC | Age: 58
End: 2023-05-01
Payer: MEDICAID

## 2023-05-01 VITALS
OXYGEN SATURATION: 96 % | WEIGHT: 149 LBS | TEMPERATURE: 96.4 F | HEART RATE: 99 BPM | HEIGHT: 61 IN | SYSTOLIC BLOOD PRESSURE: 110 MMHG | DIASTOLIC BLOOD PRESSURE: 75 MMHG | BODY MASS INDEX: 28.13 KG/M2

## 2023-05-01 DIAGNOSIS — D64.9 ANEMIA, UNSPECIFIED: ICD-10-CM

## 2023-05-01 DIAGNOSIS — Z00.00 ENCOUNTER FOR GENERAL ADULT MEDICAL EXAMINATION WITHOUT ABNORMAL FINDINGS: ICD-10-CM

## 2023-05-01 DIAGNOSIS — Z90.710 ACQUIRED ABSENCE OF BOTH CERVIX AND UTERUS: Chronic | ICD-10-CM

## 2023-05-01 PROCEDURE — 99213 OFFICE O/P EST LOW 20 MIN: CPT | Mod: GC

## 2023-05-01 PROCEDURE — 99213 OFFICE O/P EST LOW 20 MIN: CPT

## 2023-05-01 RX ORDER — NEOMYCIN SULFATE, POLYMYXIN B SULFATE AND HYDROCORTISONE 3.5; 10000; 1 MG/ML; [IU]/ML; MG/ML
3.5-10000-1 SOLUTION AURICULAR (OTIC) 4 TIMES DAILY
Qty: 1 | Refills: 0 | Status: DISCONTINUED | COMMUNITY
Start: 2021-12-17 | End: 2023-05-01

## 2023-05-01 RX ORDER — VITAMIN E MIXED 1000 UNIT
500-200 CAPSULE ORAL DAILY
Qty: 30 | Refills: 3 | Status: DISCONTINUED | COMMUNITY
Start: 2018-10-16 | End: 2023-05-01

## 2023-05-01 NOTE — ASSESSMENT
[FreeTextEntry1] : 59 y/o F w/ pmhx known to have Seizure 2/2 AVM since 1986, asthma, prediabetes, EVERETTE off iron, Urinary incontinence s/p pessary by Uro gyn. Here for routine follow up.\par \par # Brain mass and AVM s/p resection - Mild left hemimotor syndrome\par # Seizure disorder\par - on anti seizure medication (Keppra, depakote, and oxcarbazepine)\par - Valproate stopped by neurlogy due to elevated ammonia levels\par - on levocarnitine due to high ammonia levels likely secondary to side effect of valproate \par - follows with neuro\par \par # Bipolar disorder while on SSRI \par - follows with Psych \par \par # Asthma \par - educated on proper inhaler use, was using symbicort 1x/dsy instead of BID\par - c/w Albuterol and Symbicort. 160/4.5\par \par # Prediabetes - Counseled on exercise & diet; \par - a1c 6.1% 10/31/22\par \par # DLD - Advised lifestyle modification and dietary modifications. \par - lipid profile 10/31/22: , Chol 247, HDL 61,  \par - c/w atorvastatin 10mg \par \par # h/o of stress incontinence - Follows w/ GYN\par \par # HCM\par - Mammogram 4/12/23 BIRADS 1 \par - Pap smear up to date / fu with GYN \par - Colonoscopy: Normal results in 2018 per the patient\par - follow up in 6 months with repeat lab work and prn.

## 2023-05-01 NOTE — PHYSICAL EXAM
[No Acute Distress] : no acute distress [Well Nourished] : well nourished [Well Developed] : well developed [Normal Sclera/Conjunctiva] : normal sclera/conjunctiva [EOMI] : extraocular movements intact [Normal Outer Ear/Nose] : the outer ears and nose were normal in appearance [Normal Oropharynx] : the oropharynx was normal [No JVD] : no jugular venous distention [Supple] : supple [No Respiratory Distress] : no respiratory distress  [No Accessory Muscle Use] : no accessory muscle use [Clear to Auscultation] : lungs were clear to auscultation bilaterally [Regular Rhythm] : with a regular rhythm [No Varicosities] : no varicosities [No Edema] : there was no peripheral edema [Soft] : abdomen soft [Non Tender] : non-tender [Normal Bowel Sounds] : normal bowel sounds [No Joint Swelling] : no joint swelling [Grossly Normal Strength/Tone] : grossly normal strength/tone [No Rash] : no rash [No Skin Lesions] : no skin lesions [Coordination Grossly Intact] : coordination grossly intact [No Focal Deficits] : no focal deficits [Normal Gait] : normal gait

## 2023-05-01 NOTE — HISTORY OF PRESENT ILLNESS
[FreeTextEntry1] : follow up  [de-identified] : 59 y/o F w/ pmhx known to have Seizure 2/2 AVM since 1986, asthma, prediabetes, EVERETTE off iron, Urinary incontinence s/p pessary by Uro gyn. Here for routine follow up.Needs forms filled out

## 2023-05-01 NOTE — REVIEW OF SYSTEMS
[Fever] : no fever [Chills] : no chills [Discharge] : no discharge [Itching] : no itching [Nasal Discharge] : no nasal discharge [Sore Throat] : no sore throat [Chest Pain] : no chest pain [Palpitations] : no palpitations [Shortness Of Breath] : no shortness of breath [Cough] : no cough [Abdominal Pain] : no abdominal pain [Nausea] : no nausea [Constipation] : no constipation [Itching] : no itching [Skin Rash] : no skin rash [Headache] : no headache [Dizziness] : no dizziness

## 2023-05-03 ENCOUNTER — NON-APPOINTMENT (OUTPATIENT)
Age: 58
End: 2023-05-03

## 2023-05-04 DIAGNOSIS — R73.03 PREDIABETES: ICD-10-CM

## 2023-05-04 DIAGNOSIS — D64.9 ANEMIA, UNSPECIFIED: ICD-10-CM

## 2023-05-04 DIAGNOSIS — J45.909 UNSPECIFIED ASTHMA, UNCOMPLICATED: ICD-10-CM

## 2023-05-04 DIAGNOSIS — E78.5 HYPERLIPIDEMIA, UNSPECIFIED: ICD-10-CM

## 2023-05-05 ENCOUNTER — OUTPATIENT (OUTPATIENT)
Dept: OUTPATIENT SERVICES | Facility: HOSPITAL | Age: 58
LOS: 1 days | End: 2023-05-05
Payer: MEDICAID

## 2023-05-05 ENCOUNTER — APPOINTMENT (OUTPATIENT)
Dept: PSYCHIATRY | Facility: CLINIC | Age: 58
End: 2023-05-05
Payer: MEDICAID

## 2023-05-05 DIAGNOSIS — F41.1 GENERALIZED ANXIETY DISORDER: ICD-10-CM

## 2023-05-05 DIAGNOSIS — Z90.710 ACQUIRED ABSENCE OF BOTH CERVIX AND UTERUS: Chronic | ICD-10-CM

## 2023-05-05 DIAGNOSIS — G47.00 INSOMNIA, UNSPECIFIED: ICD-10-CM

## 2023-05-05 DIAGNOSIS — F33.1 MAJOR DEPRESSIVE DISORDER, RECURRENT, MODERATE: ICD-10-CM

## 2023-05-05 PROCEDURE — 99213 OFFICE O/P EST LOW 20 MIN: CPT | Mod: 95

## 2023-05-05 NOTE — PLAN
[Medication education provided] : Medication education provided. [Rationale for medication choices, possible risks/precautions, benefits, alternative treatment choices, and consequences of non-treatment discussed] : Rationale for medication choices, possible risks/precautions, benefits, alternative treatment choices, and consequences of non-treatment discussed with patient/family/caregiver  [FreeTextEntry4] : \par She will maintain stability in sleep. \par She will experience improvement in anxiety.

## 2023-05-05 NOTE — SOCIAL HISTORY
[With Significant Other] : lives with significant other [Unemployed] : unemployed [Committed Relationship] : in a committed relationship [] :  [Yes] : yes [No Known Use] : no known use [FreeTextEntry4] : 10th grade [FreeTextEntry1] : Patient was born, raised in [place]. She was employed as a  for thirty yrs. She has 19yo daughter, 35yo son.  She was  for seven years. Her parents reside in [place], two sisters in [place] and one sister on .  [TextBox_7] : Rarely

## 2023-05-05 NOTE — DISCUSSION/SUMMARY
[FreeTextEntry1] : Yecenia is a 57yo  female, history of anxiety,  PMHx of seizure dx secondary to AVM resection 2005.   She sleeps well, her mood has been stable, anxiety symptoms have improved with initiation of  Hydroxyzine. As her symptoms are stable, she is in agreement to be referred to PMD, Dr. Carroll.  She is low risk, no acute thoughts of harm to self, adherent with medication and linked to treatment.\par \par Hydroxyzine 25mg am + 50mg po bedtime \par \par

## 2023-05-05 NOTE — REASON FOR VISIT
[Home] : at home, [unfilled] , at the time of the visit. [Medical Office: ([facility])___] : at the medical office located in  [Patient] : the patient [FreeTextEntry1] : Follow up for anxiety symptoms.

## 2023-05-06 DIAGNOSIS — F41.1 GENERALIZED ANXIETY DISORDER: ICD-10-CM

## 2023-05-06 DIAGNOSIS — G47.00 INSOMNIA, UNSPECIFIED: ICD-10-CM

## 2023-05-30 ENCOUNTER — APPOINTMENT (OUTPATIENT)
Dept: NEUROLOGY | Facility: CLINIC | Age: 58
End: 2023-05-30

## 2023-06-08 ENCOUNTER — NON-APPOINTMENT (OUTPATIENT)
Age: 58
End: 2023-06-08

## 2023-07-28 ENCOUNTER — OUTPATIENT (OUTPATIENT)
Dept: OUTPATIENT SERVICES | Facility: HOSPITAL | Age: 58
LOS: 1 days | End: 2023-07-28
Payer: MEDICAID

## 2023-07-28 ENCOUNTER — APPOINTMENT (OUTPATIENT)
Dept: PSYCHIATRY | Facility: CLINIC | Age: 58
End: 2023-07-28
Payer: MEDICAID

## 2023-07-28 DIAGNOSIS — Z90.710 ACQUIRED ABSENCE OF BOTH CERVIX AND UTERUS: Chronic | ICD-10-CM

## 2023-07-28 DIAGNOSIS — F41.1 GENERALIZED ANXIETY DISORDER: ICD-10-CM

## 2023-07-28 DIAGNOSIS — G47.00 INSOMNIA, UNSPECIFIED: ICD-10-CM

## 2023-07-28 DIAGNOSIS — F33.1 MAJOR DEPRESSIVE DISORDER, RECURRENT, MODERATE: ICD-10-CM

## 2023-07-28 PROCEDURE — 99213 OFFICE O/P EST LOW 20 MIN: CPT | Mod: 95

## 2023-07-28 RX ORDER — HYDROXYZINE PAMOATE 25 MG/1
25 CAPSULE ORAL
Qty: 90 | Refills: 2 | Status: ACTIVE | COMMUNITY
Start: 2022-06-14 | End: 1900-01-01

## 2023-07-28 NOTE — SOCIAL HISTORY
[With Significant Other] : lives with significant other [Unemployed] : unemployed [Committed Relationship] : in a committed relationship [] :  [Yes] : yes [No Known Use] : no known use [FreeTextEntry4] : 10th grade [FreeTextEntry1] : Patient was born, raised in [place]. She was employed as a  for thirty yrs. She has 21yo daughter, 37yo son.  She was  for seven years. Her parents reside in [place], two sisters in [place] and one sister on .  [TextBox_7] : Rarely

## 2023-07-28 NOTE — HISTORY OF PRESENT ILLNESS
[FreeTextEntry1] : She sleeps well, her mood and anxiety symptoms are stable. She experienced another seizure after an argument with her boyfriend.  She was angry, frustrated and struggles with psychosocial stressors due to her relationship.  She has good appetite.  She has follow up appointment with neurologist, she attends twice weekly PT due to left sided weakness.  She experiences memory changes.  She is adherent with Hydroxyzine, denies side effects. Presently, she denies thoughts of harm to self.  Total time in session : Twenty minutes.\par

## 2023-07-28 NOTE — DISCUSSION/SUMMARY
[Date of Last Physical Exam: _____] : Date of Last Physical Exam: [unfilled] [Date of Last Annual Labs: _____] : Date of Last Annual Labs: [unfilled] [FreeTextEntry1] : Yecenia is a 57yo  female, history of anxiety,  PMHx of seizure dx secondary to AVM resection 2005.   She sleeps well, her mood has been stable.  She is low risk, no acute thoughts of harm to self, adherent with medication and linked to treatment.\par \par Hydroxyzine 25mg am + 50mg po bedtime \par \par

## 2023-07-28 NOTE — REASON FOR VISIT
[Home] : at home, [unfilled] , at the time of the visit. [Patient] : the patient [FreeTextEntry1] : Follow up for anxiety symptoms. [Other Location: e.g. Home (Enter Location, City,State)___] : at [unfilled]

## 2023-07-28 NOTE — PHYSICAL EXAM
[Average] : average [Cooperative] : cooperative [Euthymic] : euthymic [Full] : full [Clear] : clear [Overproductive] : overproductive [Linear/Goal Directed] : linear/goal directed [WNL] : within normal limits [FreeTextEntry1] : Casually dressed

## 2023-07-29 DIAGNOSIS — G47.00 INSOMNIA, UNSPECIFIED: ICD-10-CM

## 2023-07-29 DIAGNOSIS — F41.1 GENERALIZED ANXIETY DISORDER: ICD-10-CM

## 2023-07-31 LAB
ALBUMIN SERPL ELPH-MCNC: 4.3 G/DL
ALP BLD-CCNC: 80 U/L
ALT SERPL-CCNC: 10 U/L
AMMONIA PLAS-MCNC: 82 UMOL/L
ANION GAP SERPL CALC-SCNC: 11 MMOL/L
AST SERPL-CCNC: 11 U/L
BASOPHILS # BLD AUTO: 0.01 K/UL
BASOPHILS NFR BLD AUTO: 0.2 %
BILIRUB SERPL-MCNC: <0.2 MG/DL
BUN SERPL-MCNC: 22 MG/DL
CALCIUM SERPL-MCNC: 9.7 MG/DL
CHLORIDE SERPL-SCNC: 104 MMOL/L
CO2 SERPL-SCNC: 29 MMOL/L
CREAT SERPL-MCNC: 0.6 MG/DL
EGFR: 105 ML/MIN/1.73M2
EOSINOPHIL # BLD AUTO: 0.06 K/UL
EOSINOPHIL NFR BLD AUTO: 1 %
GLUCOSE SERPL-MCNC: 103 MG/DL
HCT VFR BLD CALC: 37.1 %
HGB BLD-MCNC: 11.9 G/DL
IMM GRANULOCYTES NFR BLD AUTO: 0.2 %
LEVETIRACETAM SERPL-MCNC: 78.1 UG/ML
LYMPHOCYTES # BLD AUTO: 3.52 K/UL
LYMPHOCYTES NFR BLD AUTO: 58.6 %
MAN DIFF?: NORMAL
MCHC RBC-ENTMCNC: 31.7 PG
MCHC RBC-ENTMCNC: 32.1 G/DL
MCV RBC AUTO: 98.9 FL
MONOCYTES # BLD AUTO: 0.65 K/UL
MONOCYTES NFR BLD AUTO: 10.8 %
NEUTROPHILS # BLD AUTO: 1.76 K/UL
NEUTROPHILS NFR BLD AUTO: 29.2 %
OXCARBAZEPINE SERPL-MCNC: 18 UG/ML
PLATELET # BLD AUTO: 154 K/UL
POTASSIUM SERPL-SCNC: 4.4 MMOL/L
PROT SERPL-MCNC: 6.8 G/DL
RBC # BLD: 3.75 M/UL
RBC # FLD: 14.4 %
SODIUM SERPL-SCNC: 144 MMOL/L
VALPROATE SERPL-MCNC: 126 UG/ML
WBC # FLD AUTO: 6.01 K/UL

## 2023-08-16 ENCOUNTER — NON-APPOINTMENT (OUTPATIENT)
Age: 58
End: 2023-08-16

## 2023-08-18 ENCOUNTER — NON-APPOINTMENT (OUTPATIENT)
Age: 58
End: 2023-08-18

## 2023-09-05 ENCOUNTER — OUTPATIENT (OUTPATIENT)
Dept: OUTPATIENT SERVICES | Facility: HOSPITAL | Age: 58
LOS: 1 days | End: 2023-09-05
Payer: MEDICAID

## 2023-09-05 DIAGNOSIS — Z90.710 ACQUIRED ABSENCE OF BOTH CERVIX AND UTERUS: Chronic | ICD-10-CM

## 2023-09-05 DIAGNOSIS — E78.5 HYPERLIPIDEMIA, UNSPECIFIED: ICD-10-CM

## 2023-09-05 LAB
ALBUMIN SERPL ELPH-MCNC: 4.2 G/DL
ALP BLD-CCNC: 82 U/L
ALT SERPL-CCNC: 14 U/L
ANION GAP SERPL CALC-SCNC: 12 MMOL/L
AST SERPL-CCNC: 9 U/L
BILIRUB SERPL-MCNC: <0.2 MG/DL
BUN SERPL-MCNC: 16 MG/DL
CALCIUM SERPL-MCNC: 9.3 MG/DL
CHLORIDE SERPL-SCNC: 104 MMOL/L
CO2 SERPL-SCNC: 25 MMOL/L
CREAT SERPL-MCNC: 0.5 MG/DL
EGFR: 109 ML/MIN/1.73M2
ESTIMATED AVERAGE GLUCOSE: 140 MG/DL
GLUCOSE SERPL-MCNC: 113 MG/DL
HBA1C MFR BLD HPLC: 6.5 %
POTASSIUM SERPL-SCNC: 4.4 MMOL/L
PROT SERPL-MCNC: 6.7 G/DL
SODIUM SERPL-SCNC: 141 MMOL/L

## 2023-09-05 PROCEDURE — 80053 COMPREHEN METABOLIC PANEL: CPT

## 2023-09-05 PROCEDURE — 85027 COMPLETE CBC AUTOMATED: CPT

## 2023-09-05 PROCEDURE — 83036 HEMOGLOBIN GLYCOSYLATED A1C: CPT

## 2023-09-05 PROCEDURE — 36415 COLL VENOUS BLD VENIPUNCTURE: CPT

## 2023-09-06 DIAGNOSIS — E78.5 HYPERLIPIDEMIA, UNSPECIFIED: ICD-10-CM

## 2023-09-07 ENCOUNTER — RX RENEWAL (OUTPATIENT)
Age: 58
End: 2023-09-07

## 2023-09-11 ENCOUNTER — OUTPATIENT (OUTPATIENT)
Dept: OUTPATIENT SERVICES | Facility: HOSPITAL | Age: 58
LOS: 1 days | End: 2023-09-11
Payer: MEDICAID

## 2023-09-11 ENCOUNTER — APPOINTMENT (OUTPATIENT)
Dept: INTERNAL MEDICINE | Facility: CLINIC | Age: 58
End: 2023-09-11
Payer: MEDICAID

## 2023-09-11 VITALS
OXYGEN SATURATION: 97 % | BODY MASS INDEX: 28.32 KG/M2 | SYSTOLIC BLOOD PRESSURE: 122 MMHG | HEIGHT: 61 IN | HEART RATE: 66 BPM | WEIGHT: 150 LBS | TEMPERATURE: 96.2 F | DIASTOLIC BLOOD PRESSURE: 82 MMHG

## 2023-09-11 DIAGNOSIS — F41.1 GENERALIZED ANXIETY DISORDER: ICD-10-CM

## 2023-09-11 DIAGNOSIS — Z90.710 ACQUIRED ABSENCE OF BOTH CERVIX AND UTERUS: Chronic | ICD-10-CM

## 2023-09-11 DIAGNOSIS — Z12.11 ENCOUNTER FOR SCREENING FOR MALIGNANT NEOPLASM OF COLON: ICD-10-CM

## 2023-09-11 DIAGNOSIS — F32.A DEPRESSION, UNSPECIFIED: ICD-10-CM

## 2023-09-11 DIAGNOSIS — Z00.00 ENCOUNTER FOR GENERAL ADULT MEDICAL EXAMINATION WITHOUT ABNORMAL FINDINGS: ICD-10-CM

## 2023-09-11 PROCEDURE — 99214 OFFICE O/P EST MOD 30 MIN: CPT

## 2023-09-11 PROCEDURE — 99214 OFFICE O/P EST MOD 30 MIN: CPT | Mod: GC

## 2023-09-11 RX ORDER — IPRATROPIUM BROMIDE AND ALBUTEROL SULFATE 2.5; .5 MG/3ML; MG/3ML
0.5-2.5 (3) SOLUTION RESPIRATORY (INHALATION)
Qty: 1 | Refills: 3 | Status: ACTIVE | COMMUNITY
Start: 2023-09-11 | End: 1900-01-01

## 2023-09-14 DIAGNOSIS — Z12.11 ENCOUNTER FOR SCREENING FOR MALIGNANT NEOPLASM OF COLON: ICD-10-CM

## 2023-09-14 DIAGNOSIS — F32.A DEPRESSION, UNSPECIFIED: ICD-10-CM

## 2023-09-14 DIAGNOSIS — G40.909 EPILEPSY, UNSPECIFIED, NOT INTRACTABLE, WITHOUT STATUS EPILEPTICUS: ICD-10-CM

## 2023-09-14 DIAGNOSIS — J45.909 UNSPECIFIED ASTHMA, UNCOMPLICATED: ICD-10-CM

## 2023-09-14 DIAGNOSIS — F41.1 GENERALIZED ANXIETY DISORDER: ICD-10-CM

## 2023-09-14 DIAGNOSIS — E11.9 TYPE 2 DIABETES MELLITUS WITHOUT COMPLICATIONS: ICD-10-CM

## 2023-09-19 ENCOUNTER — APPOINTMENT (OUTPATIENT)
Dept: NEUROLOGY | Facility: CLINIC | Age: 58
End: 2023-09-19
Payer: MEDICAID

## 2023-09-19 ENCOUNTER — OUTPATIENT (OUTPATIENT)
Dept: OUTPATIENT SERVICES | Facility: HOSPITAL | Age: 58
LOS: 1 days | End: 2023-09-19
Payer: MEDICAID

## 2023-09-19 DIAGNOSIS — Z90.710 ACQUIRED ABSENCE OF BOTH CERVIX AND UTERUS: Chronic | ICD-10-CM

## 2023-09-19 DIAGNOSIS — Z00.00 ENCOUNTER FOR GENERAL ADULT MEDICAL EXAMINATION WITHOUT ABNORMAL FINDINGS: ICD-10-CM

## 2023-09-19 PROCEDURE — 99214 OFFICE O/P EST MOD 30 MIN: CPT

## 2023-09-20 DIAGNOSIS — G40.909 EPILEPSY, UNSPECIFIED, NOT INTRACTABLE, WITHOUT STATUS EPILEPTICUS: ICD-10-CM

## 2023-09-22 ENCOUNTER — TRANSCRIPTION ENCOUNTER (OUTPATIENT)
Age: 58
End: 2023-09-22

## 2023-10-04 ENCOUNTER — APPOINTMENT (OUTPATIENT)
Dept: NEUROLOGY | Facility: CLINIC | Age: 58
End: 2023-10-04
Payer: MEDICAID

## 2023-10-04 PROCEDURE — 95816 EEG AWAKE AND DROWSY: CPT

## 2023-10-23 ENCOUNTER — INPATIENT (INPATIENT)
Facility: HOSPITAL | Age: 58
LOS: 1 days | Discharge: HOME | DRG: 53 | End: 2023-10-25
Attending: PSYCHIATRY & NEUROLOGY | Admitting: PSYCHIATRY & NEUROLOGY
Payer: MEDICAID

## 2023-10-23 VITALS
OXYGEN SATURATION: 96 % | WEIGHT: 149.91 LBS | SYSTOLIC BLOOD PRESSURE: 117 MMHG | TEMPERATURE: 98 F | HEIGHT: 61 IN | HEART RATE: 99 BPM | RESPIRATION RATE: 18 BRPM | DIASTOLIC BLOOD PRESSURE: 64 MMHG

## 2023-10-23 DIAGNOSIS — Z90.710 ACQUIRED ABSENCE OF BOTH CERVIX AND UTERUS: Chronic | ICD-10-CM

## 2023-10-23 DIAGNOSIS — G40.909 EPILEPSY, UNSPECIFIED, NOT INTRACTABLE, WITHOUT STATUS EPILEPTICUS: ICD-10-CM

## 2023-10-23 LAB
ALBUMIN SERPL ELPH-MCNC: 4.4 G/DL — SIGNIFICANT CHANGE UP (ref 3.5–5.2)
ALBUMIN SERPL ELPH-MCNC: 4.4 G/DL — SIGNIFICANT CHANGE UP (ref 3.5–5.2)
ALP SERPL-CCNC: 87 U/L — SIGNIFICANT CHANGE UP (ref 30–115)
ALP SERPL-CCNC: 87 U/L — SIGNIFICANT CHANGE UP (ref 30–115)
ALT FLD-CCNC: 18 U/L — SIGNIFICANT CHANGE UP (ref 0–41)
ALT FLD-CCNC: 18 U/L — SIGNIFICANT CHANGE UP (ref 0–41)
ANION GAP SERPL CALC-SCNC: 12 MMOL/L — SIGNIFICANT CHANGE UP (ref 7–14)
ANION GAP SERPL CALC-SCNC: 12 MMOL/L — SIGNIFICANT CHANGE UP (ref 7–14)
AST SERPL-CCNC: 21 U/L — SIGNIFICANT CHANGE UP (ref 0–41)
AST SERPL-CCNC: 21 U/L — SIGNIFICANT CHANGE UP (ref 0–41)
BASOPHILS # BLD AUTO: 0.02 K/UL — SIGNIFICANT CHANGE UP (ref 0–0.2)
BASOPHILS # BLD AUTO: 0.02 K/UL — SIGNIFICANT CHANGE UP (ref 0–0.2)
BASOPHILS NFR BLD AUTO: 0.3 % — SIGNIFICANT CHANGE UP (ref 0–1)
BASOPHILS NFR BLD AUTO: 0.3 % — SIGNIFICANT CHANGE UP (ref 0–1)
BILIRUB SERPL-MCNC: <0.2 MG/DL — SIGNIFICANT CHANGE UP (ref 0.2–1.2)
BILIRUB SERPL-MCNC: <0.2 MG/DL — SIGNIFICANT CHANGE UP (ref 0.2–1.2)
BUN SERPL-MCNC: 22 MG/DL — HIGH (ref 10–20)
BUN SERPL-MCNC: 22 MG/DL — HIGH (ref 10–20)
CALCIUM SERPL-MCNC: 10 MG/DL — SIGNIFICANT CHANGE UP (ref 8.4–10.5)
CALCIUM SERPL-MCNC: 10 MG/DL — SIGNIFICANT CHANGE UP (ref 8.4–10.5)
CHLORIDE SERPL-SCNC: 103 MMOL/L — SIGNIFICANT CHANGE UP (ref 98–110)
CHLORIDE SERPL-SCNC: 103 MMOL/L — SIGNIFICANT CHANGE UP (ref 98–110)
CO2 SERPL-SCNC: 26 MMOL/L — SIGNIFICANT CHANGE UP (ref 17–32)
CO2 SERPL-SCNC: 26 MMOL/L — SIGNIFICANT CHANGE UP (ref 17–32)
CREAT SERPL-MCNC: 0.7 MG/DL — SIGNIFICANT CHANGE UP (ref 0.7–1.5)
CREAT SERPL-MCNC: 0.7 MG/DL — SIGNIFICANT CHANGE UP (ref 0.7–1.5)
EGFR: 101 ML/MIN/1.73M2 — SIGNIFICANT CHANGE UP
EGFR: 101 ML/MIN/1.73M2 — SIGNIFICANT CHANGE UP
EOSINOPHIL # BLD AUTO: 0.04 K/UL — SIGNIFICANT CHANGE UP (ref 0–0.7)
EOSINOPHIL # BLD AUTO: 0.04 K/UL — SIGNIFICANT CHANGE UP (ref 0–0.7)
EOSINOPHIL NFR BLD AUTO: 0.6 % — SIGNIFICANT CHANGE UP (ref 0–8)
EOSINOPHIL NFR BLD AUTO: 0.6 % — SIGNIFICANT CHANGE UP (ref 0–8)
GLUCOSE SERPL-MCNC: 126 MG/DL — HIGH (ref 70–99)
GLUCOSE SERPL-MCNC: 126 MG/DL — HIGH (ref 70–99)
HCT VFR BLD CALC: 37.8 % — SIGNIFICANT CHANGE UP (ref 37–47)
HCT VFR BLD CALC: 37.8 % — SIGNIFICANT CHANGE UP (ref 37–47)
HGB BLD-MCNC: 12.5 G/DL — SIGNIFICANT CHANGE UP (ref 12–16)
HGB BLD-MCNC: 12.5 G/DL — SIGNIFICANT CHANGE UP (ref 12–16)
IMM GRANULOCYTES NFR BLD AUTO: 0.1 % — SIGNIFICANT CHANGE UP (ref 0.1–0.3)
IMM GRANULOCYTES NFR BLD AUTO: 0.1 % — SIGNIFICANT CHANGE UP (ref 0.1–0.3)
LYMPHOCYTES # BLD AUTO: 2.44 K/UL — SIGNIFICANT CHANGE UP (ref 1.2–3.4)
LYMPHOCYTES # BLD AUTO: 2.44 K/UL — SIGNIFICANT CHANGE UP (ref 1.2–3.4)
LYMPHOCYTES # BLD AUTO: 34.2 % — SIGNIFICANT CHANGE UP (ref 20.5–51.1)
LYMPHOCYTES # BLD AUTO: 34.2 % — SIGNIFICANT CHANGE UP (ref 20.5–51.1)
MAGNESIUM SERPL-MCNC: 1.7 MG/DL — LOW (ref 1.8–2.4)
MAGNESIUM SERPL-MCNC: 1.7 MG/DL — LOW (ref 1.8–2.4)
MCHC RBC-ENTMCNC: 31.4 PG — HIGH (ref 27–31)
MCHC RBC-ENTMCNC: 31.4 PG — HIGH (ref 27–31)
MCHC RBC-ENTMCNC: 33.1 G/DL — SIGNIFICANT CHANGE UP (ref 32–37)
MCHC RBC-ENTMCNC: 33.1 G/DL — SIGNIFICANT CHANGE UP (ref 32–37)
MCV RBC AUTO: 95 FL — SIGNIFICANT CHANGE UP (ref 81–99)
MCV RBC AUTO: 95 FL — SIGNIFICANT CHANGE UP (ref 81–99)
MONOCYTES # BLD AUTO: 1.04 K/UL — HIGH (ref 0.1–0.6)
MONOCYTES # BLD AUTO: 1.04 K/UL — HIGH (ref 0.1–0.6)
MONOCYTES NFR BLD AUTO: 14.6 % — HIGH (ref 1.7–9.3)
MONOCYTES NFR BLD AUTO: 14.6 % — HIGH (ref 1.7–9.3)
NEUTROPHILS # BLD AUTO: 3.59 K/UL — SIGNIFICANT CHANGE UP (ref 1.4–6.5)
NEUTROPHILS # BLD AUTO: 3.59 K/UL — SIGNIFICANT CHANGE UP (ref 1.4–6.5)
NEUTROPHILS NFR BLD AUTO: 50.2 % — SIGNIFICANT CHANGE UP (ref 42.2–75.2)
NEUTROPHILS NFR BLD AUTO: 50.2 % — SIGNIFICANT CHANGE UP (ref 42.2–75.2)
NRBC # BLD: 0 /100 WBCS — SIGNIFICANT CHANGE UP (ref 0–0)
NRBC # BLD: 0 /100 WBCS — SIGNIFICANT CHANGE UP (ref 0–0)
PLATELET # BLD AUTO: 217 K/UL — SIGNIFICANT CHANGE UP (ref 130–400)
PLATELET # BLD AUTO: 217 K/UL — SIGNIFICANT CHANGE UP (ref 130–400)
PMV BLD: 10.4 FL — SIGNIFICANT CHANGE UP (ref 7.4–10.4)
PMV BLD: 10.4 FL — SIGNIFICANT CHANGE UP (ref 7.4–10.4)
POTASSIUM SERPL-MCNC: 4.5 MMOL/L — SIGNIFICANT CHANGE UP (ref 3.5–5)
POTASSIUM SERPL-MCNC: 4.5 MMOL/L — SIGNIFICANT CHANGE UP (ref 3.5–5)
POTASSIUM SERPL-SCNC: 4.5 MMOL/L — SIGNIFICANT CHANGE UP (ref 3.5–5)
POTASSIUM SERPL-SCNC: 4.5 MMOL/L — SIGNIFICANT CHANGE UP (ref 3.5–5)
PROT SERPL-MCNC: 6.9 G/DL — SIGNIFICANT CHANGE UP (ref 6–8)
PROT SERPL-MCNC: 6.9 G/DL — SIGNIFICANT CHANGE UP (ref 6–8)
RBC # BLD: 3.98 M/UL — LOW (ref 4.2–5.4)
RBC # BLD: 3.98 M/UL — LOW (ref 4.2–5.4)
RBC # FLD: 13.3 % — SIGNIFICANT CHANGE UP (ref 11.5–14.5)
RBC # FLD: 13.3 % — SIGNIFICANT CHANGE UP (ref 11.5–14.5)
SODIUM SERPL-SCNC: 141 MMOL/L — SIGNIFICANT CHANGE UP (ref 135–146)
SODIUM SERPL-SCNC: 141 MMOL/L — SIGNIFICANT CHANGE UP (ref 135–146)
VALPROATE SERPL-MCNC: 78 UG/ML — SIGNIFICANT CHANGE UP (ref 50–100)
VALPROATE SERPL-MCNC: 78 UG/ML — SIGNIFICANT CHANGE UP (ref 50–100)
WBC # BLD: 7.14 K/UL — SIGNIFICANT CHANGE UP (ref 4.8–10.8)
WBC # BLD: 7.14 K/UL — SIGNIFICANT CHANGE UP (ref 4.8–10.8)
WBC # FLD AUTO: 7.14 K/UL — SIGNIFICANT CHANGE UP (ref 4.8–10.8)
WBC # FLD AUTO: 7.14 K/UL — SIGNIFICANT CHANGE UP (ref 4.8–10.8)

## 2023-10-23 PROCEDURE — 82962 GLUCOSE BLOOD TEST: CPT

## 2023-10-23 PROCEDURE — 99222 1ST HOSP IP/OBS MODERATE 55: CPT

## 2023-10-23 PROCEDURE — 95700 EEG CONT REC W/VID EEG TECH: CPT

## 2023-10-23 PROCEDURE — 95716 VEEG EA 12-26HR CONT MNTR: CPT

## 2023-10-23 PROCEDURE — 80053 COMPREHEN METABOLIC PANEL: CPT

## 2023-10-23 PROCEDURE — 83735 ASSAY OF MAGNESIUM: CPT

## 2023-10-23 PROCEDURE — 80177 DRUG SCRN QUAN LEVETIRACETAM: CPT

## 2023-10-23 PROCEDURE — G0378: CPT

## 2023-10-23 PROCEDURE — 80183 DRUG SCRN QUANT OXCARBAZEPIN: CPT

## 2023-10-23 PROCEDURE — 80164 ASSAY DIPROPYLACETIC ACD TOT: CPT

## 2023-10-23 PROCEDURE — 36415 COLL VENOUS BLD VENIPUNCTURE: CPT

## 2023-10-23 PROCEDURE — 85025 COMPLETE CBC W/AUTO DIFF WBC: CPT

## 2023-10-23 PROCEDURE — 99221 1ST HOSP IP/OBS SF/LOW 40: CPT

## 2023-10-23 RX ORDER — ATORVASTATIN CALCIUM 80 MG/1
1 TABLET, FILM COATED ORAL
Refills: 0 | DISCHARGE

## 2023-10-23 RX ORDER — BUDESONIDE AND FORMOTEROL FUMARATE DIHYDRATE 160; 4.5 UG/1; UG/1
0 AEROSOL RESPIRATORY (INHALATION)
Qty: 0 | Refills: 0 | DISCHARGE

## 2023-10-23 RX ORDER — METFORMIN HYDROCHLORIDE 850 MG/1
500 TABLET ORAL DAILY
Refills: 0 | Status: DISCONTINUED | OUTPATIENT
Start: 2023-10-23 | End: 2023-10-23

## 2023-10-23 RX ORDER — INFLUENZA VIRUS VACCINE 15; 15; 15; 15 UG/.5ML; UG/.5ML; UG/.5ML; UG/.5ML
0.5 SUSPENSION INTRAMUSCULAR ONCE
Refills: 0 | Status: DISCONTINUED | OUTPATIENT
Start: 2023-10-23 | End: 2023-10-25

## 2023-10-23 RX ORDER — IPRATROPIUM/ALBUTEROL SULFATE 18-103MCG
3 AEROSOL WITH ADAPTER (GRAM) INHALATION EVERY 6 HOURS
Refills: 0 | Status: DISCONTINUED | OUTPATIENT
Start: 2023-10-23 | End: 2023-10-25

## 2023-10-23 RX ORDER — ATORVASTATIN CALCIUM 80 MG/1
10 TABLET, FILM COATED ORAL AT BEDTIME
Refills: 0 | Status: DISCONTINUED | OUTPATIENT
Start: 2023-10-23 | End: 2023-10-25

## 2023-10-23 RX ORDER — ALBUTEROL 90 UG/1
2 AEROSOL, METERED ORAL
Qty: 0 | Refills: 0 | DISCHARGE

## 2023-10-23 RX ORDER — ENOXAPARIN SODIUM 100 MG/ML
40 INJECTION SUBCUTANEOUS EVERY 24 HOURS
Refills: 0 | Status: DISCONTINUED | OUTPATIENT
Start: 2023-10-23 | End: 2023-10-25

## 2023-10-23 RX ORDER — METFORMIN HYDROCHLORIDE 850 MG/1
1 TABLET ORAL
Refills: 0 | DISCHARGE

## 2023-10-23 RX ORDER — ONDANSETRON 8 MG/1
4 TABLET, FILM COATED ORAL EVERY 8 HOURS
Refills: 0 | Status: DISCONTINUED | OUTPATIENT
Start: 2023-10-23 | End: 2023-10-25

## 2023-10-23 RX ORDER — OXCARBAZEPINE 300 MG/1
300 TABLET, FILM COATED ORAL
Refills: 0 | Status: DISCONTINUED | OUTPATIENT
Start: 2023-10-23 | End: 2023-10-23

## 2023-10-23 RX ORDER — ALBUTEROL 90 UG/1
2 AEROSOL, METERED ORAL EVERY 6 HOURS
Refills: 0 | Status: DISCONTINUED | OUTPATIENT
Start: 2023-10-23 | End: 2023-10-25

## 2023-10-23 RX ORDER — LEVETIRACETAM 250 MG/1
1500 TABLET, FILM COATED ORAL EVERY 12 HOURS
Refills: 0 | Status: DISCONTINUED | OUTPATIENT
Start: 2023-10-23 | End: 2023-10-25

## 2023-10-23 RX ORDER — LEVOCARNITINE 330 MG/1
330 TABLET ORAL
Refills: 0 | Status: DISCONTINUED | OUTPATIENT
Start: 2023-10-23 | End: 2023-10-25

## 2023-10-23 RX ORDER — ACETAMINOPHEN 500 MG
650 TABLET ORAL EVERY 6 HOURS
Refills: 0 | Status: DISCONTINUED | OUTPATIENT
Start: 2023-10-23 | End: 2023-10-25

## 2023-10-23 RX ORDER — LEVETIRACETAM 250 MG/1
750 TABLET, FILM COATED ORAL
Refills: 0 | Status: DISCONTINUED | OUTPATIENT
Start: 2023-10-23 | End: 2023-10-23

## 2023-10-23 RX ORDER — SERTRALINE 25 MG/1
0 TABLET, FILM COATED ORAL
Qty: 0 | Refills: 2 | DISCHARGE

## 2023-10-23 RX ORDER — IPRATROPIUM/ALBUTEROL SULFATE 18-103MCG
0 AEROSOL WITH ADAPTER (GRAM) INHALATION
Qty: 0 | Refills: 0 | DISCHARGE

## 2023-10-23 RX ORDER — DIVALPROEX SODIUM 500 MG/1
500 TABLET, DELAYED RELEASE ORAL
Refills: 0 | Status: DISCONTINUED | OUTPATIENT
Start: 2023-10-23 | End: 2023-10-25

## 2023-10-23 RX ORDER — OXCARBAZEPINE 300 MG/1
300 TABLET, FILM COATED ORAL
Refills: 0 | Status: DISCONTINUED | OUTPATIENT
Start: 2023-10-23 | End: 2023-10-25

## 2023-10-23 RX ADMIN — OXCARBAZEPINE 300 MILLIGRAM(S): 300 TABLET, FILM COATED ORAL at 14:27

## 2023-10-23 RX ADMIN — Medication 10 MILLILITER(S): at 20:42

## 2023-10-23 RX ADMIN — OXCARBAZEPINE 300 MILLIGRAM(S): 300 TABLET, FILM COATED ORAL at 22:44

## 2023-10-23 RX ADMIN — ATORVASTATIN CALCIUM 10 MILLIGRAM(S): 80 TABLET, FILM COATED ORAL at 22:23

## 2023-10-23 RX ADMIN — METFORMIN HYDROCHLORIDE 500 MILLIGRAM(S): 850 TABLET ORAL at 14:16

## 2023-10-23 RX ADMIN — LEVOCARNITINE 330 MILLIGRAM(S): 330 TABLET ORAL at 17:37

## 2023-10-23 RX ADMIN — DIVALPROEX SODIUM 500 MILLIGRAM(S): 500 TABLET, DELAYED RELEASE ORAL at 22:23

## 2023-10-23 RX ADMIN — LEVETIRACETAM 1500 MILLIGRAM(S): 250 TABLET, FILM COATED ORAL at 22:23

## 2023-10-23 NOTE — CONSULT NOTE ADULT - NS ATTEND AMEND GEN_ALL_CORE FT
Agree with history and physical exam.  Patient is here because of breakthrough seizures, probably secondary to noncompliance.   She also reports having episodes of left side shaking/"seizure" that only comes with stress. This also raises the possibility of nonepileptic events.   Will start VEEG monitoring for further characterization.  Seizure precautions.  Continue home doses of medications, sent trough levels.

## 2023-10-23 NOTE — H&P ADULT - NS ATTEND AMEND GEN_ALL_CORE FT
57F w/ PMH of epilepsy, AVM (1986), benign brain tumor s/p resection (2005), (seizure semiology is L UE and LE shaking with no loss in consciousness lasting about 5 minutes), anemia, MELITA, bipolar disorder who presents with admission for elective vEEG.     # Epilepsy  - C/w home meds  - C/w vEEG  - Neurology to follow  - F/u levels    # Anemia  - F/u Hb on cbc     # DM  - monitor FS on bmp  - Start basal bolus if elevated    # DLD  - Lipitor     DVT ppx: Lovenox   GI ppx: None   Diet: CC  Activity:IAT  Dispo: Acute, pending

## 2023-10-23 NOTE — H&P ADULT - ASSESSMENT
57F w/ PMH of epilepsy, AVM (1986), benign brain tumor s/p resection (2005), (seizure semiology is L UE and LE shaking with no loss in consciousness lasting about 5 minutes), anemia, MELITA, bipolar disorder who presents with admission for elective vEEG.

## 2023-10-23 NOTE — H&P ADULT - NSHPPHYSICALEXAM_GEN_ALL_CORE
Vital Signs Last 24 Hrs  T(C): 36.4 (23 Oct 2023 10:30), Max: 36.4 (23 Oct 2023 10:30)  T(F): 97.5 (23 Oct 2023 10:30), Max: 97.5 (23 Oct 2023 10:30)  HR: 99 (23 Oct 2023 10:30) (99 - 99)  BP: 117/64 (23 Oct 2023 10:30) (117/64 - 117/64)  BP(mean): --  RR: 18 (23 Oct 2023 10:30) (18 - 18)  SpO2: 96% (23 Oct 2023 10:30) (96% - 96%)    Parameters below as of 23 Oct 2023 10:30  Patient On (Oxygen Delivery Method): room air      GENERAL:  57y/o Female NAD, resting comfortably.  HEAD:  Atraumatic, Normocephalic  EYES: EOMI,  conjunctiva and sclera clear  NECK: Supple,  CHEST/LUNG: Clear to auscultation bilaterally; No wheeze, rhonchi, or rales  HEART: Regular rate and rhythm; S1&S2  ABDOMEN: Soft, Nontender, Nondistended x 4 quadrants; Bowel sounds present  EXTREMITIES:   Peripheral Pulses Present, No edema, no calf tenderness  PSYCH: AAOx3, cooperative, appropriate  NEUROLOGY: WNL  SKIN: WNL

## 2023-10-23 NOTE — CONSULT NOTE ADULT - SUBJECTIVE AND OBJECTIVE BOX
Neurology/Epilepsy Consult:    SABRINA CASTRO 58y Female  MRN-[id number]    Patient is a 58y old right-handed Female who presents for elective VEEG         HPI: History obtained rom patient and boyfriend. EMR and outpatient records reviewed.    57F w/ PMH of epilepsy, AVM (), benign brain tumor s/p resection (), (seizure semiology is L UE and LE shaking with no loss in consciousness lasting about 5 minutes), anemia, MELITA, bipolar disorder who presents with admission for elective vEEG.   visited her neuro MD last month   She reports one seizure episode last month with L UE and LE shaking and no loss of consciousness that lasted about 5 minutes. She notes that sometimes she'll forget the dose of medication and ends up taking her medication later in the day. She denies any urinary incontinence and tongue biting with the seizure episodes. + on / of HA .  She denies any fevers, chills, chest pain, SOB, n/v/d. No numbness or tingling.   She notes that at baseline she is weaker on the Left side and needs a cane to ambulate.   She reports that she typically will have seizures about 3-4 times a year. (23 Oct 2023 10:32)            PAST MEDICAL & SURGICAL HISTORY:  Right Arteriovenous malformation  [facility] parietal craniotomy 2004  AVM embolization   Seizure disorder  Asthma  Mood disorder  H/O: hysterectomy            FAMILY HISTORY:  diabetes mellitus  COPD           Social History:  Lives with boyfriend and 2 children  Has HHA  Denies smoking/ETOH/recreational drug use          Allergy:  No Known Allergies        Home Medications:  ALBUTEROL HFA 90 MCG INHALER: INHALE 1 TO 2 PUFFS BY MOUTH EVERY 4 TO 6 HOURS AS NEEDED (2021 09:55)  atorvastatin 10 mg oral tablet: 1 tab(s) orally once a day at bedtime  metFORMIN 500 mg oral tablet: 1 tab(s) orally once a day in the morning  levOCARNitine 330 milliGRAM(s) Oral two times a day with meals  Hydroxyzine 25mg at bedtime  DIVALPROEX SOD  MG TAB: TAKE 1 TABLET BY MOUTH EVERY 12 HOURS   LEVETIRACETAM 750 MG TABLET: TAKE 2 TABLETS BY MOUTH EVERY 12 HOURS   OXCARBAZEPINE 300 MG TABLET: TAKE 1 TABLET BY MOUTH THREE TIMES A DAY             MEDICATIONS  (STANDING):  atorvastatin 10 milliGRAM(s) Oral at bedtime  divalproex  milliGRAM(s) Oral <User Schedule>  levETIRAcetam 1500 milliGRAM(s) Oral every 12 hours  levOCARNitine 330 milliGRAM(s) Oral two times a day with meals  metFORMIN 500 milliGRAM(s) Oral daily  OXcarbazepine 300 milliGRAM(s) Oral <User Schedule>    MEDICATIONS  (PRN):  acetaminophen     Tablet .. 650 milliGRAM(s) Oral every 6 hours PRN Temp greater or equal to 38C (100.4F), Mild Pain (1 - 3)  albuterol    90 MICROgram(s) HFA Inhaler 2 Puff(s) Inhalation every 6 hours PRN Bronchospasm  albuterol/ipratropium for Nebulization 3 milliLiter(s) Nebulizer every 6 hours PRN Bronchospasm  LORazepam   Injectable 2 milliGRAM(s) IV Push once PRN sz activity  ondansetron Injectable 4 milliGRAM(s) IV Push every 8 hours PRN Nausea and/or Vomiting            T(F): 97.5 (10-23-23 @ 10:30), Max: 97.5 (10-23-23 @ 10:30)  HR: 99 (10-23-23 @ 10:30) (99 - 99)  BP: 117/64 (10-23-23 @ 10:30) (117/64 - 117/64)  RR: 18 (10-23-23 @ 10:30) (18 - 18)  SpO2: 96% (10-23-23 @ 10:30) (96% - 96%)        Neurologic Examination:  General:  Appearance is consistent with chronologic age.  No abnormal facies.   General: The patient is oriented to person, place, time and date.  Recent and remote memory intact.  Follows 4-step directions. Fund of knowledge is intact and normal.  Language with normal repetition, comprehension and naming.  Nondysarthric.    Cranial nerves: EOMI w/o nystagmus, skew or reported double vision.  PERRL.  No ptosis/weakness of eyelid closure.  Facial sensation is normal with normal bite.  No facial asymmetry.  Hearing grossly intact b/l.  Palate elevates midline.  Tongue midline.  Motor examination:   Normal tone, bulk and range of motion.  No tenderness, twitching, tremors or involuntary movements.  Formal Muscle Strength Testin/5 UE; 5/5 LE.  No observable drift.  Reflexes:   2+ b/l pectoralis, biceps, triceps, brachioradialis, patella and Achilles.  Plantar response downgoing b/l.  Jaw jerk, Royal, clonus absent.  Sensory examination:   Intact to light touch and pinprick, pain, temperature and proprioception and vibration in all extremities.  Cerebellum:   FTN/HKS intact with normal YANNA in all limbs.  No dysmetria or dysdiadokinesia.  Gait narrow based and normal.        Labs:                                 Neuroimaging:  CT Head:   CT Angiography/Perfusion:   MRI Brain:   MRA Head/Neck:     Carotid US:         EEG:       Assessment:  This is a 58y Female with h/o         Discussed with Dr. Rees        Plan:   - VEEG monitoring for better characterization and assessment  - Seizure precautions  - Ativan 2mg IV PRN for generalized tonic-clonic seizure lasting longer than 2 minutes, or two consecutive seizures without return to baseline in-between (ordered)  - CBC, CMP, Mg, CK, AED levels trough (ordered)  - Keep Mg above 2    Plan discussed with patient in details, all questions answered.    Discussed with nursing team, hospitalist, and PA. Neurology/Epilepsy Consult:    SABRINA CASTRO 58y Female  MRN-[id number]    Patient is a 58y old right-handed Female who presents for elective VEEG         HPI: History obtained rom patient and boyfriend. EMR and outpatient records reviewed.  Patient has h/o right AVM diagnosed in 1986, underwent embolization, then in 2004 she had right parietal craniotomy. Patient is being followed at [facility] neurology clinic and is currently prescribed 3 seizure medications.   She reports having several breakthrough seizures per year, most recent event about 1-2 weeks ago. She also had a seizure at the end of August 2023.   Typical seizure is described as dizziness followed by left tonic-clonic activity lasting up to several minutes without LOC followed by headache/fatigue. No tongue bite, no bowel/bladder incontinence. Typically patient does not go to the ED, just rests at home. Patient also reports occasionally being woken up from sleep by feeling of her body jerking.  Patient states that she occasionally forgets to take a dose of seizure medications, and then typically the seizure happens later same day. Most of the time the family reminds her to take medications, in addition she uses phone alarms.  Patient reports falling asleep while watching TV, then in the morning after eating breakfast and taking medication she gets sleepy, therefore takes 2-3 hour nap.          PAST MEDICAL & SURGICAL HISTORY:  Right Arteriovenous malformation  AVM embolization 1986  Right parietal craniotomy 2004  Seizure disorder  Asthma  Anemia  Mood disorder  H/O: hysterectomy            FAMILY HISTORY:  diabetes mellitus  COPD           Social History:  Lives with boyfriend and 2 children  Has HHA  Denies smoking/ETOH/recreational drug use          Allergy:  No Known Allergies        Home Medications:  ALBUTEROL HFA 90 MCG INHALER: INHALE 1 TO 2 PUFFS BY MOUTH EVERY 4 TO 6 HOURS AS NEEDED (05 Jan 2021 09:55)  atorvastatin 10 mg oral tablet: 1 tab(s) orally once a day at bedtime  metFORMIN 500 mg oral tablet: 1 tab(s) orally once a day in the morning  levOCARNitine 330 milliGRAM(s) Oral two times a day with meals  Hydroxyzine 25mg at bedtime  DIVALPROEX SOD  MG TAB: TAKE 1 TABLET BY MOUTH EVERY 12 HOURS   LEVETIRACETAM 750 MG TABLET: TAKE 2 TABLETS BY MOUTH EVERY 12 HOURS   OXCARBAZEPINE 300 MG TABLET: TAKE 1 TABLET BY MOUTH THREE TIMES A DAY             MEDICATIONS  (STANDING):  atorvastatin 10 milliGRAM(s) Oral at bedtime  divalproex  milliGRAM(s) Oral <User Schedule>  levETIRAcetam 1500 milliGRAM(s) Oral every 12 hours  levOCARNitine 330 milliGRAM(s) Oral two times a day with meals  metFORMIN 500 milliGRAM(s) Oral daily  OXcarbazepine 300 milliGRAM(s) Oral <User Schedule>    MEDICATIONS  (PRN):  acetaminophen     Tablet .. 650 milliGRAM(s) Oral every 6 hours PRN Temp greater or equal to 38C (100.4F), Mild Pain (1 - 3)  albuterol    90 MICROgram(s) HFA Inhaler 2 Puff(s) Inhalation every 6 hours PRN Bronchospasm  albuterol/ipratropium for Nebulization 3 milliLiter(s) Nebulizer every 6 hours PRN Bronchospasm  LORazepam   Injectable 2 milliGRAM(s) IV Push once PRN sz activity  ondansetron Injectable 4 milliGRAM(s) IV Push every 8 hours PRN Nausea and/or Vomiting            T(F): 97.5 (10-23-23 @ 10:30), Max: 97.5 (10-23-23 @ 10:30)  HR: 99 (10-23-23 @ 10:30) (99 - 99)  BP: 117/64 (10-23-23 @ 10:30) (117/64 - 117/64)  RR: 18 (10-23-23 @ 10:30) (18 - 18)  SpO2: 96% (10-23-23 @ 10:30) (96% - 96%)        Neurologic Examination:  General:  Appearance is consistent with chronologic age.  No abnormal facies.   General: The patient is oriented to self, place, year and month. Follows 2-3-step directions. Language with normal repetition, comprehension and naming.  Nondysarthric.    Cranial nerves: EOMI w/o nystagmus, skew or reported double vision.  PERRL.  No ptosis/weakness of eyelid closure.  Facial sensation is normal with normal bite.  + facial asymmetry.  Hearing grossly intact b/l.  Palate elevates midline.  Tongue midline.  Motor examination:   No tenderness, twitching, tremors or involuntary movements.  Formal Muscle Strength Testing: Left hemiparesis   Reflexes:   2+ b/l   Sensory examination:   Intact to light touch and pinprick, pain, temperature .  Cerebellum:   FTN/HKS intact with normal YANNA in all limbs.  No dysmetria or dysdiadokinesia.  Gait narrow based and normal.        Labs:   3/9/2023 Keppra 25.7 mcg/ml, Oxcarbazepine 24 mcg/ml, VPA 91 mcg/ml (trough levels)        Neuroimaging:  CT Head:   < from: CT Head No Cont (09.14.22 @ 17:35) >  FINDINGS:    Stable postoperative changes reflecting right parietal craniotomy for   resection of AVM. There is stable cystic encephalomalacia in the right   frontoparietal lobe with dystrophic calcifications.    There is no evidence of acute territorial infarct or intracranial   hemorrhage. There is no space-occupying lesion or midline shift.    There is no evidence of hydrocephalus. There are no extra-axial fluid  collections.    The visualized intraorbital contents are normal. There is trace mucosal   thickening the left ethmoid sinus. The mastoid air cells are aerated.    IMPRESSION:    No acute intracranial pathology.    Stable right frontoparietal cystic encephalomalacia.    < end of copied text >          MRI Brain:   < from: MR Head w/wo IV Cont (05.30.19 @ 14:20) >  IMPRESSION:     1.  Stable exam compared to 6/1/2014.    2.  Status post right parietal craniotomy for AVM resection as per   history.    3.  Stable lobular cystic lesions within the right parietal lobe   measuring up to 3.8 cm, with stable surrounding FLAIR signal   hyperintensity. Stable patchy and curvilinear enhancement within the   surgical bed. The stability of these findings favors   postsurgical/posttreatment changes.    < end of copied text >          REEG 10/4/2023 - higher amplitude in the right hemisphere, moderate right hemispheric focal slowing  VEEG 8/23 - 8/25/2017 - right hemispheric focal slowing and breach artifact, rare right FC sharps and sharp transients.        Assessment:  This is a 58y Female with h/o AVM, s/p embolization and right parietal craniotomy for resection, seizure disorder, mood disorder, anemia, asthma, with breakthrough seizures.         Discussed with Dr. Rees        Plan:   - VEEG monitoring for further characterization and treatment plan  - Seizure precautions  - Continue pre-admission doses of seizure medications for now  - Ativan 2mg IV PRN for generalized tonic-clonic seizure lasting longer than 2 minutes, or two consecutive seizures without return to baseline in-between (ordered)  - CBC, CMP, Mg, ASM levels trough   - Keep Mg above 2    Plan discussed with patient in details, all questions answered.    Discussed with nursing team and PA. Neurology/Epilepsy Consult:    SABRINA CASTRO 58y Female  MRN-[id number]    Patient is a 58y old right-handed Female who presents for elective VEEG         HPI: History obtained rom patient and boyfriend. EMR and outpatient records reviewed.  Patient has h/o right AVM diagnosed in 1986, underwent embolization, then in 2004 she had right parietal craniotomy. Patient is being followed at [facility] and is currently prescribed 3 seizure medications.   She reports having several breakthrough seizures per year, most recent event about 1-2 weeks ago. She also had a seizure at the end of August 2023.   Typical seizure is described as dizziness followed by left tonic-clonic activity lasting up to several minutes without LOC followed by headache/fatigue. No tongue bite, no bowel/bladder incontinence. Usually patient does not go to the ED, just rests at home. Patient also reports occasionally being woken up from sleep by feeling of her body jerking.  Patient states that she occasionally forgets to take a dose of seizure medications, and then the seizure happens later same day. Most of the time the family reminds her to take medications, in addition she uses phone alarms.  Patient reports falling asleep while watching TV, then in the morning after eating breakfast and taking medication she gets sleepy, therefore takes 2-3 hour nap.          PAST MEDICAL & SURGICAL HISTORY:  Right Arteriovenous malformation  AVM embolization 1986  Right parietal craniotomy 2004  Seizure disorder  Asthma  Anemia  Mood disorder  H/O: hysterectomy            FAMILY HISTORY:  diabetes mellitus  COPD           Social History:  Lives with boyfriend and 2 children  Has HHA  Denies smoking/ETOH/recreational drug use          Allergy:  No Known Allergies        Home Medications:  ALBUTEROL HFA 90 MCG INHALER: INHALE 1 TO 2 PUFFS BY MOUTH EVERY 4 TO 6 HOURS AS NEEDED (05 Jan 2021 09:55)  atorvastatin 10 mg oral tablet: 1 tab(s) orally once a day at bedtime  metFORMIN 500 mg oral tablet: 1 tab(s) orally once a day in the morning  levOCARNitine 330 milliGRAM(s) Oral two times a day with meals  Hydroxyzine 25mg at bedtime  DIVALPROEX SOD  MG TAB: TAKE 1 TABLET BY MOUTH EVERY 12 HOURS   LEVETIRACETAM 750 MG TABLET: TAKE 2 TABLETS BY MOUTH EVERY 12 HOURS   OXCARBAZEPINE 300 MG TABLET: TAKE 1 TABLET BY MOUTH THREE TIMES A DAY             MEDICATIONS  (STANDING):  atorvastatin 10 milliGRAM(s) Oral at bedtime  divalproex  milliGRAM(s) Oral <User Schedule>  levETIRAcetam 1500 milliGRAM(s) Oral every 12 hours  levOCARNitine 330 milliGRAM(s) Oral two times a day with meals  metFORMIN 500 milliGRAM(s) Oral daily  OXcarbazepine 300 milliGRAM(s) Oral <User Schedule>    MEDICATIONS  (PRN):  acetaminophen     Tablet .. 650 milliGRAM(s) Oral every 6 hours PRN Temp greater or equal to 38C (100.4F), Mild Pain (1 - 3)  albuterol    90 MICROgram(s) HFA Inhaler 2 Puff(s) Inhalation every 6 hours PRN Bronchospasm  albuterol/ipratropium for Nebulization 3 milliLiter(s) Nebulizer every 6 hours PRN Bronchospasm  LORazepam   Injectable 2 milliGRAM(s) IV Push once PRN sz activity  ondansetron Injectable 4 milliGRAM(s) IV Push every 8 hours PRN Nausea and/or Vomiting            T(F): 97.5 (10-23-23 @ 10:30), Max: 97.5 (10-23-23 @ 10:30)  HR: 99 (10-23-23 @ 10:30) (99 - 99)  BP: 117/64 (10-23-23 @ 10:30) (117/64 - 117/64)  RR: 18 (10-23-23 @ 10:30) (18 - 18)  SpO2: 96% (10-23-23 @ 10:30) (96% - 96%)        Neurologic Examination:  General:  Appearance is consistent with chronologic age.  No abnormal facies.   General: The patient is oriented to self, place, year and month. Follows 2-3-step directions. Language with normal repetition, comprehension and naming.  Nondysarthric.    Cranial nerves: EOMI w/o nystagmus, skew or reported double vision.  PERRL.  No ptosis/weakness of eyelid closure.  Facial sensation is normal with normal bite.  + facial asymmetry.  Hearing grossly intact b/l.  Palate elevates midline.  Tongue midline.  Motor examination:   No tenderness, twitching, tremors or involuntary movements.  Formal Muscle Strength Testing: Left hemiparesis   Reflexes:   2+ b/l   Sensory examination:   Intact to light touch and pinprick, pain, temperature .  Cerebellum:   FTN/HKS intact with normal YANNA in all limbs.  No dysmetria or dysdiadokinesia.  Gait narrow based and normal.        Labs:   3/9/2023 Keppra 25.7 mcg/ml, Oxcarbazepine 24 mcg/ml, VPA 91 mcg/ml (trough levels)        Neuroimaging:  CT Head:   < from: CT Head No Cont (09.14.22 @ 17:35) >  FINDINGS:    Stable postoperative changes reflecting right parietal craniotomy for   resection of AVM. There is stable cystic encephalomalacia in the right   frontoparietal lobe with dystrophic calcifications.    There is no evidence of acute territorial infarct or intracranial   hemorrhage. There is no space-occupying lesion or midline shift.    There is no evidence of hydrocephalus. There are no extra-axial fluid  collections.    The visualized intraorbital contents are normal. There is trace mucosal   thickening the left ethmoid sinus. The mastoid air cells are aerated.    IMPRESSION:    No acute intracranial pathology.    Stable right frontoparietal cystic encephalomalacia.    < end of copied text >          MRI Brain:   < from: MR Head w/wo IV Cont (05.30.19 @ 14:20) >  IMPRESSION:     1.  Stable exam compared to 6/1/2014.    2.  Status post right parietal craniotomy for AVM resection as per   history.    3.  Stable lobular cystic lesions within the right parietal lobe   measuring up to 3.8 cm, with stable surrounding FLAIR signal   hyperintensity. Stable patchy and curvilinear enhancement within the   surgical bed. The stability of these findings favors   postsurgical/posttreatment changes.    < end of copied text >          REEG 10/4/2023 - higher amplitude in the right hemisphere, moderate right hemispheric focal slowing  VEEG 8/23 - 8/25/2017 - right hemispheric focal slowing and breach artifact, rare right FC sharps and sharp transients.        Assessment:  This is a 58y Female with h/o AVM, s/p embolization and right parietal craniotomy for resection, seizure disorder, mood disorder, anemia, asthma, with breakthrough seizures.         Discussed with Dr. Rees        Plan:   - VEEG monitoring for further characterization and treatment plan  - Seizure precautions  - Continue pre-admission doses of seizure medications for now  - Ativan 2mg IV PRN for generalized tonic-clonic seizure lasting longer than 2 minutes, or two consecutive seizures without return to baseline in-between (ordered)  - CBC, CMP, Mg, ASM levels trough   - Keep Mg above 2    Plan discussed with patient in details, all questions answered.    Discussed with nursing team and PA.

## 2023-10-23 NOTE — H&P ADULT - HISTORY OF PRESENT ILLNESS
57F w/ PMH of epilepsy, AVM (1986), benign brain tumor s/p resection (2005), (seizure semiology is L UE and LE shaking with no loss in consciousness lasting about 5 minutes), anemia, MELITA, bipolar disorder who presents with admission for elective vEEG.   visited her neuro MD last month   She reports one seizure episode last month with L UE and LE shaking and no loss of consciousness that lasted about 5 minutes. She notes that sometimes she'll forget the dose of medication and ends up taking her medication later in the day. She denies any urinary incontinence and tongue biting with the seizure episodes. + on / of HA .  She denies any fevers, chills, chest pain, SOB, n/v/d. No numbness or tingling.   She notes that at baseline she is weaker on the Left side and needs a cane to ambulate.   She reports that she typically will have seizures about 3-4 times a year.

## 2023-10-24 PROCEDURE — 99231 SBSQ HOSP IP/OBS SF/LOW 25: CPT

## 2023-10-24 PROCEDURE — 95720 EEG PHY/QHP EA INCR W/VEEG: CPT

## 2023-10-24 PROCEDURE — 99232 SBSQ HOSP IP/OBS MODERATE 35: CPT

## 2023-10-24 RX ORDER — MAGNESIUM SULFATE 500 MG/ML
2 VIAL (ML) INJECTION
Refills: 0 | Status: COMPLETED | OUTPATIENT
Start: 2023-10-24 | End: 2023-10-24

## 2023-10-24 RX ORDER — MAGNESIUM OXIDE 400 MG ORAL TABLET 241.3 MG
400 TABLET ORAL
Refills: 0 | Status: DISCONTINUED | OUTPATIENT
Start: 2023-10-24 | End: 2023-10-24

## 2023-10-24 RX ADMIN — LEVETIRACETAM 1500 MILLIGRAM(S): 250 TABLET, FILM COATED ORAL at 09:18

## 2023-10-24 RX ADMIN — Medication 25 GRAM(S): at 23:37

## 2023-10-24 RX ADMIN — DIVALPROEX SODIUM 500 MILLIGRAM(S): 500 TABLET, DELAYED RELEASE ORAL at 09:17

## 2023-10-24 RX ADMIN — LEVOCARNITINE 330 MILLIGRAM(S): 330 TABLET ORAL at 08:08

## 2023-10-24 RX ADMIN — MAGNESIUM OXIDE 400 MG ORAL TABLET 400 MILLIGRAM(S): 241.3 TABLET ORAL at 13:30

## 2023-10-24 RX ADMIN — MAGNESIUM OXIDE 400 MG ORAL TABLET 400 MILLIGRAM(S): 241.3 TABLET ORAL at 09:19

## 2023-10-24 RX ADMIN — Medication 650 MILLIGRAM(S): at 17:18

## 2023-10-24 RX ADMIN — OXCARBAZEPINE 300 MILLIGRAM(S): 300 TABLET, FILM COATED ORAL at 13:29

## 2023-10-24 RX ADMIN — ATORVASTATIN CALCIUM 10 MILLIGRAM(S): 80 TABLET, FILM COATED ORAL at 22:36

## 2023-10-24 RX ADMIN — Medication 25 GRAM(S): at 18:04

## 2023-10-24 RX ADMIN — LEVOCARNITINE 330 MILLIGRAM(S): 330 TABLET ORAL at 17:01

## 2023-10-24 RX ADMIN — Medication 650 MILLIGRAM(S): at 17:48

## 2023-10-24 RX ADMIN — OXCARBAZEPINE 300 MILLIGRAM(S): 300 TABLET, FILM COATED ORAL at 21:53

## 2023-10-24 RX ADMIN — MAGNESIUM OXIDE 400 MG ORAL TABLET 400 MILLIGRAM(S): 241.3 TABLET ORAL at 17:02

## 2023-10-24 RX ADMIN — OXCARBAZEPINE 300 MILLIGRAM(S): 300 TABLET, FILM COATED ORAL at 05:46

## 2023-10-24 RX ADMIN — DIVALPROEX SODIUM 500 MILLIGRAM(S): 500 TABLET, DELAYED RELEASE ORAL at 21:53

## 2023-10-24 RX ADMIN — LEVETIRACETAM 1500 MILLIGRAM(S): 250 TABLET, FILM COATED ORAL at 21:53

## 2023-10-24 NOTE — PROGRESS NOTE ADULT - ASSESSMENT
57F w/ PMH of epilepsy, AVM (1986), benign brain tumor s/p resection (2005), (seizure semiology is L UE and LE shaking with no loss in consciousness lasting about 5 minutes), anemia, MELITA, bipolar disorder who presents with admission for elective vEEG.     # Epilepsy  - C/w home meds  - C/w vEEG  - Neurology following  - F/u levels    # Hypomagnesemia  - Mag 1.7. Treated     # Anemia  - Hb 12.5    # DM  - monitor FS on bmp  - Start basal bolus if elevated    # DLD  - Lipitor     DVT ppx: Lovenox   GI ppx: None   Diet: CC  Activity: IAT  Dispo: From home. pending vEEG results

## 2023-10-25 ENCOUNTER — TRANSCRIPTION ENCOUNTER (OUTPATIENT)
Age: 58
End: 2023-10-25

## 2023-10-25 VITALS
SYSTOLIC BLOOD PRESSURE: 114 MMHG | HEART RATE: 81 BPM | RESPIRATION RATE: 18 BRPM | TEMPERATURE: 97 F | DIASTOLIC BLOOD PRESSURE: 59 MMHG

## 2023-10-25 PROCEDURE — 95720 EEG PHY/QHP EA INCR W/VEEG: CPT

## 2023-10-25 PROCEDURE — 99238 HOSP IP/OBS DSCHRG MGMT 30/<: CPT

## 2023-10-25 PROCEDURE — 99231 SBSQ HOSP IP/OBS SF/LOW 25: CPT

## 2023-10-25 RX ORDER — LEVETIRACETAM 250 MG/1
2 TABLET, FILM COATED ORAL
Qty: 120 | Refills: 2
Start: 2023-10-25 | End: 2024-01-22

## 2023-10-25 RX ORDER — OXCARBAZEPINE 300 MG/1
450 TABLET, FILM COATED ORAL EVERY 12 HOURS
Refills: 0 | Status: DISCONTINUED | OUTPATIENT
Start: 2023-10-25 | End: 2023-10-25

## 2023-10-25 RX ORDER — LEVETIRACETAM 250 MG/1
0 TABLET, FILM COATED ORAL
Qty: 0 | Refills: 2
Start: 2023-10-25 | End: 2024-01-22

## 2023-10-25 RX ORDER — DIVALPROEX SODIUM 500 MG/1
1 TABLET, DELAYED RELEASE ORAL
Qty: 60 | Refills: 2
Start: 2023-10-25 | End: 2024-01-22

## 2023-10-25 RX ORDER — DIVALPROEX SODIUM 500 MG/1
0 TABLET, DELAYED RELEASE ORAL
Qty: 0 | Refills: 2 | DISCHARGE

## 2023-10-25 RX ORDER — OXCARBAZEPINE 300 MG/1
0 TABLET, FILM COATED ORAL
Qty: 0 | Refills: 4 | DISCHARGE

## 2023-10-25 RX ORDER — OXCARBAZEPINE 300 MG/1
1.5 TABLET, FILM COATED ORAL
Qty: 90 | Refills: 2
Start: 2023-10-25 | End: 2024-01-22

## 2023-10-25 RX ORDER — LEVOCARNITINE 330 MG/1
3 TABLET ORAL
Qty: 180 | Refills: 2
Start: 2023-10-25 | End: 2024-01-22

## 2023-10-25 RX ORDER — VALPROIC ACID (AS SODIUM SALT) 250 MG/5ML
0 SOLUTION, ORAL ORAL
Qty: 0 | Refills: 0 | DISCHARGE

## 2023-10-25 RX ORDER — OXCARBAZEPINE 300 MG/1
150 TABLET, FILM COATED ORAL ONCE
Refills: 0 | Status: COMPLETED | OUTPATIENT
Start: 2023-10-25 | End: 2023-10-25

## 2023-10-25 RX ORDER — ALBUTEROL 90 UG/1
0 AEROSOL, METERED ORAL
Qty: 0 | Refills: 2 | DISCHARGE

## 2023-10-25 RX ORDER — LEVETIRACETAM 250 MG/1
0 TABLET, FILM COATED ORAL
Qty: 0 | Refills: 2 | DISCHARGE

## 2023-10-25 RX ORDER — LEVOCARNITINE 330 MG/1
1 TABLET ORAL
Qty: 60 | Refills: 2
Start: 2023-10-25 | End: 2024-01-22

## 2023-10-25 RX ADMIN — DIVALPROEX SODIUM 500 MILLIGRAM(S): 500 TABLET, DELAYED RELEASE ORAL at 10:03

## 2023-10-25 RX ADMIN — LEVOCARNITINE 330 MILLIGRAM(S): 330 TABLET ORAL at 09:00

## 2023-10-25 RX ADMIN — OXCARBAZEPINE 150 MILLIGRAM(S): 300 TABLET, FILM COATED ORAL at 11:04

## 2023-10-25 RX ADMIN — OXCARBAZEPINE 300 MILLIGRAM(S): 300 TABLET, FILM COATED ORAL at 06:11

## 2023-10-25 RX ADMIN — LEVETIRACETAM 1500 MILLIGRAM(S): 250 TABLET, FILM COATED ORAL at 10:03

## 2023-10-25 NOTE — DISCHARGE NOTE NURSING/CASE MANAGEMENT/SOCIAL WORK - NSDCVIVACCINE_GEN_ALL_CORE_FT
Tdap; 14-Sep-2022 19:30; Gypsy Webb ([facility]); [employer]; [id number] (Exp. Date: 14-Oct-2024); IntraMuscular; Deltoid Right.; 0.5 milliLiter(s); VIS (VIS Published: 09-May-2013, VIS Presented: 14-Sep-2022);

## 2023-10-25 NOTE — DISCHARGE NOTE PROVIDER - NSDCCPCAREPLAN_GEN_ALL_CORE_FT
PRINCIPAL DISCHARGE DIAGNOSIS  Diagnosis: Seizure  Assessment and Plan of Treatment: Follow up at [facility] as previously scheduled December 19, 2023 at 1 pm.  [address] [place] [address]  [phone]  Please have CBC, CMP, ASM levels trough done prior to follow up.  You are being dischanged on:  Keppra 1500mg q12hrs (pre-admission dose)  Depakote 500mg q12hrs (pre-admission dose)  Trileptal 450mg q12hrs (same total daily dose, changed to twice daily to improve compliance)  Rx sent to the pharmacy.  Please continue Carnitor 330mg twice daily with food. Rx sent.  Detailed written and verbal instructions regarding seizure precautions and follow up plan are given to you by neurology team.     PRINCIPAL DISCHARGE DIAGNOSIS  Diagnosis: Seizure  Assessment and Plan of Treatment: You were monitored on VEEG in the hospital. Your meds are adjusted on discharged.   Follow up at [facility] as previously scheduled December 19, 2023 at 1 pm.  [address] [place] [address]  [phone]  Please have CBC, CMP, ASM levels trough done prior to follow up.  You are being dischanged on:  Keppra 1500mg q12hrs (pre-admission dose)  Depakote 500mg q12hrs (pre-admission dose)  Trileptal 450mg q12hrs (same total daily dose, changed to twice daily to improve compliance)  Rx sent to the pharmacy.  Please continue Carnitor 330mg twice daily with food. Rx sent.  Detailed written and verbal instructions regarding seizure precautions and follow up plan are given to you by neurology team.

## 2023-10-25 NOTE — DISCHARGE NOTE PROVIDER - NSDCDCMDCOMP_GEN_ALL_CORE
This document is complete and the patient is ready for discharge.
airway patent/breath sounds equal/good air movement/respirations non-labored/clear to auscultation bilaterally

## 2023-10-25 NOTE — DISCHARGE NOTE PROVIDER - NSDCMRMEDTOKEN_GEN_ALL_CORE_FT
ALBUTEROL HFA 90 MCG INHALER: every 4 hours as needed for  bronchospasm INHALE 1 TO 2 PUFFS BY MOUTH EVERY 4 TO 6 HOURS AS NEEDED  Alivio 600 mg oral tablet: 1 tab(s) orally every 6 hours, As Needed   atorvastatin 10 mg oral tablet: 1 tab(s) orally once a day  Depakote 500 mg oral delayed release tablet: 1 tab(s) orally every 12 hours x 30 days  Keppra 750 mg oral tablet: 2 tab(s) orally every 12 hours x 30 days  levOCARNitine 330 mg oral tablet: 1 tab(s) orally 2 times a day (with meals)  metFORMIN 500 mg oral tablet: 1 tab(s) orally once a day  oxycodone-acetaminophen 5 mg-325 mg oral tablet: 1 tab(s) orally every 6 hours, As Needed MDD:4 pills a day   Trileptal 300 mg oral tablet: 1.5 tab(s) orally every 12 hours x 30 days

## 2023-10-25 NOTE — DISCHARGE NOTE NURSING/CASE MANAGEMENT/SOCIAL WORK - PATIENT PORTAL LINK FT
You can access the FollowMyHealth Patient Portal offered by [facility] by registering at the following website: [email or web address]. By joining [facility]’s FollowMyHealth portal, you will also be able to view your health information using other applications (apps) compatible with our system.

## 2023-10-25 NOTE — DISCHARGE NOTE PROVIDER - ATTENDING DISCHARGE PHYSICAL EXAMINATION:
VITALS:  T(F): 96.7 (10-25-23 @ 05:09), Max: 96.7 (10-25-23 @ 05:09)  HR: 81 (10-25-23 @ 05:09) (81 - 99)  BP: 114/59 (10-25-23 @ 05:09) (114/59 - 124/75)  RR: 18 (10-25-23 @ 05:09) (18 - 18)  SpO2: 95% (10-24-23 @ 20:30) (95% - 95%)      PHYSICAL EXAM:  GENERAL: NAD  HEAD: atrauamtic, normocephalic  EYES: EOMI  ENT: moist mucus membranes  CHEST/LUNG: CTAB  HEART: Regular rate and rhythm; s1 s2 appreciated, No murmurs, rubs, or gallops  ABDOMEN: Soft, Nontender, Nondistended; Bowel sounds present  EXTREMITIES: No LE edema b/l  SKIN: no rashes, no new lesions  NERVOUS SYSTEM:  Alert & Oriented X3

## 2023-10-25 NOTE — DISCHARGE NOTE NURSING/CASE MANAGEMENT/SOCIAL WORK - NSDCPEFALRISK_GEN_ALL_CORE
For information on Fall & Injury Prevention, visit: [email or web address] OR  [email or web address] OR  https://www.cdc.gov/steadi/patient.html

## 2023-10-25 NOTE — DISCHARGE NOTE PROVIDER - CARE PROVIDERS DIRECT ADDRESSES
[email or web address]

## 2023-10-25 NOTE — DISCHARGE NOTE PROVIDER - NSDCFUSCHEDAPPT_GEN_ALL_CORE_FT
Edwin Lockhart  [facility] PreAdmits  Scheduled Appointment: 12/19/2023    [facility] Physician [facility]  NEUROLOGY  Alexys   Scheduled Appointment: 12/19/2023

## 2023-10-25 NOTE — PROGRESS NOTE ADULT - SUBJECTIVE AND OBJECTIVE BOX
Epilepsy Attending Note:     SABRINA CASTRO    58y Female  MRN [id number]    Vital Signs Last 24 Hrs  T(C): 35.8 (24 Oct 2023 05:07), Max: 36.3 (23 Oct 2023 14:46)  T(F): 96.4 (24 Oct 2023 05:07), Max: 97.3 (23 Oct 2023 14:46)  HR: 90 (24 Oct 2023 05:07) (90 - 110)  BP: 104/54 (24 Oct 2023 05:07) (104/54 - 138/63)  BP(mean): --  RR: 18 (24 Oct 2023 05:07) (18 - 18)  SpO2: 94% (24 Oct 2023 05:07) (94% - 95%)    Parameters below as of 24 Oct 2023 05:07  Patient On (Oxygen Delivery Method): room air                              12.5   7.14  )-----------( 217      ( 23 Oct 2023 19:04 )             37.8       10-23    141  |  103  |  22<H>  ----------------------------<  126<H>  4.5   |  26  |  0.7    Ca    10.0      23 Oct 2023 19:04  Mg     1.7     10-    TPro  6.9  /  Alb  4.4  /  TBili  <0.2  /  DBili  x   /  AST  21  /  ALT  18  /  AlkPhos  87  10-23      MEDICATIONS  (STANDING):  atorvastatin 10 milliGRAM(s) Oral at bedtime  divalproex  milliGRAM(s) Oral <User Schedule>  enoxaparin Injectable 40 milliGRAM(s) SubCutaneous every 24 hours  influenza   Vaccine 0.5 milliLiter(s) IntraMuscular once  levETIRAcetam 1500 milliGRAM(s) Oral every 12 hours  levOCARNitine 330 milliGRAM(s) Oral two times a day with meals  magnesium oxide 400 milliGRAM(s) Oral three times a day with meals  OXcarbazepine 300 milliGRAM(s) Oral <User Schedule>    MEDICATIONS  (PRN):  acetaminophen     Tablet .. 650 milliGRAM(s) Oral every 6 hours PRN Temp greater or equal to 38C (100.4F), Mild Pain (1 - 3)  albuterol    90 MICROgram(s) HFA Inhaler 2 Puff(s) Inhalation every 6 hours PRN Bronchospasm  albuterol/ipratropium for Nebulization 3 milliLiter(s) Nebulizer every 6 hours PRN Bronchospasm  bismuth subsalicylate Liquid 10 milliLiter(s) Oral two times a day PRN Diarrhea/ indisgestion  LORazepam   Injectable 2 milliGRAM(s) IV Push three times a day PRN generalized tonic-clonic seizure lasting longer than 2 minutes, or two consecutive seizures without return to baseline in-between  ondansetron Injectable 4 milliGRAM(s) IV Push every 8 hours PRN Nausea and/or Vomiting      Valproic Acid Level, Serum: 78.0 ug/mL [50.0 - 100.0] (10-23-23 @ 19:04)        VEEG in the last 24 hours:    Background - continuous, asymmetrical with higher amplitude and breach artifact from the right hemisphere, reaching frequencies in the range of 8-9 Hz superimposed by lower range theta    Focal and generalized slowin. borderline to mild generalized slowing  2. right hemispheric focal slowing, manly central and temporal region, temporal and posterior quadrants    Interictal activity - small number of right temporo-parietal sharp transients    Events - none    Seizures - none    Impression: Abnormal VEEG as above    Plan -   Will continue monitoring  Seizure precautions  No change in medications for now  
Hospital Day:  1d    Subjective:    Patient is a 58y old  Female who presents with a chief complaint of VEEG. No overnight events. In no distress this am.      Admitted to medicine for a primary diagnosis of elective veeg     Past Medical Hx:   Arteriovenous malformation (AVM)    Seizures    Asthma    History of radiation therapy    Anxiety      Past Sx:  H/O: hysterectomy      Allergies:  No Known Allergies    Current Meds:   Standng Meds:  atorvastatin 10 milliGRAM(s) Oral at bedtime  divalproex  milliGRAM(s) Oral <User Schedule>  enoxaparin Injectable 40 milliGRAM(s) SubCutaneous every 24 hours  influenza   Vaccine 0.5 milliLiter(s) IntraMuscular once  levETIRAcetam 1500 milliGRAM(s) Oral every 12 hours  levOCARNitine 330 milliGRAM(s) Oral two times a day with meals  magnesium oxide 400 milliGRAM(s) Oral three times a day with meals  magnesium sulfate  IVPB 2 Gram(s) IV Intermittent every 2 hours  OXcarbazepine 300 milliGRAM(s) Oral <User Schedule>    PRN Meds:  acetaminophen     Tablet .. 650 milliGRAM(s) Oral every 6 hours PRN Temp greater or equal to 38C (100.4F), Mild Pain (1 - 3)  albuterol    90 MICROgram(s) HFA Inhaler 2 Puff(s) Inhalation every 6 hours PRN Bronchospasm  albuterol/ipratropium for Nebulization 3 milliLiter(s) Nebulizer every 6 hours PRN Bronchospasm  bismuth subsalicylate Liquid 10 milliLiter(s) Oral two times a day PRN Diarrhea/ indisgestion  LORazepam   Injectable 2 milliGRAM(s) IV Push three times a day PRN generalized tonic-clonic seizure lasting longer than 2 minutes, or two consecutive seizures without return to baseline in-between  ondansetron Injectable 4 milliGRAM(s) IV Push every 8 hours PRN Nausea and/or Vomiting    HOME MEDICATIONS:  ALBUTEROL HFA 90 MCG INHALER: INHALE 1 TO 2 PUFFS BY MOUTH EVERY 4 TO 6 HOURS AS NEEDED  atorvastatin 10 mg oral tablet: 1 tab(s) orally once a day  DIVALPROEX SOD  MG TAB: TAKE 1 TABLET BY MOUTH EVERY 12 HOURS WITH FOOD  LEVETIRACETAM 750 MG TABLET: TAKE 2 TABLETS BY MOUTH EVERY 12 HOURS  metFORMIN 500 mg oral tablet: 1 tab(s) orally once a day  OXCARBAZEPINE 300 MG TABLET: TAKE 1 TABLET BY MOUTH THREE TIMES A DAY  VALPROIC ACID 250 MG CAPSULE: PLEASE TAKE 1 CAPSULE BY MOUTH BEFORE BEDTIME      Vital Signs:   T(F): 97.2 (10-24-23 @ 13:46), Max: 97.2 (10-24-23 @ 13:46)  HR: 99 (10-24-23 @ 13:46) (90 - 110)  BP: 140/53 (10-24-23 @ 13:46) (104/54 - 140/53)  RR: 16 (10-24-23 @ 13:46) (16 - 18)  SpO2: 94% (10-24-23 @ 05:07) (94% - 95%)        Physical Exam:   GENERAL: NAD  HEENT: NCAT  CHEST/LUNG: CTAB  HEART: Regular rate and rhythm; s1 s2 appreciated, No murmurs, rubs, or gallops  ABDOMEN: Soft, Nontender, Nondistended; Bowel sounds present  EXTREMITIES: No LE edema b/l  SKIN: no rashes, no new lesions  NERVOUS SYSTEM:  Alert & Oriented X3          Labs:                         12.5   7.14  )-----------( 217      ( 23 Oct 2023 19:04 )             37.8     Neutophil% 50.2, Lymphocyte% 34.2, Monocyte% 14.6, Bands% 0.1 10-23-23 @ 19:04    23 Oct 2023 19:04    141    |  103    |  22     ----------------------------<  126    4.5     |  26     |  0.7      Ca    10.0       23 Oct 2023 19:04  Mg     1.7       23 Oct 2023 19:04    TPro  6.9    /  Alb  4.4    /  TBili  <0.2   /  DBili  x      /  AST  21     /  ALT  18     /  AlkPhos  87     23 Oct 2023 19:04                    Urinalysis Basic - ( 23 Oct 2023 19:04 )    Color: x / Appearance: x / SG: x / pH: x  Gluc: 126 mg/dL / Ketone: x  / Bili: x / Urobili: x   Blood: x / Protein: x / Nitrite: x   Leuk Esterase: x / RBC: x / WBC x   Sq Epi: x / Non Sq Epi: x / Bacteria: x            
Epilepsy Attending Note:     SABRINA CASTRO    58y Female  MRN [id number]    Vital Signs Last 24 Hrs  T(C): 35.9 (25 Oct 2023 05:09), Max: 36.2 (24 Oct 2023 13:46)  T(F): 96.7 (25 Oct 2023 05:09), Max: 97.2 (24 Oct 2023 13:46)  HR: 81 (25 Oct 2023 05:09) (81 - 99)  BP: 114/59 (25 Oct 2023 05:09) (114/59 - 140/53)  BP(mean): --  RR: 18 (25 Oct 2023 05:09) (16 - 18)  SpO2: 95% (24 Oct 2023 20:30) (95% - 95%)    Parameters below as of 24 Oct 2023 20:30  Patient On (Oxygen Delivery Method): room air                              12.5   7.14  )-----------( 217      ( 23 Oct 2023 19:04 )             37.8       10    141  |  103  |  22<H>  ----------------------------<  126<H>  4.5   |  26  |  0.7    Ca    10.0      23 Oct 2023 19:04  Mg     1.7     10    TPro  6.9  /  Alb  4.4  /  TBili  <0.2  /  DBili  x   /  AST  21  /  ALT  18  /  AlkPhos  87  10-23      MEDICATIONS  (STANDING):  atorvastatin 10 milliGRAM(s) Oral at bedtime  divalproex  milliGRAM(s) Oral <User Schedule>  enoxaparin Injectable 40 milliGRAM(s) SubCutaneous every 24 hours  influenza   Vaccine 0.5 milliLiter(s) IntraMuscular once  levETIRAcetam 1500 milliGRAM(s) Oral every 12 hours  levOCARNitine 330 milliGRAM(s) Oral two times a day with meals  OXcarbazepine 300 milliGRAM(s) Oral <User Schedule>    MEDICATIONS  (PRN):  acetaminophen     Tablet .. 650 milliGRAM(s) Oral every 6 hours PRN Temp greater or equal to 38C (100.4F), Mild Pain (1 - 3)  albuterol    90 MICROgram(s) HFA Inhaler 2 Puff(s) Inhalation every 6 hours PRN Bronchospasm  albuterol/ipratropium for Nebulization 3 milliLiter(s) Nebulizer every 6 hours PRN Bronchospasm  bismuth subsalicylate Liquid 10 milliLiter(s) Oral two times a day PRN Diarrhea/ indisgestion  LORazepam   Injectable 2 milliGRAM(s) IV Push three times a day PRN generalized tonic-clonic seizure lasting longer than 2 minutes, or two consecutive seizures without return to baseline in-between  ondansetron Injectable 4 milliGRAM(s) IV Push every 8 hours PRN Nausea and/or Vomiting      Valproic Acid Level, Serum: 78.0 ug/mL [50.0 - 100.0] (10-23-23 @ 19:04)        VEEG in the last 24 hours:    Background - continuous, asymmetrical with higher amplitude and breach artifact from the right hemisphere, reaching frequencies in the range of 8-9 Hz superimposed by lower range theta    Focal and generalized slowin. borderline to mild generalized slowing  2. right hemispheric focal slowing, manly central and temporal region, temporal and posterior quadrants    Interictal activity - small number of right temporo-parietal sharp transients and rare sharp waves that appear epileptiform    Events - none    Seizures - none    Impression: Abnormal VEEG as above. Levels pending.    Plan -   Will discontinue monitoring  Continue current doses of Keppra and Depakote  Change Trileptal to 450mg q12hrs to improve compliance  Follow up as scheduled at [facility] neuro clinic   
Epilepsy NP Note:    VEEG monitoring completed, patient is clear for discharge from neurology standpoint.    Follow up at [facility] as previously scheduled December 19, 2023 at 1 pm.    [address] [place] [address]  [phone]    Discharge seizure medications:  Keppra 1500mg q12hrs (pre-admission dose)  Depakote 500mg q12hrs (pre-admission dose)  Trileptal 450mg q12hrs (same total daily dose, changed to twice daily to improve compliance)    Rx sent to the pharmacy.    Continue Carnitor 330mg twice daily with food, Rx sent.    Patient was also given Rx for CBC, CMP, ASM levels trough to be done prior to follow up.    Detailed written and verbal instructions regarding seizure precautions and follow up plan are given to the patient and significant other, all questions answered. The importance of taking medications regularly reinforced. Patient verbalized understanding and agreement.    Discussed with medical and nursing teams..

## 2023-10-25 NOTE — DISCHARGE NOTE PROVIDER - HOSPITAL COURSE
57F w/ PMH of epilepsy, AVM (), benign brain tumor s/p resection (), (seizure semiology is L UE and LE shaking with no loss in consciousness lasting about 5 minutes), anemia, MELITA, bipolar disorder who presents for elective vEEG.   Pt was admitted to medicine and monitored on VEEG.  VEEG:  Background - continuous, asymmetrical with higher amplitude and breach artifact from the right hemisphere, reaching frequencies in the range of 8-9 Hz superimposed by lower range theta  Focal and generalized slowin. borderline to mild generalized slowing  2. right hemispheric focal slowing, manly central and temporal region, temporal and posterior quadrants  Interictal activity - small number of right temporo-parietal sharp transients and rare sharp waves that appear epileptiform  Events - none  Seizures - none  Impression: Abnormal VEEG as above. Levels pending.    Today VEEG monitoring completed, patient is clear for discharge from neurology standpoint.  Discharge seizure medications:  Keppra 1500mg q12hrs (pre-admission dose)  Depakote 500mg q12hrs (pre-admission dose)  Trileptal 450mg q12hrs (same total daily dose, changed to twice daily to improve compliance)  Rx sent to the pharmacy.  Continue Carnitor 330mg twice daily with food, Rx sent.  Pt will follow up at [facility] as previously scheduled 2023 at 1 pm.  Patient was also given Rx for CBC, CMP, ASM levels trough to be done prior to follow up.  Detailed written and verbal instructions regarding seizure precautions and follow up plan are given to the patient and significant other by neurology team. 57F w/ PMH of epilepsy, AVM (), benign brain tumor s/p resection (), (seizure semiology is L UE and LE shaking with no loss in consciousness lasting about 5 minutes), anemia, MELITA, bipolar disorder who presents for elective vEEG.   Pt was admitted to medicine and monitored on VEEG.  VEEG:  Background - continuous, asymmetrical with higher amplitude and breach artifact from the right hemisphere, reaching frequencies in the range of 8-9 Hz superimposed by lower range theta  Focal and generalized slowin. borderline to mild generalized slowing  2. right hemispheric focal slowing, manly central and temporal region, temporal and posterior quadrants  Interictal activity - small number of right temporo-parietal sharp transients and rare sharp waves that appear epileptiform  Events - none  Seizures - none  Impression: Abnormal VEEG as above. Levels pending.    Today VEEG monitoring completed, patient is clear for discharge from neurology standpoint.  Discharge seizure medications:  Keppra 1500mg q12hrs (pre-admission dose)  Depakote 500mg q12hrs (pre-admission dose)  Trileptal 450mg q12hrs (same total daily dose, changed to twice daily to improve compliance)  Rx sent to the pharmacy.  Continue Carnitor 330mg twice daily with food, Rx sent.  Pt will follow up at [facility] as previously scheduled 2023 at 1 pm.  Patient was also given Rx for CBC, CMP, ASM levels trough to be done prior to follow up.  Detailed written and verbal instructions regarding seizure precautions and follow up plan are given to the patient and significant other by neurology team.      # h/o DM  on metformin at home  monitor with regular finger sticks.

## 2023-10-26 LAB
LEVETIRACETAM SERPL-MCNC: 22.4 UG/ML — SIGNIFICANT CHANGE UP (ref 10–40)
LEVETIRACETAM SERPL-MCNC: 22.4 UG/ML — SIGNIFICANT CHANGE UP (ref 10–40)

## 2023-10-27 LAB
OXCARBAZEPINE SERPL-MCNC: 23 UG/ML — SIGNIFICANT CHANGE UP (ref 10–35)
OXCARBAZEPINE SERPL-MCNC: 23 UG/ML — SIGNIFICANT CHANGE UP (ref 10–35)

## 2023-11-08 DIAGNOSIS — G40.909 EPILEPSY, UNSPECIFIED, NOT INTRACTABLE, WITHOUT STATUS EPILEPTICUS: ICD-10-CM

## 2023-11-08 DIAGNOSIS — D64.9 ANEMIA, UNSPECIFIED: ICD-10-CM

## 2023-11-08 DIAGNOSIS — Z79.84 LONG TERM (CURRENT) USE OF ORAL HYPOGLYCEMIC DRUGS: ICD-10-CM

## 2023-11-08 DIAGNOSIS — E11.9 TYPE 2 DIABETES MELLITUS WITHOUT COMPLICATIONS: ICD-10-CM

## 2023-11-08 DIAGNOSIS — J45.909 UNSPECIFIED ASTHMA, UNCOMPLICATED: ICD-10-CM

## 2023-11-08 DIAGNOSIS — E83.42 HYPOMAGNESEMIA: ICD-10-CM

## 2023-11-08 DIAGNOSIS — E78.5 HYPERLIPIDEMIA, UNSPECIFIED: ICD-10-CM

## 2023-11-08 DIAGNOSIS — R94.01 ABNORMAL ELECTROENCEPHALOGRAM [EEG]: ICD-10-CM

## 2023-11-08 DIAGNOSIS — Z79.899 OTHER LONG TERM (CURRENT) DRUG THERAPY: ICD-10-CM

## 2023-11-08 DIAGNOSIS — Z86.011 PERSONAL HISTORY OF BENIGN NEOPLASM OF THE BRAIN: ICD-10-CM

## 2023-11-08 DIAGNOSIS — F31.9 BIPOLAR DISORDER, UNSPECIFIED: ICD-10-CM

## 2023-12-04 LAB
ALBUMIN SERPL ELPH-MCNC: 4.3 G/DL
ALP BLD-CCNC: 101 U/L
ALT SERPL-CCNC: 14 U/L
ANION GAP SERPL CALC-SCNC: 13 MMOL/L
AST SERPL-CCNC: 9 U/L
BILIRUB SERPL-MCNC: <0.2 MG/DL
BUN SERPL-MCNC: 23 MG/DL
CALCIUM SERPL-MCNC: 9.8 MG/DL
CHLORIDE SERPL-SCNC: 101 MMOL/L
CO2 SERPL-SCNC: 26 MMOL/L
CREAT SERPL-MCNC: 0.6 MG/DL
EGFR: 105 ML/MIN/1.73M2
GLUCOSE SERPL-MCNC: 116 MG/DL
HCT VFR BLD CALC: 38.3 %
HGB BLD-MCNC: 12.4 G/DL
MAGNESIUM SERPL-MCNC: 1.9 MG/DL
MCHC RBC-ENTMCNC: 31.6 PG
MCHC RBC-ENTMCNC: 32.4 G/DL
MCV RBC AUTO: 97.5 FL
PLATELET # BLD AUTO: 232 K/UL
PMV BLD: 10.8 FL
POTASSIUM SERPL-SCNC: 4.6 MMOL/L
PROT SERPL-MCNC: 7 G/DL
RBC # BLD: 3.93 M/UL
RBC # FLD: 13.8 %
SODIUM SERPL-SCNC: 140 MMOL/L
VALPROATE SERPL-MCNC: 75 UG/ML
WBC # FLD AUTO: 6.67 K/UL

## 2023-12-08 LAB
LEVETIRACETAM SERPL-MCNC: 21.6 UG/ML
OXCARBAZEPINE SERPL-MCNC: 16 UG/ML

## 2023-12-19 ENCOUNTER — OUTPATIENT (OUTPATIENT)
Dept: OUTPATIENT SERVICES | Facility: HOSPITAL | Age: 58
LOS: 1 days | End: 2023-12-19
Payer: MEDICAID

## 2023-12-19 ENCOUNTER — APPOINTMENT (OUTPATIENT)
Dept: NEUROLOGY | Facility: CLINIC | Age: 58
End: 2023-12-19
Payer: MEDICAID

## 2023-12-19 VITALS
OXYGEN SATURATION: 97 % | SYSTOLIC BLOOD PRESSURE: 104 MMHG | DIASTOLIC BLOOD PRESSURE: 70 MMHG | WEIGHT: 150 LBS | HEART RATE: 90 BPM | TEMPERATURE: 96.4 F | BODY MASS INDEX: 28.32 KG/M2 | HEIGHT: 61 IN

## 2023-12-19 DIAGNOSIS — G40.919 EPILEPSY, UNSPECIFIED, INTRACTABLE, W/OUT STATUS EPILEPTICUS: ICD-10-CM

## 2023-12-19 DIAGNOSIS — Z00.00 ENCOUNTER FOR GENERAL ADULT MEDICAL EXAMINATION WITHOUT ABNORMAL FINDINGS: ICD-10-CM

## 2023-12-19 DIAGNOSIS — Z90.710 ACQUIRED ABSENCE OF BOTH CERVIX AND UTERUS: Chronic | ICD-10-CM

## 2023-12-19 PROCEDURE — 99214 OFFICE O/P EST MOD 30 MIN: CPT

## 2023-12-20 ENCOUNTER — TRANSCRIPTION ENCOUNTER (OUTPATIENT)
Age: 58
End: 2023-12-20

## 2023-12-20 NOTE — PHYSICAL EXAM
[General Appearance - Alert] : alert [General Appearance - In No Acute Distress] : in no acute distress [Oriented To Time, Place, And Person] : oriented to person, place, and time [Impaired Insight] : insight and judgment were intact [Affect] : the affect was normal [Person] : oriented to person [Place] : oriented to place [Time] : oriented to time [Concentration Intact] : normal concentrating ability [Visual Intact] : visual attention was ~T not ~L decreased [Naming Objects] : no difficulty naming common objects [Repeating Phrases] : no difficulty repeating a phrase [Writing A Sentence] : no difficulty writing a sentence [Fluency] : fluency intact [Comprehension] : comprehension intact [Reading] : reading intact [Past History] : adequate knowledge of personal past history [Cranial Nerves Optic (II)] : visual acuity intact bilaterally,  visual fields full to confrontation, pupils equal round and reactive to light [Cranial Nerves Oculomotor (III)] : extraocular motion intact [Cranial Nerves Trigeminal (V)] : facial sensation intact symmetrically [Cranial Nerves Facial (VII)] : face symmetrical [Cranial Nerves Vestibulocochlear (VIII)] : hearing was intact bilaterally [Cranial Nerves Glossopharyngeal (IX)] : tongue and palate midline [Cranial Nerves Accessory (XI - Cranial And Spinal)] : head turning and shoulder shrug symmetric [Cranial Nerves Hypoglossal (XII)] : there was no tongue deviation with protrusion [Motor Tone] : muscle tone was normal in all four extremities [Motor Strength] : muscle strength was normal in all four extremities [No Muscle Atrophy] : normal bulk in all four extremities [Sensation Tactile Decrease] : light touch was intact [Abnormal Walk] : normal gait [Balance] : balance was intact [3+] : Ankle jerk left 3+ [Past-pointing] : there was no past-pointing [Tremor] : no tremor present [___] : absent on the right [___] : absent on the left

## 2023-12-20 NOTE — HISTORY OF PRESENT ILLNESS
[FreeTextEntry1] : 57F w/ PMH of epilepsy, AVM (), benign brain tumor s/p resection (), (seizure semiology is L UE and LE shaking with no loss in consciousness lasting about 5 minutes), anemia, MELITA, bipolar disorder who presents for routine follow up for her seizure disorder.   She reports that the oxcarbazepine extended release was not covered by insurance, so she continued her original oxcarbazepine dose 300 mg TID. She was admitted on 2023 for vEEG and was discharged on 2023. At that time her oxcarbazepine was changed to 450 mg BID. She reports that the day after discharge she had 2 seizures at home with L UE and L LE shaking, no loss of consciousness that occurred 7 minutes apart, the first one lasted 4 minutes and the second one lasted 3 min. She also had levels checked for her seizure medications and all medications were therapeutic.   VEEG from : showed  Background - continuous, asymmetrical with higher amplitude and breach artifact from the right hemisphere,  reaching frequencies in the range of 8-9 Hz superimposed by lower range theta Focal and generalized slowin. borderline to mild generalized slowing 2. right hemispheric focal slowing, manly central and temporal region, temporal and posterior quadrants Interictal activity - small number of right temporo-parietal sharp transients and rare sharp waves that appear epileptiform Events - none Seizures - none  Social: no cigarette smoking no alcohol use Allergies: NKDA

## 2023-12-20 NOTE — ASSESSMENT
[FreeTextEntry1] : 58F w/ PMH of epilepsy, AVM (1986), benign brain tumor s/p resection (2005), (seizure semiology is L UE and LE shaking with no loss in consciousness lasting about 5 minutes), anemia, MELITA, bipolar disorder who presents for routine follow up for her seizure disorder. EMU admission 10/25/2023 Interictal activity - small number of right temporo-parietal sharp transients and rare sharp waves that appear epileptiform  Plan - increase oxcarbazepine 600 mg BID - Continue keppra 1500mg BID, depakote 500 mg BID, levocarnitine 330mg BID - prescribe midazolam spray to abort seizures prn - RTC in 3 months

## 2023-12-20 NOTE — REVIEW OF SYSTEMS
[Seizures] : convulsions [Negative] : Musculoskeletal [Confused or Disoriented] : no confusion [Memory Lapses or Loss] : no memory loss [Decr. Concentrating Ability] : no decrease in concentrating ability [Difficulty with Language] : no ~M difficulty with language [Facial Weakness] : no facial weakness [Arm Weakness] : no arm weakness [Hand Weakness] : no hand weakness [Leg Weakness] : no leg weakness [Poor Coordination] : good coordination [Difficulty Writing] : no difficulty writing [Difficulties in Speech] : no speech difficulties [Numbness] : no numbness [Tingling] : no tingling [Abnormal Sensation] : no abnormal sensation [Hypersensitivity] : no hypersensitivity [Dizziness] : no dizziness [Fainting] : no fainting [Lightheadedness] : no lightheadedness [Vertigo] : no vertigo [Cluster Headache] : no cluster headache [Migraine Headache] : no migraine headache [Tension Headache] : no tension-type headache [Inability to Walk] : able to walk [Ataxia] : no ataxia [Frequent Falls] : not falling

## 2023-12-20 NOTE — END OF VISIT
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 Midazolam spran prn focal seizures- informed of side effects  [Time Spent: ___ minutes] : I have spent [unfilled] minutes of time on the encounter.

## 2023-12-26 ENCOUNTER — TRANSCRIPTION ENCOUNTER (OUTPATIENT)
Age: 58
End: 2023-12-26

## 2023-12-27 DIAGNOSIS — G40.919 EPILEPSY, UNSPECIFIED, INTRACTABLE, WITHOUT STATUS EPILEPTICUS: ICD-10-CM

## 2024-01-01 NOTE — ED PROVIDER NOTE - ATTENDING APP SHARED VISIT CONTRIBUTION OF CARE
57yF p/w mechanical fall at her 's office today - no LOC and not on a/c, but did hit her head, so dr sent her in for head CT.  Pt ambulates w/ cane at baseline and is often unsteady.  Gait assessed and currently at her baseline as per pt and  witnessing at bedside.
hard copy, drawn during this pregnancy

## 2024-01-04 ENCOUNTER — TRANSCRIPTION ENCOUNTER (OUTPATIENT)
Age: 59
End: 2024-01-04

## 2024-01-04 RX ORDER — ALBUTEROL SULFATE 90 UG/1
108 (90 BASE) AEROSOL, METERED RESPIRATORY (INHALATION)
Qty: 30 | Refills: 0 | Status: ACTIVE | COMMUNITY
Start: 2023-09-11 | End: 1900-01-01

## 2024-01-05 ENCOUNTER — TRANSCRIPTION ENCOUNTER (OUTPATIENT)
Age: 59
End: 2024-01-05

## 2024-01-19 NOTE — REASON FOR VISIT
[Patient seeking care elsewhere] : Patient seeking care elsewhere [FreeTextEntry9] : 6/14/2022 [FreeTextEntry8] : 1/19/2024

## 2024-01-19 NOTE — DISCUSSION/SUMMARY
[FreeTextEntry1] : Yecenia is a 59yo single female, past history of anxiety, PMHx of seizure dx secondary to AVM resection in 2005. She was taking Sertraline 50mg daily for the past decade and requested to discontinue the medication. She was initiated on Hydroxyzine with positive response regarding anxiety symptoms. She denies thoughts of harm to self or others. She is adherent with Hydroxyzine 25mg am, 50mg bedtime. As her symptoms are stable, she will obtain prescription from pmd, Dr. Carroll.  Case is closed as of 1/19/2024.

## 2024-01-22 ENCOUNTER — APPOINTMENT (OUTPATIENT)
Dept: INTERNAL MEDICINE | Facility: CLINIC | Age: 59
End: 2024-01-22
Payer: MEDICAID

## 2024-01-22 ENCOUNTER — OUTPATIENT (OUTPATIENT)
Dept: OUTPATIENT SERVICES | Facility: HOSPITAL | Age: 59
LOS: 1 days | End: 2024-01-22
Payer: MEDICAID

## 2024-01-22 VITALS
TEMPERATURE: 97 F | WEIGHT: 148 LBS | BODY MASS INDEX: 27.94 KG/M2 | SYSTOLIC BLOOD PRESSURE: 105 MMHG | DIASTOLIC BLOOD PRESSURE: 63 MMHG | OXYGEN SATURATION: 98 % | HEIGHT: 61 IN | HEART RATE: 70 BPM

## 2024-01-22 DIAGNOSIS — Z00.00 ENCOUNTER FOR GENERAL ADULT MEDICAL EXAMINATION WITHOUT ABNORMAL FINDINGS: ICD-10-CM

## 2024-01-22 DIAGNOSIS — R26.89 OTHER ABNORMALITIES OF GAIT AND MOBILITY: ICD-10-CM

## 2024-01-22 DIAGNOSIS — Z90.710 ACQUIRED ABSENCE OF BOTH CERVIX AND UTERUS: Chronic | ICD-10-CM

## 2024-01-22 PROCEDURE — 99213 OFFICE O/P EST LOW 20 MIN: CPT

## 2024-01-22 NOTE — PHYSICAL EXAM
[No Acute Distress] : no acute distress [Well Nourished] : well nourished [Well Developed] : well developed [Normal Sclera/Conjunctiva] : normal sclera/conjunctiva [EOMI] : extraocular movements intact [Normal Outer Ear/Nose] : the outer ears and nose were normal in appearance [Normal Oropharynx] : the oropharynx was normal [No JVD] : no jugular venous distention [Supple] : supple [No Respiratory Distress] : no respiratory distress  [No Accessory Muscle Use] : no accessory muscle use [Clear to Auscultation] : lungs were clear to auscultation bilaterally [Regular Rhythm] : with a regular rhythm [No Edema] : there was no peripheral edema [No Varicosities] : no varicosities [Soft] : abdomen soft [Non Tender] : non-tender [Normal Bowel Sounds] : normal bowel sounds [No Joint Swelling] : no joint swelling [Grossly Normal Strength/Tone] : grossly normal strength/tone [No Rash] : no rash [No Skin Lesions] : no skin lesions [Coordination Grossly Intact] : coordination grossly intact [No Focal Deficits] : no focal deficits [Normal Gait] : normal gait

## 2024-01-22 NOTE — REVIEW OF SYSTEMS
[Fever] : no fever [Chills] : no chills [Discharge] : no discharge [Itching] : no itching [Sore Throat] : no sore throat [Nasal Discharge] : no nasal discharge [Chest Pain] : no chest pain [Palpitations] : no palpitations [Shortness Of Breath] : no shortness of breath [Cough] : no cough [Abdominal Pain] : no abdominal pain [Nausea] : no nausea [Constipation] : no constipation [Skin Rash] : no skin rash [Headache] : no headache [Dizziness] : no dizziness

## 2024-01-22 NOTE — HISTORY OF PRESENT ILLNESS
[FreeTextEntry1] : follow up  [de-identified] : 59 y/o F w/ pmhx known to have Seizure 2/2 AVM since 1986, asthma, prediabetes, EVERETTE off iron, Urinary incontinence s/p pessary by Uro gyn. Here for referral for physical therapy. Pateint also saying she needs more Nayzilam as she's having an increase in seizure.

## 2024-01-22 NOTE — REVIEW OF SYSTEMS
[Fever] : no fever [Chills] : no chills [Discharge] : no discharge [Itching] : no itching [Sore Throat] : no sore throat [Nasal Discharge] : no nasal discharge [Chest Pain] : no chest pain [Palpitations] : no palpitations [Cough] : no cough [Shortness Of Breath] : no shortness of breath [Abdominal Pain] : no abdominal pain [Nausea] : no nausea [Constipation] : no constipation [Skin Rash] : no skin rash [Headache] : no headache [Dizziness] : no dizziness

## 2024-01-22 NOTE — ASSESSMENT
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 Here for referral for physical therapy.  #Epilepsy #Break through seizure #Weakness - Goes to physical therapy 2x a week - Referral sent  - Will speak to neurology to see if patient can get an appt sooner, if not has an appointment in early Apiril  - Patient compliant with medications (Keppra and Depakote) - Will order CMP and Mag for next visit  # HCM - Mammogram 4/12/23 BIRADS 1 - Notes being due for pap smear, has not had it this year but follows w/GYN - Colonoscopy: Normal results in 2018 per the patient, due for colonoscopy in 2024 but is not interested, will follow up on FIT test (occult blood) - follow up in 6 months with repeat lab work and prn.

## 2024-01-22 NOTE — HISTORY OF PRESENT ILLNESS
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 Here for referral for physical therapy. Pateint also saying she needs more Nayzilam as she's having an increase in seizure.  [FreeTextEntry1] : follow up

## 2024-01-23 ENCOUNTER — RX RENEWAL (OUTPATIENT)
Age: 59
End: 2024-01-23

## 2024-01-24 ENCOUNTER — TRANSCRIPTION ENCOUNTER (OUTPATIENT)
Age: 59
End: 2024-01-24

## 2024-01-25 DIAGNOSIS — E11.9 TYPE 2 DIABETES MELLITUS WITHOUT COMPLICATIONS: ICD-10-CM

## 2024-01-25 DIAGNOSIS — G40.909 EPILEPSY, UNSPECIFIED, NOT INTRACTABLE, WITHOUT STATUS EPILEPTICUS: ICD-10-CM

## 2024-01-25 DIAGNOSIS — E78.5 HYPERLIPIDEMIA, UNSPECIFIED: ICD-10-CM

## 2024-01-25 DIAGNOSIS — R26.89 OTHER ABNORMALITIES OF GAIT AND MOBILITY: ICD-10-CM

## 2024-02-27 ENCOUNTER — TRANSCRIPTION ENCOUNTER (OUTPATIENT)
Age: 59
End: 2024-02-27

## 2024-03-12 ENCOUNTER — APPOINTMENT (OUTPATIENT)
Dept: INTERNAL MEDICINE | Facility: CLINIC | Age: 59
End: 2024-03-12
Payer: MEDICAID

## 2024-03-12 ENCOUNTER — NON-APPOINTMENT (OUTPATIENT)
Age: 59
End: 2024-03-12

## 2024-03-12 ENCOUNTER — OUTPATIENT (OUTPATIENT)
Dept: OUTPATIENT SERVICES | Facility: HOSPITAL | Age: 59
LOS: 1 days | End: 2024-03-12
Payer: MEDICAID

## 2024-03-12 VITALS
HEART RATE: 86 BPM | DIASTOLIC BLOOD PRESSURE: 68 MMHG | HEIGHT: 61 IN | SYSTOLIC BLOOD PRESSURE: 109 MMHG | WEIGHT: 140 LBS | TEMPERATURE: 97.9 F | OXYGEN SATURATION: 97 % | BODY MASS INDEX: 26.43 KG/M2

## 2024-03-12 DIAGNOSIS — J45.909 UNSPECIFIED ASTHMA, UNCOMPLICATED: ICD-10-CM

## 2024-03-12 DIAGNOSIS — E11.9 TYPE 2 DIABETES MELLITUS W/OUT COMPLICATIONS: ICD-10-CM

## 2024-03-12 DIAGNOSIS — E78.5 HYPERLIPIDEMIA, UNSPECIFIED: ICD-10-CM

## 2024-03-12 DIAGNOSIS — Z90.710 ACQUIRED ABSENCE OF BOTH CERVIX AND UTERUS: Chronic | ICD-10-CM

## 2024-03-12 DIAGNOSIS — Z00.00 ENCOUNTER FOR GENERAL ADULT MEDICAL EXAMINATION WITHOUT ABNORMAL FINDINGS: ICD-10-CM

## 2024-03-12 DIAGNOSIS — R73.03 PREDIABETES.: ICD-10-CM

## 2024-03-12 DIAGNOSIS — Z23 ENCOUNTER FOR IMMUNIZATION: ICD-10-CM

## 2024-03-12 PROCEDURE — 90686 IIV4 VACC NO PRSV 0.5 ML IM: CPT

## 2024-03-12 PROCEDURE — 99214 OFFICE O/P EST MOD 30 MIN: CPT

## 2024-03-12 PROCEDURE — 90471 IMMUNIZATION ADMIN: CPT | Mod: 25

## 2024-03-12 RX ORDER — BUDESONIDE AND FORMOTEROL FUMARATE DIHYDRATE 160; 4.5 UG/1; UG/1
160-4.5 AEROSOL RESPIRATORY (INHALATION) TWICE DAILY
Qty: 3 | Refills: 1 | Status: ACTIVE | COMMUNITY
Start: 2019-11-20 | End: 1900-01-01

## 2024-03-12 NOTE — PHYSICAL EXAM
[No Acute Distress] : no acute distress [Well Nourished] : well nourished [Well Developed] : well developed [Normal Sclera/Conjunctiva] : normal sclera/conjunctiva [EOMI] : extraocular movements intact [Normal Outer Ear/Nose] : the outer ears and nose were normal in appearance [Normal Oropharynx] : the oropharynx was normal [No JVD] : no jugular venous distention [Supple] : supple [No Respiratory Distress] : no respiratory distress  [No Accessory Muscle Use] : no accessory muscle use [Clear to Auscultation] : lungs were clear to auscultation bilaterally [Regular Rhythm] : with a regular rhythm [No Edema] : there was no peripheral edema [No Varicosities] : no varicosities [Non Tender] : non-tender [Soft] : abdomen soft [Normal Bowel Sounds] : normal bowel sounds [No Joint Swelling] : no joint swelling [Grossly Normal Strength/Tone] : grossly normal strength/tone [No Rash] : no rash [No Skin Lesions] : no skin lesions [Coordination Grossly Intact] : coordination grossly intact [No Focal Deficits] : no focal deficits [Normal Gait] : normal gait

## 2024-03-12 NOTE — REVIEW OF SYSTEMS
[Fever] : no fever [Chills] : no chills [Discharge] : no discharge [Itching] : no itching [Sore Throat] : no sore throat [Nasal Discharge] : no nasal discharge [Palpitations] : no palpitations [Chest Pain] : no chest pain [Shortness Of Breath] : no shortness of breath [Cough] : no cough [Abdominal Pain] : no abdominal pain [Nausea] : no nausea [Constipation] : no constipation [Skin Rash] : no skin rash [Headache] : no headache [Dizziness] : no dizziness

## 2024-03-12 NOTE — HISTORY OF PRESENT ILLNESS
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 Patient would like to get her forms filled out by the . She has no acute complaints at this time. Denies any breakthrough seizures, fever, chills, n/v/d. She is compliant with her medications

## 2024-03-12 NOTE — ASSESSMENT
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   #Social work form - patient told to leave form behind to be filled out by SW - will be signed after completion #Epilepsy - Patient compliant with medications (Keppra and Depakote) - denies break through seizures; neurology f/u in april  #Asthma - continue with inhalers as needed   #Prediabetes - A1C 6.1 (09/2023) - REPEAT ORDERED  - Diet and lifestyle modifications advised   # HCM - Mammogram 4/12/23 BIRADS 1; will give referral  - Notes being due for pap smear, has not had it this year but follows w/GYN - Colonoscopy: Normal results in 2018 per the patient, due for colonoscopy in 2024 but is not interested, will follow up on FIT test (occult blood) - follow up in 6 months with repeat lab work and prn.

## 2024-03-20 DIAGNOSIS — J45.909 UNSPECIFIED ASTHMA, UNCOMPLICATED: ICD-10-CM

## 2024-03-20 DIAGNOSIS — Z23 ENCOUNTER FOR IMMUNIZATION: ICD-10-CM

## 2024-03-20 DIAGNOSIS — R73.03 PREDIABETES: ICD-10-CM

## 2024-03-20 DIAGNOSIS — E78.5 HYPERLIPIDEMIA, UNSPECIFIED: ICD-10-CM

## 2024-03-20 DIAGNOSIS — E11.9 TYPE 2 DIABETES MELLITUS WITHOUT COMPLICATIONS: ICD-10-CM

## 2024-03-20 DIAGNOSIS — G40.909 EPILEPSY, UNSPECIFIED, NOT INTRACTABLE, WITHOUT STATUS EPILEPTICUS: ICD-10-CM

## 2024-03-27 ENCOUNTER — TRANSCRIPTION ENCOUNTER (OUTPATIENT)
Age: 59
End: 2024-03-27

## 2024-04-13 ENCOUNTER — RESULT REVIEW (OUTPATIENT)
Age: 59
End: 2024-04-13

## 2024-04-13 ENCOUNTER — OUTPATIENT (OUTPATIENT)
Dept: OUTPATIENT SERVICES | Facility: HOSPITAL | Age: 59
LOS: 1 days | End: 2024-04-13
Payer: MEDICAID

## 2024-04-13 DIAGNOSIS — Z12.31 ENCOUNTER FOR SCREENING MAMMOGRAM FOR MALIGNANT NEOPLASM OF BREAST: ICD-10-CM

## 2024-04-13 DIAGNOSIS — Z90.710 ACQUIRED ABSENCE OF BOTH CERVIX AND UTERUS: Chronic | ICD-10-CM

## 2024-04-13 PROCEDURE — 77067 SCR MAMMO BI INCL CAD: CPT | Mod: 26

## 2024-04-13 PROCEDURE — 77063 BREAST TOMOSYNTHESIS BI: CPT

## 2024-04-13 PROCEDURE — 77063 BREAST TOMOSYNTHESIS BI: CPT | Mod: 26

## 2024-04-13 PROCEDURE — 77067 SCR MAMMO BI INCL CAD: CPT

## 2024-04-14 DIAGNOSIS — Z12.31 ENCOUNTER FOR SCREENING MAMMOGRAM FOR MALIGNANT NEOPLASM OF BREAST: ICD-10-CM

## 2024-04-16 ENCOUNTER — TRANSCRIPTION ENCOUNTER (OUTPATIENT)
Age: 59
End: 2024-04-16

## 2024-04-16 RX ORDER — LEVOCARNITINE 330 MG/1
330 TABLET ORAL
Qty: 60 | Refills: 3 | Status: COMPLETED | COMMUNITY
Start: 2023-09-19 | End: 2024-04-16

## 2024-04-30 ENCOUNTER — OUTPATIENT (OUTPATIENT)
Dept: OUTPATIENT SERVICES | Facility: HOSPITAL | Age: 59
LOS: 1 days | End: 2024-04-30
Payer: MEDICAID

## 2024-04-30 ENCOUNTER — APPOINTMENT (OUTPATIENT)
Dept: NEUROLOGY | Facility: CLINIC | Age: 59
End: 2024-04-30
Payer: MEDICAID

## 2024-04-30 VITALS
HEART RATE: 91 BPM | TEMPERATURE: 97 F | WEIGHT: 143 LBS | HEIGHT: 61 IN | BODY MASS INDEX: 27 KG/M2 | OXYGEN SATURATION: 96 % | SYSTOLIC BLOOD PRESSURE: 106 MMHG | DIASTOLIC BLOOD PRESSURE: 71 MMHG

## 2024-04-30 DIAGNOSIS — G40.909 EPILEPSY, UNSPECIFIED, NOT INTRACTABLE, W/OUT STATUS EPILEPTICUS: ICD-10-CM

## 2024-04-30 DIAGNOSIS — Z00.00 ENCOUNTER FOR GENERAL ADULT MEDICAL EXAMINATION WITHOUT ABNORMAL FINDINGS: ICD-10-CM

## 2024-04-30 PROCEDURE — 99214 OFFICE O/P EST MOD 30 MIN: CPT

## 2024-04-30 RX ORDER — DIVALPROEX SODIUM 500 MG/1
500 TABLET, DELAYED RELEASE ORAL
Qty: 60 | Refills: 4 | Status: ACTIVE | COMMUNITY
Start: 2023-09-19 | End: 1900-01-01

## 2024-04-30 RX ORDER — LEVOCARNITINE 330 MG/1
330 TABLET ORAL
Qty: 180 | Refills: 3 | Status: ACTIVE | COMMUNITY
Start: 2020-12-15 | End: 1900-01-01

## 2024-04-30 RX ORDER — OXCARBAZEPINE 600 MG/1
600 TABLET, FILM COATED ORAL TWICE DAILY
Qty: 60 | Refills: 4 | Status: ACTIVE | COMMUNITY
Start: 2023-12-19 | End: 1900-01-01

## 2024-04-30 RX ORDER — OXCARBAZEPINE 600 MG/1
600 TABLET ORAL
Qty: 60 | Refills: 3 | Status: DISCONTINUED | COMMUNITY
Start: 2023-09-19 | End: 2024-04-30

## 2024-04-30 RX ORDER — MIDAZOLAM 5 MG/.1ML
5 SPRAY NASAL
Qty: 2 | Refills: 3 | Status: ACTIVE | COMMUNITY
Start: 2023-12-19 | End: 1900-01-01

## 2024-04-30 RX ORDER — LEVETIRACETAM 750 MG/1
750 TABLET, FILM COATED ORAL
Qty: 120 | Refills: 4 | Status: ACTIVE | COMMUNITY
Start: 2023-09-19 | End: 1900-01-01

## 2024-04-30 NOTE — REVIEW OF SYSTEMS
[As Noted in HPI] : as noted in HPI [Arm Weakness] : arm weakness [Hand Weakness] :  hand weakness [Leg Weakness] : leg weakness [Seizures] : convulsions [Difficulty Walking] : difficulty walking [Fever] : no fever [Chills] : no chills [Confused or Disoriented] : no confusion [Memory Lapses or Loss] : no memory loss [Decr. Concentrating Ability] : no decrease in concentrating ability [Difficulty with Language] : no ~M difficulty with language [Changed Thought Patterns] : no change in thought patterns [Repeating Questions] : no repeated questioning about recent events [Poor Coordination] : good coordination [Difficulty Writing] : no difficulty writing [Difficulties in Speech] : no speech difficulties [Numbness] : no numbness [Tingling] : no tingling [Abnormal Sensation] : no abnormal sensation [Hypersensitivity] : no hypersensitivity [Dizziness] : no dizziness [Fainting] : no fainting [Lightheadedness] : no lightheadedness [Vertigo] : no vertigo [Cluster Headache] : no cluster headache [Migraine Headache] : no migraine headache [Tension Headache] : no tension-type headache [Inability to Walk] : able to walk [Ataxia] : no ataxia [Frequent Falls] : not falling

## 2024-04-30 NOTE — PHYSICAL EXAM
[FreeTextEntry1] : GEN: NAD, pleasant, cooperative  NEURO:    MENTAL STATUS: AAOx3   LANG/SPEECH: Fluent, intact naming, repetition & comprehension   CRANIAL NERVES:   II: Pupils equal and reactive, no RAPD, Intact visual field   III, IV, VI: EOM intact, no gaze preference or deviation   V: intact V1-2-3 distribution.    VII: no facial asymmetry   VIII: intact hearing to speech   MOTOR: RUE and RLE 5/5, LUE 4/5, LLE 4_/5.   REFLEXES: Brisk b/l UE and LE    SENSORY: intact to touch in all extremities   COORD: L FTN ataxic. with dysmetria.    Gait: Unsteady, uses cane however does not have it today, drags L leg.

## 2024-04-30 NOTE — HISTORY OF PRESENT ILLNESS
[FreeTextEntry1] : 59F w/ PMH of epilepsy, AVM (1986), benign brain tumor s/p resection (2005), (seizure semiology is L UE and LE shaking with no loss in consciousness lasting about 5 minutes), anemia, MELITA, bipolar disorder who presents for routine follow up for her seizure disorder. During previous visit we increased the Oxcarbazepine to 600mg BID and Continue Keppra 1500mg BID, Depakote 500 mg BID, levocarnitine 330mg BID.  Last episode was about 3 weeks ago while at physical therapy, lasted 3 minutes only, with L sided convulsion w/o LOC, improved then at home had another similar episode when she used the nasal spray which was aborted the episode immediately.   In general she reports less seizure episodes. She uses Nayzilam PRN for seizures, used only twice for the past 30 - 45 days.   She denies any side effect to AEDs.  rEEG 10/23:  No electrographic seizures or significant clinical events. Moderate R hemispheric slowing.   Serum AED level on 11/23:  VA 75, Keppra 21.6, Oxcarb 16.  Reviewed: notes from internal medicine Labs above  Previous visit:  She reports that the oxcarbazepine extended release was not covered by insurance, so she continued her original oxcarbazepine dose 300 mg TID. She was admitted on October 23, 2023 for vEEG and was discharged on October 25th, 2023. At that time her oxcarbazepine was changed to 450 mg BID. She reports that the day after discharge she had 2 seizures at home with L UE and L LE shaking, no loss of consciousness that occurred 7 minutes apart, the first one lasted 4 minutes and the second one lasted 3 min. She also had levels checked for her seizure medications and all medications were therapeutic.  Social: no cigarette smoking no alcohol use Allergies: NKDA

## 2024-04-30 NOTE — DATA REVIEWED
[de-identified] :  VEEG from : showed. Background - continuous, asymmetrical with higher amplitude and breach artifact from the right hemisphere, reaching frequencies in the range of 8-9 Hz superimposed by lower range theta Focal and generalized slowin. borderline to mild generalized slowing 2. right hemispheric focal slowing, manly central and temporal region, temporal and posterior quadrants Interictal activity - small number of right temporo-parietal sharp transients and rare sharp waves that appear epileptiform Events - none Seizures - none

## 2024-04-30 NOTE — ASSESSMENT
[FreeTextEntry1] : 59F w/ PMH of epilepsy, R hemispheric AVM (1986), benign brain tumor s/p resection (2005), (seizure semiology is L UE and LE shaking with no loss in consciousness lasting about 5 minutes), anemia, MELITA, bipolar disorder who presents for routine follow up for her seizure disorder. Since previous visit, she remained stable, had an episode 3 wks ago responded to Nayzilam. In general frequency of seizures has been stable.   Recommendations:  Continue with Oxcarbazepine 600mg BID C/w Keppra 1500mg BID C/w Depakote 500mg BID. C/w Levocarnitine 330 BID.  Nayzilam 5mg PRN.  CBC, CMP, Ammonia, Serum VA level prior to next visit.  Pt received education regarding Hydration, proper exercises and lifestyle modifications including healthy diet.  RTC 3 months.

## 2024-05-01 DIAGNOSIS — G40.909 EPILEPSY, UNSPECIFIED, NOT INTRACTABLE, WITHOUT STATUS EPILEPTICUS: ICD-10-CM

## 2024-05-07 ENCOUNTER — TRANSCRIPTION ENCOUNTER (OUTPATIENT)
Age: 59
End: 2024-05-07

## 2024-05-07 RX ORDER — METFORMIN HYDROCHLORIDE 500 MG/1
500 TABLET, COATED ORAL
Qty: 30 | Refills: 5 | Status: ACTIVE | COMMUNITY
Start: 2023-09-11 | End: 1900-01-01

## 2024-05-31 ENCOUNTER — TRANSCRIPTION ENCOUNTER (OUTPATIENT)
Age: 59
End: 2024-05-31

## 2024-05-31 RX ORDER — ATORVASTATIN CALCIUM 10 MG/1
10 TABLET, FILM COATED ORAL
Qty: 90 | Refills: 1 | Status: ACTIVE | COMMUNITY
Start: 2022-12-12 | End: 1900-01-01

## 2024-06-03 ENCOUNTER — TRANSCRIPTION ENCOUNTER (OUTPATIENT)
Age: 59
End: 2024-06-03

## 2024-06-03 RX ORDER — ALBUTEROL SULFATE 90 UG/1
108 (90 BASE) AEROSOL, METERED RESPIRATORY (INHALATION)
Qty: 3 | Refills: 1 | Status: COMPLETED | COMMUNITY
Start: 2017-10-18 | End: 2024-06-03

## 2024-06-03 RX ORDER — IPRATROPIUM BROMIDE AND ALBUTEROL SULFATE 2.5; .5 MG/3ML; MG/3ML
0.5-2.5 (3) SOLUTION RESPIRATORY (INHALATION)
Qty: 1 | Refills: 1 | Status: COMPLETED | COMMUNITY
Start: 2019-05-09 | End: 2024-06-03

## 2024-06-11 NOTE — H&P PST ADULT - HISTORY OF PRESENT ILLNESS
55y Female presents today for presurgical testing for retropubic midurethral sling, cystoscopy. Patient reports stress incontinence for "many years" which has negatively affected her ability to complete her ADLs.  Patient denies any CP, palpitations, SOB, cough, or dysuria. No recent URI or UTI.  Stated exercise tolerance is FOS <1           CHERY screen reviewed    Patient denies any recent personal exposure to COVID19. Denies any sick contacts. Patient denies any travel within the past 30 days. Patient was instructed to quarantine until after procedure    Encounter for other preprocedural examination  Stress incontinence of urine  ^N39.3/ 64260/                                                            Rudd 1/5/21    Anesthesia Alert  NO--Difficult Airway  NO--History of neck surgery or radiation  NO--Limited ROM of neck  NO--History of Malignant hyperthermia  NO--No personal or family history of Pseudocholinesterase deficiency.  NO--Prior Anesthesia Complication  NO--Latex Allergy  NO--Loose teeth  NO--History of Rheumatoid Arthritis  NO--CHERY  YES--Other - seizure disorder    Patient states that this is their complete medical history and list of medications  
show

## 2024-06-20 ENCOUNTER — NON-APPOINTMENT (OUTPATIENT)
Age: 59
End: 2024-06-20

## 2024-06-20 RX ORDER — SOFT LENS DISINFECTANT
SOLUTION, NON-ORAL MISCELLANEOUS
Qty: 1 | Refills: 0 | Status: COMPLETED | COMMUNITY
Start: 2020-12-23 | End: 2024-06-20

## 2024-06-21 RX ORDER — ALBUTEROL SULFATE 2.5 MG/3ML
(2.5 MG/3ML) SOLUTION RESPIRATORY (INHALATION)
Qty: 1 | Refills: 6 | Status: ACTIVE | COMMUNITY
Start: 2024-06-21 | End: 1900-01-01

## 2024-06-21 RX ORDER — BLOOD-GLUCOSE METER
KIT MISCELLANEOUS
Qty: 1 | Refills: 0 | Status: ACTIVE | COMMUNITY
Start: 2024-06-20 | End: 1900-01-01

## 2024-07-02 ENCOUNTER — APPOINTMENT (OUTPATIENT)
Dept: NEUROLOGY | Facility: CLINIC | Age: 59
End: 2024-07-02

## 2024-07-06 ENCOUNTER — OUTPATIENT (OUTPATIENT)
Dept: OUTPATIENT SERVICES | Facility: HOSPITAL | Age: 59
LOS: 1 days | End: 2024-07-06

## 2024-07-06 ENCOUNTER — OUTPATIENT (OUTPATIENT)
Dept: OUTPATIENT SERVICES | Facility: HOSPITAL | Age: 59
LOS: 1 days | End: 2024-07-06
Payer: MEDICAID

## 2024-07-06 DIAGNOSIS — G40.909 EPILEPSY, UNSPECIFIED, NOT INTRACTABLE, WITHOUT STATUS EPILEPTICUS: ICD-10-CM

## 2024-07-06 DIAGNOSIS — E11.9 TYPE 2 DIABETES MELLITUS WITHOUT COMPLICATIONS: ICD-10-CM

## 2024-07-06 DIAGNOSIS — Z90.710 ACQUIRED ABSENCE OF BOTH CERVIX AND UTERUS: Chronic | ICD-10-CM

## 2024-07-06 PROCEDURE — 82140 ASSAY OF AMMONIA: CPT

## 2024-07-06 PROCEDURE — 80053 COMPREHEN METABOLIC PANEL: CPT

## 2024-07-06 PROCEDURE — 80061 LIPID PANEL: CPT

## 2024-07-06 PROCEDURE — 84443 ASSAY THYROID STIM HORMONE: CPT

## 2024-07-06 PROCEDURE — 85027 COMPLETE CBC AUTOMATED: CPT

## 2024-07-06 PROCEDURE — 83036 HEMOGLOBIN GLYCOSYLATED A1C: CPT

## 2024-07-06 PROCEDURE — 80164 ASSAY DIPROPYLACETIC ACD TOT: CPT

## 2024-07-07 DIAGNOSIS — E11.9 TYPE 2 DIABETES MELLITUS WITHOUT COMPLICATIONS: ICD-10-CM

## 2024-07-07 DIAGNOSIS — G40.909 EPILEPSY, UNSPECIFIED, NOT INTRACTABLE, WITHOUT STATUS EPILEPTICUS: ICD-10-CM

## 2024-07-10 ENCOUNTER — TRANSCRIPTION ENCOUNTER (OUTPATIENT)
Age: 59
End: 2024-07-10

## 2024-07-11 ENCOUNTER — TRANSCRIPTION ENCOUNTER (OUTPATIENT)
Age: 59
End: 2024-07-11

## 2024-07-22 ENCOUNTER — APPOINTMENT (OUTPATIENT)
Dept: INTERNAL MEDICINE | Facility: CLINIC | Age: 59
End: 2024-07-22

## 2024-07-22 VITALS
HEIGHT: 61 IN | HEART RATE: 81 BPM | OXYGEN SATURATION: 98 % | SYSTOLIC BLOOD PRESSURE: 107 MMHG | TEMPERATURE: 98.1 F | WEIGHT: 141 LBS | BODY MASS INDEX: 26.62 KG/M2 | DIASTOLIC BLOOD PRESSURE: 69 MMHG

## 2024-07-22 DIAGNOSIS — R73.03 PREDIABETES.: ICD-10-CM

## 2024-07-22 DIAGNOSIS — E78.5 HYPERLIPIDEMIA, UNSPECIFIED: ICD-10-CM

## 2024-07-22 DIAGNOSIS — G40.909 EPILEPSY, UNSPECIFIED, NOT INTRACTABLE, W/OUT STATUS EPILEPTICUS: ICD-10-CM

## 2024-07-22 PROCEDURE — 99214 OFFICE O/P EST MOD 30 MIN: CPT

## 2024-07-22 NOTE — HISTORY OF PRESENT ILLNESS
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 No new complains, inquiring about lab work. Continues to have occasional dizziness-denies any recently falls. SOB with exertion, uses albuterol prn. Has seizures rarely, maybe 1 since last seen. no chest pain.

## 2024-07-22 NOTE — PHYSICAL EXAM
[No Acute Distress] : no acute distress [Well Nourished] : well nourished [Normal Sclera/Conjunctiva] : normal sclera/conjunctiva [EOMI] : extraocular movements intact [Normal Oropharynx] : the oropharynx was normal [No JVD] : no jugular venous distention [Thyroid Normal, No Nodules] : the thyroid was normal and there were no nodules present [No Respiratory Distress] : no respiratory distress  [No Accessory Muscle Use] : no accessory muscle use [Clear to Auscultation] : lungs were clear to auscultation bilaterally [Normal Rate] : normal rate  [Regular Rhythm] : with a regular rhythm [Normal S1, S2] : normal S1 and S2 [Soft] : abdomen soft [Non Tender] : non-tender [No Focal Deficits] : no focal deficits [Normal Affect] : the affect was normal [Normal Insight/Judgement] : insight and judgment were intact

## 2024-07-22 NOTE — PLAN
[FreeTextEntry1] : #Epilepsy - still takes Keppra, Depakote and oxcarbazine  - Takes hydroxizine for anxiety and seizure prevention - follows with neuro, last saw in april   #Asthma - continue with inhalers prn  #Prediabetes #HLD - A1C 6.1 (2023) --> 5.8 (3/2024) -Lipid panel: Triglyceride 132, cholesterol 208, HDL 59, , non  - Diet and lifestyle modifications advised - Taking metformin 500mg qd and atorvastatin 10mg qd   # HCM - Mammogram 4/2024 BIRADS 2: benign-type calcifications seen in the left breast. - pap smear due, needs to schedule appt with gyn  - Colonoscopy: Normal results in 2018 per the patient, prefers FOBT. Card given today - TSH 3/2024 nml  - follow up in Jan with repeat lab work and prn.

## 2024-07-22 NOTE — HISTORY OF PRESENT ILLNESS
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 No new complains, inquiring about lab work. Continues to have occasional dizziness-denies any recently falls. SOB with exertion, uses albuterol prn. Has seizures rarely, maybe 1 since last seen. no chest pain.

## 2024-08-14 ENCOUNTER — TRANSCRIPTION ENCOUNTER (OUTPATIENT)
Age: 59
End: 2024-08-14

## 2024-09-27 ENCOUNTER — TRANSCRIPTION ENCOUNTER (OUTPATIENT)
Age: 59
End: 2024-09-27

## 2024-10-01 ENCOUNTER — APPOINTMENT (OUTPATIENT)
Dept: NEUROLOGY | Facility: CLINIC | Age: 59
End: 2024-10-01
Payer: MEDICAID

## 2024-10-01 ENCOUNTER — OUTPATIENT (OUTPATIENT)
Dept: OUTPATIENT SERVICES | Facility: HOSPITAL | Age: 59
LOS: 1 days | End: 2024-10-01
Payer: MEDICAID

## 2024-10-01 VITALS — DIASTOLIC BLOOD PRESSURE: 75 MMHG | OXYGEN SATURATION: 98 % | HEART RATE: 92 BPM | SYSTOLIC BLOOD PRESSURE: 117 MMHG

## 2024-10-01 DIAGNOSIS — Z90.710 ACQUIRED ABSENCE OF BOTH CERVIX AND UTERUS: Chronic | ICD-10-CM

## 2024-10-01 DIAGNOSIS — G40.909 EPILEPSY, UNSPECIFIED, NOT INTRACTABLE, W/OUT STATUS EPILEPTICUS: ICD-10-CM

## 2024-10-01 DIAGNOSIS — Z00.00 ENCOUNTER FOR GENERAL ADULT MEDICAL EXAMINATION WITHOUT ABNORMAL FINDINGS: ICD-10-CM

## 2024-10-01 PROCEDURE — 99214 OFFICE O/P EST MOD 30 MIN: CPT

## 2024-10-01 PROCEDURE — G2211 COMPLEX E/M VISIT ADD ON: CPT | Mod: NC

## 2024-10-01 NOTE — ASSESSMENT
[FreeTextEntry1] : 59F w/ PMH of epilepsy, R hemispheric AVM (1986), benign brain tumor s/p resection (2005), (seizure semiology is L UE and LE shaking with no loss in consciousness lasting <5 minutes), anemia, MELITA, bipolar disorder who presents for routine follow up for her seizure disorder. Since previous visit, she remained stable, had an episode 1 week ago responded to Nayzilam, another episode that self-aborted. In general frequency of seizures has been stable.  She did have sensation of palpitations during one event and is interested in ruling out cardiac issues.  Recommendations: Sent referral to cardiology for 30d event monitor C/w Oxcarbazepine 600mg BID C/w Keppra 1500mg BID C/w Depakote 500mg BID. C/w Levocarnitine 330 BID. Nayzilam 5mg PRN. CBC, CMP, Ammonia, Serum VA level prior to next visit. Pt received education regarding proper exercises, encouraged to use cane to minimize risk of falls. RTC 6 months.

## 2024-10-01 NOTE — END OF VISIT
[] : Resident [FreeTextEntry3] : Has had 2 break through seizures in last 3 months Used nayzalim once  Plan as above [Time Spent: ___ minutes] : I have spent [unfilled] minutes of time on the encounter which excludes teaching and separately reported services.

## 2024-10-01 NOTE — HISTORY OF PRESENT ILLNESS
[FreeTextEntry1] : 59F w/ PMH of epilepsy, AVM (1986), benign brain tumor s/p resection (2005), (seizure semiology is L UE and LE shaking with no loss in consciousness lasting about 5 minutes), anemia, MELITA, bipolar disorder who presents for routine follow up for her seizure disorder.  Her previous visit was on April 30, 2024, and since then she has been largely stable on her current AEDs.  She had a seizure 2 days ago consisting of feeling dizzy before her LLE began to shake, no LOC, also felt as if her heart was pounding and racing which was atypical for her.  She did not have her nasal spray with her, but it terminated spontaneously in 3 minutes.  Previous to that she had a similar episode while in therapy, aborted in a few seconds with use of Nayzilam spray.  Needs refills of AEDs.  Valproic acid level on April 30 was 73.  From Previous Visit April 30, 2024: 59F w/ PMH of epilepsy, AVM (1986), benign brain tumor s/p resection (2005), (seizure semiology is L UE and LE shaking with no loss in consciousness lasting about 5 minutes), anemia, MELITA, bipolar disorder who presents for routine follow up for her seizure disorder. During previous visit we increased the Oxcarbazepine to 600mg BID and Continue Keppra 1500mg BID, Depakote 500 mg BID, levocarnitine 330mg BID. Last episode was about 3 weeks ago while at physical therapy, lasted 3 minutes only, with L sided convulsion w/o LOC, improved then at home had another similar episode when she used the nasal spray which was aborted the episode immediately. In general she reports less seizure episodes. She uses Nayzilam PRN for seizures, used only twice for the past 30 - 45 days. She denies any side effect to AEDs. rEEG 10/23: No electrographic seizures or significant clinical events. Moderate R hemispheric slowing. Serum AED level on 11/23: VA 75, Keppra 21.6, Oxcarb 16.

## 2024-10-02 DIAGNOSIS — G40.909 EPILEPSY, UNSPECIFIED, NOT INTRACTABLE, WITHOUT STATUS EPILEPTICUS: ICD-10-CM

## 2024-11-12 ENCOUNTER — TRANSCRIPTION ENCOUNTER (OUTPATIENT)
Age: 59
End: 2024-11-12

## 2024-11-18 ENCOUNTER — TRANSCRIPTION ENCOUNTER (OUTPATIENT)
Age: 59
End: 2024-11-18

## 2024-11-22 ENCOUNTER — TRANSCRIPTION ENCOUNTER (OUTPATIENT)
Age: 59
End: 2024-11-22

## 2024-12-03 ENCOUNTER — TRANSCRIPTION ENCOUNTER (OUTPATIENT)
Age: 59
End: 2024-12-03

## 2025-01-08 ENCOUNTER — NON-APPOINTMENT (OUTPATIENT)
Age: 60
End: 2025-01-08

## 2025-01-08 ENCOUNTER — OUTPATIENT (OUTPATIENT)
Dept: OUTPATIENT SERVICES | Facility: HOSPITAL | Age: 60
LOS: 1 days | End: 2025-01-08
Payer: MEDICAID

## 2025-01-08 DIAGNOSIS — Z90.710 ACQUIRED ABSENCE OF BOTH CERVIX AND UTERUS: Chronic | ICD-10-CM

## 2025-01-08 PROCEDURE — 83735 ASSAY OF MAGNESIUM: CPT

## 2025-01-08 PROCEDURE — 80053 COMPREHEN METABOLIC PANEL: CPT

## 2025-01-08 PROCEDURE — 83036 HEMOGLOBIN GLYCOSYLATED A1C: CPT

## 2025-01-08 PROCEDURE — 80061 LIPID PANEL: CPT

## 2025-01-08 PROCEDURE — 36415 COLL VENOUS BLD VENIPUNCTURE: CPT

## 2025-01-08 PROCEDURE — 85027 COMPLETE CBC AUTOMATED: CPT

## 2025-01-27 ENCOUNTER — APPOINTMENT (OUTPATIENT)
Dept: INTERNAL MEDICINE | Facility: CLINIC | Age: 60
End: 2025-01-27
Payer: MEDICAID

## 2025-01-27 ENCOUNTER — OUTPATIENT (OUTPATIENT)
Dept: OUTPATIENT SERVICES | Facility: HOSPITAL | Age: 60
LOS: 1 days | End: 2025-01-27
Payer: MEDICAID

## 2025-01-27 VITALS
BODY MASS INDEX: 26.43 KG/M2 | HEART RATE: 102 BPM | SYSTOLIC BLOOD PRESSURE: 134 MMHG | DIASTOLIC BLOOD PRESSURE: 79 MMHG | HEIGHT: 61 IN | OXYGEN SATURATION: 98 % | TEMPERATURE: 97.2 F | WEIGHT: 140 LBS

## 2025-01-27 DIAGNOSIS — J45.909 UNSPECIFIED ASTHMA, UNCOMPLICATED: ICD-10-CM

## 2025-01-27 DIAGNOSIS — Z00.00 ENCOUNTER FOR GENERAL ADULT MEDICAL EXAMINATION WITHOUT ABNORMAL FINDINGS: ICD-10-CM

## 2025-01-27 DIAGNOSIS — Z90.710 ACQUIRED ABSENCE OF BOTH CERVIX AND UTERUS: Chronic | ICD-10-CM

## 2025-01-27 DIAGNOSIS — E78.5 HYPERLIPIDEMIA, UNSPECIFIED: ICD-10-CM

## 2025-01-27 DIAGNOSIS — R73.03 PREDIABETES.: ICD-10-CM

## 2025-01-27 DIAGNOSIS — G40.909 EPILEPSY, UNSPECIFIED, NOT INTRACTABLE, W/OUT STATUS EPILEPTICUS: ICD-10-CM

## 2025-01-27 PROCEDURE — G2211 COMPLEX E/M VISIT ADD ON: CPT | Mod: NC

## 2025-01-27 PROCEDURE — 99214 OFFICE O/P EST MOD 30 MIN: CPT

## 2025-01-28 DIAGNOSIS — G40.909 EPILEPSY, UNSPECIFIED, NOT INTRACTABLE, WITHOUT STATUS EPILEPTICUS: ICD-10-CM

## 2025-01-28 DIAGNOSIS — E78.5 HYPERLIPIDEMIA, UNSPECIFIED: ICD-10-CM

## 2025-01-28 DIAGNOSIS — Z00.00 ENCOUNTER FOR GENERAL ADULT MEDICAL EXAMINATION WITHOUT ABNORMAL FINDINGS: ICD-10-CM

## 2025-01-28 DIAGNOSIS — R73.03 PREDIABETES: ICD-10-CM

## 2025-01-28 DIAGNOSIS — J45.909 UNSPECIFIED ASTHMA, UNCOMPLICATED: ICD-10-CM

## 2025-01-30 ENCOUNTER — NON-APPOINTMENT (OUTPATIENT)
Age: 60
End: 2025-01-30

## 2025-01-31 ENCOUNTER — NON-APPOINTMENT (OUTPATIENT)
Age: 60
End: 2025-01-31

## 2025-02-07 ENCOUNTER — NON-APPOINTMENT (OUTPATIENT)
Age: 60
End: 2025-02-07

## 2025-02-18 ENCOUNTER — NON-APPOINTMENT (OUTPATIENT)
Age: 60
End: 2025-02-18

## 2025-02-21 ENCOUNTER — TRANSCRIPTION ENCOUNTER (OUTPATIENT)
Age: 60
End: 2025-02-21

## 2025-02-25 ENCOUNTER — TRANSCRIPTION ENCOUNTER (OUTPATIENT)
Age: 60
End: 2025-02-25

## 2025-03-24 ENCOUNTER — TRANSCRIPTION ENCOUNTER (OUTPATIENT)
Age: 60
End: 2025-03-24

## 2025-03-25 ENCOUNTER — TRANSCRIPTION ENCOUNTER (OUTPATIENT)
Age: 60
End: 2025-03-25

## 2025-03-27 ENCOUNTER — TRANSCRIPTION ENCOUNTER (OUTPATIENT)
Age: 60
End: 2025-03-27

## 2025-04-01 ENCOUNTER — OUTPATIENT (OUTPATIENT)
Dept: OUTPATIENT SERVICES | Facility: HOSPITAL | Age: 60
LOS: 1 days | End: 2025-04-01
Payer: MEDICAID

## 2025-04-01 ENCOUNTER — APPOINTMENT (OUTPATIENT)
Dept: CARDIOLOGY | Facility: CLINIC | Age: 60
End: 2025-04-01
Payer: MEDICAID

## 2025-04-01 ENCOUNTER — NON-APPOINTMENT (OUTPATIENT)
Age: 60
End: 2025-04-01

## 2025-04-01 VITALS
SYSTOLIC BLOOD PRESSURE: 112 MMHG | OXYGEN SATURATION: 97 % | DIASTOLIC BLOOD PRESSURE: 63 MMHG | HEART RATE: 96 BPM | WEIGHT: 148 LBS | BODY MASS INDEX: 27.94 KG/M2 | TEMPERATURE: 97.4 F | HEIGHT: 61 IN

## 2025-04-01 DIAGNOSIS — G40.909 EPILEPSY, UNSPECIFIED, NOT INTRACTABLE, W/OUT STATUS EPILEPTICUS: ICD-10-CM

## 2025-04-01 DIAGNOSIS — J45.909 UNSPECIFIED ASTHMA, UNCOMPLICATED: ICD-10-CM

## 2025-04-01 DIAGNOSIS — Z00.00 ENCOUNTER FOR GENERAL ADULT MEDICAL EXAMINATION WITHOUT ABNORMAL FINDINGS: ICD-10-CM

## 2025-04-01 DIAGNOSIS — R00.2 PALPITATIONS: ICD-10-CM

## 2025-04-01 DIAGNOSIS — E11.9 TYPE 2 DIABETES MELLITUS W/OUT COMPLICATIONS: ICD-10-CM

## 2025-04-01 DIAGNOSIS — Z90.710 ACQUIRED ABSENCE OF BOTH CERVIX AND UTERUS: Chronic | ICD-10-CM

## 2025-04-01 PROCEDURE — 99204 OFFICE O/P NEW MOD 45 MIN: CPT

## 2025-04-03 ENCOUNTER — NON-APPOINTMENT (OUTPATIENT)
Age: 60
End: 2025-04-03

## 2025-04-05 DIAGNOSIS — R00.2 PALPITATIONS: ICD-10-CM

## 2025-04-05 DIAGNOSIS — J45.909 UNSPECIFIED ASTHMA, UNCOMPLICATED: ICD-10-CM

## 2025-04-05 DIAGNOSIS — E11.9 TYPE 2 DIABETES MELLITUS WITHOUT COMPLICATIONS: ICD-10-CM

## 2025-04-05 DIAGNOSIS — G40.909 EPILEPSY, UNSPECIFIED, NOT INTRACTABLE, WITHOUT STATUS EPILEPTICUS: ICD-10-CM

## 2025-04-08 ENCOUNTER — RESULT REVIEW (OUTPATIENT)
Age: 60
End: 2025-04-08

## 2025-04-08 ENCOUNTER — APPOINTMENT (OUTPATIENT)
Age: 60
End: 2025-04-08
Payer: MEDICAID

## 2025-04-08 ENCOUNTER — OUTPATIENT (OUTPATIENT)
Dept: OUTPATIENT SERVICES | Facility: HOSPITAL | Age: 60
LOS: 1 days | End: 2025-04-08
Payer: MEDICAID

## 2025-04-08 DIAGNOSIS — Z90.710 ACQUIRED ABSENCE OF BOTH CERVIX AND UTERUS: Chronic | ICD-10-CM

## 2025-04-08 DIAGNOSIS — R00.2 PALPITATIONS: ICD-10-CM

## 2025-04-08 PROCEDURE — 93306 TTE W/DOPPLER COMPLETE: CPT | Mod: 26

## 2025-04-08 PROCEDURE — 93306 TTE W/DOPPLER COMPLETE: CPT

## 2025-04-09 ENCOUNTER — TRANSCRIPTION ENCOUNTER (OUTPATIENT)
Age: 60
End: 2025-04-09

## 2025-04-09 DIAGNOSIS — R00.2 PALPITATIONS: ICD-10-CM

## 2025-04-29 ENCOUNTER — NON-APPOINTMENT (OUTPATIENT)
Age: 60
End: 2025-04-29

## 2025-04-29 PROCEDURE — 93248 EXT ECG>7D<15D REV&INTERPJ: CPT

## 2025-04-30 ENCOUNTER — TRANSCRIPTION ENCOUNTER (OUTPATIENT)
Age: 60
End: 2025-04-30

## 2025-04-30 ENCOUNTER — NON-APPOINTMENT (OUTPATIENT)
Age: 60
End: 2025-04-30

## 2025-05-05 ENCOUNTER — TRANSCRIPTION ENCOUNTER (OUTPATIENT)
Age: 60
End: 2025-05-05

## 2025-05-06 ENCOUNTER — TRANSCRIPTION ENCOUNTER (OUTPATIENT)
Age: 60
End: 2025-05-06

## 2025-06-02 ENCOUNTER — TRANSCRIPTION ENCOUNTER (OUTPATIENT)
Age: 60
End: 2025-06-02

## 2025-06-03 ENCOUNTER — APPOINTMENT (OUTPATIENT)
Dept: NEUROLOGY | Facility: CLINIC | Age: 60
End: 2025-06-03
Payer: MEDICAID

## 2025-06-03 ENCOUNTER — OUTPATIENT (OUTPATIENT)
Dept: OUTPATIENT SERVICES | Facility: HOSPITAL | Age: 60
LOS: 1 days | End: 2025-06-03
Payer: MEDICAID

## 2025-06-03 VITALS
HEART RATE: 101 BPM | WEIGHT: 147.19 LBS | DIASTOLIC BLOOD PRESSURE: 61 MMHG | BODY MASS INDEX: 27.81 KG/M2 | OXYGEN SATURATION: 95 % | SYSTOLIC BLOOD PRESSURE: 106 MMHG

## 2025-06-03 DIAGNOSIS — Z90.710 ACQUIRED ABSENCE OF BOTH CERVIX AND UTERUS: Chronic | ICD-10-CM

## 2025-06-03 DIAGNOSIS — G40.909 EPILEPSY, UNSPECIFIED, NOT INTRACTABLE, W/OUT STATUS EPILEPTICUS: ICD-10-CM

## 2025-06-03 DIAGNOSIS — Z00.00 ENCOUNTER FOR GENERAL ADULT MEDICAL EXAMINATION WITHOUT ABNORMAL FINDINGS: ICD-10-CM

## 2025-06-03 PROCEDURE — G2211 COMPLEX E/M VISIT ADD ON: CPT | Mod: NC

## 2025-06-03 PROCEDURE — 99214 OFFICE O/P EST MOD 30 MIN: CPT

## 2025-06-04 DIAGNOSIS — G40.909 EPILEPSY, UNSPECIFIED, NOT INTRACTABLE, WITHOUT STATUS EPILEPTICUS: ICD-10-CM

## 2025-06-06 ENCOUNTER — TRANSCRIPTION ENCOUNTER (OUTPATIENT)
Age: 60
End: 2025-06-06

## 2025-07-21 ENCOUNTER — OUTPATIENT (OUTPATIENT)
Dept: OUTPATIENT SERVICES | Facility: HOSPITAL | Age: 60
LOS: 1 days | End: 2025-07-21
Payer: MEDICAID

## 2025-07-21 DIAGNOSIS — Z00.00 ENCOUNTER FOR GENERAL ADULT MEDICAL EXAMINATION WITHOUT ABNORMAL FINDINGS: ICD-10-CM

## 2025-07-21 DIAGNOSIS — Z90.710 ACQUIRED ABSENCE OF BOTH CERVIX AND UTERUS: Chronic | ICD-10-CM

## 2025-07-21 PROCEDURE — 36415 COLL VENOUS BLD VENIPUNCTURE: CPT

## 2025-07-21 PROCEDURE — 85025 COMPLETE CBC W/AUTO DIFF WBC: CPT

## 2025-07-21 PROCEDURE — 80053 COMPREHEN METABOLIC PANEL: CPT

## 2025-07-21 PROCEDURE — 83036 HEMOGLOBIN GLYCOSYLATED A1C: CPT

## 2025-07-21 PROCEDURE — 80061 LIPID PANEL: CPT

## 2025-07-21 PROCEDURE — 84443 ASSAY THYROID STIM HORMONE: CPT

## 2025-07-22 DIAGNOSIS — Z00.00 ENCOUNTER FOR GENERAL ADULT MEDICAL EXAMINATION WITHOUT ABNORMAL FINDINGS: ICD-10-CM

## 2025-07-28 ENCOUNTER — RESULT REVIEW (OUTPATIENT)
Age: 60
End: 2025-07-28

## 2025-07-28 ENCOUNTER — OUTPATIENT (OUTPATIENT)
Dept: OUTPATIENT SERVICES | Facility: HOSPITAL | Age: 60
LOS: 1 days | End: 2025-07-28
Payer: MEDICAID

## 2025-07-28 ENCOUNTER — APPOINTMENT (OUTPATIENT)
Dept: INTERNAL MEDICINE | Facility: CLINIC | Age: 60
End: 2025-07-28
Payer: MEDICAID

## 2025-07-28 VITALS
BODY MASS INDEX: 27.56 KG/M2 | TEMPERATURE: 97.7 F | SYSTOLIC BLOOD PRESSURE: 122 MMHG | OXYGEN SATURATION: 96 % | HEIGHT: 61 IN | DIASTOLIC BLOOD PRESSURE: 77 MMHG | HEART RATE: 92 BPM | WEIGHT: 146 LBS

## 2025-07-28 DIAGNOSIS — J45.909 UNSPECIFIED ASTHMA, UNCOMPLICATED: ICD-10-CM

## 2025-07-28 DIAGNOSIS — E78.5 HYPERLIPIDEMIA, UNSPECIFIED: ICD-10-CM

## 2025-07-28 DIAGNOSIS — G40.909 EPILEPSY, UNSPECIFIED, NOT INTRACTABLE, WITHOUT STATUS EPILEPTICUS: ICD-10-CM

## 2025-07-28 DIAGNOSIS — R73.03 PREDIABETES: ICD-10-CM

## 2025-07-28 DIAGNOSIS — Z00.00 ENCOUNTER FOR GENERAL ADULT MEDICAL EXAMINATION WITHOUT ABNORMAL FINDINGS: ICD-10-CM

## 2025-07-28 DIAGNOSIS — M54.50 LOW BACK PAIN, UNSPECIFIED: ICD-10-CM

## 2025-07-28 DIAGNOSIS — Z90.710 ACQUIRED ABSENCE OF BOTH CERVIX AND UTERUS: Chronic | ICD-10-CM

## 2025-07-28 DIAGNOSIS — R73.03 PREDIABETES.: ICD-10-CM

## 2025-07-28 DIAGNOSIS — M54.59 OTHER LOW BACK PAIN: ICD-10-CM

## 2025-07-28 DIAGNOSIS — G40.909 EPILEPSY, UNSPECIFIED, NOT INTRACTABLE, W/OUT STATUS EPILEPTICUS: ICD-10-CM

## 2025-07-28 PROCEDURE — 72100 X-RAY EXAM L-S SPINE 2/3 VWS: CPT

## 2025-07-28 PROCEDURE — G2211 COMPLEX E/M VISIT ADD ON: CPT | Mod: NC

## 2025-07-28 PROCEDURE — 72100 X-RAY EXAM L-S SPINE 2/3 VWS: CPT | Mod: 26

## 2025-07-28 PROCEDURE — 99214 OFFICE O/P EST MOD 30 MIN: CPT

## 2025-07-28 RX ORDER — ACETAMINOPHEN 500 MG/1
500 TABLET ORAL
Qty: 90 | Refills: 12 | Status: ACTIVE | COMMUNITY
Start: 2025-07-28 | End: 1900-01-01

## 2025-07-28 RX ORDER — METFORMIN HYDROCHLORIDE 500 MG/1
500 TABLET, COATED ORAL TWICE DAILY
Qty: 60 | Refills: 6 | Status: ACTIVE | COMMUNITY
Start: 2025-07-28 | End: 1900-01-01

## 2025-07-29 ENCOUNTER — OUTPATIENT (OUTPATIENT)
Dept: OUTPATIENT SERVICES | Facility: HOSPITAL | Age: 60
LOS: 1 days | End: 2025-07-29
Payer: MEDICAID

## 2025-07-29 ENCOUNTER — APPOINTMENT (OUTPATIENT)
Dept: CARDIOLOGY | Facility: CLINIC | Age: 60
End: 2025-07-29
Payer: MEDICAID

## 2025-07-29 VITALS
DIASTOLIC BLOOD PRESSURE: 79 MMHG | SYSTOLIC BLOOD PRESSURE: 125 MMHG | HEART RATE: 98 BPM | TEMPERATURE: 98 F | WEIGHT: 148 LBS | OXYGEN SATURATION: 97 % | BODY MASS INDEX: 27.94 KG/M2 | HEIGHT: 61 IN

## 2025-07-29 DIAGNOSIS — R00.2 PALPITATIONS: ICD-10-CM

## 2025-07-29 DIAGNOSIS — Z90.710 ACQUIRED ABSENCE OF BOTH CERVIX AND UTERUS: Chronic | ICD-10-CM

## 2025-07-29 DIAGNOSIS — R07.89 OTHER CHEST PAIN: ICD-10-CM

## 2025-07-29 DIAGNOSIS — M54.59 OTHER LOW BACK PAIN: ICD-10-CM

## 2025-07-29 DIAGNOSIS — Z00.00 ENCOUNTER FOR GENERAL ADULT MEDICAL EXAMINATION WITHOUT ABNORMAL FINDINGS: ICD-10-CM

## 2025-07-29 PROCEDURE — 99214 OFFICE O/P EST MOD 30 MIN: CPT

## 2025-07-30 ENCOUNTER — NON-APPOINTMENT (OUTPATIENT)
Age: 60
End: 2025-07-30

## 2025-08-04 ENCOUNTER — NON-APPOINTMENT (OUTPATIENT)
Age: 60
End: 2025-08-04

## 2025-08-04 DIAGNOSIS — R07.89 OTHER CHEST PAIN: ICD-10-CM

## 2025-08-04 DIAGNOSIS — R00.2 PALPITATIONS: ICD-10-CM

## 2025-08-05 ENCOUNTER — NON-APPOINTMENT (OUTPATIENT)
Age: 60
End: 2025-08-05

## 2025-08-25 ENCOUNTER — TRANSCRIPTION ENCOUNTER (OUTPATIENT)
Age: 60
End: 2025-08-25

## 2025-09-08 ENCOUNTER — TRANSCRIPTION ENCOUNTER (OUTPATIENT)
Age: 60
End: 2025-09-08

## 2025-09-20 ENCOUNTER — RESULT REVIEW (OUTPATIENT)
Age: 60
End: 2025-09-20